# Patient Record
Sex: FEMALE | Race: BLACK OR AFRICAN AMERICAN | Employment: FULL TIME | URBAN - METROPOLITAN AREA
[De-identification: names, ages, dates, MRNs, and addresses within clinical notes are randomized per-mention and may not be internally consistent; named-entity substitution may affect disease eponyms.]

---

## 2017-01-06 ENCOUNTER — OFFICE VISIT (OUTPATIENT)
Dept: INTERNAL MEDICINE CLINIC | Age: 57
End: 2017-01-06

## 2017-01-06 ENCOUNTER — TELEPHONE (OUTPATIENT)
Dept: INTERNAL MEDICINE CLINIC | Age: 57
End: 2017-01-06

## 2017-01-06 VITALS
DIASTOLIC BLOOD PRESSURE: 78 MMHG | BODY MASS INDEX: 43.05 KG/M2 | HEART RATE: 91 BPM | WEIGHT: 243 LBS | HEIGHT: 63 IN | OXYGEN SATURATION: 95 % | SYSTOLIC BLOOD PRESSURE: 140 MMHG

## 2017-01-06 DIAGNOSIS — R07.1 CHEST PAIN ON BREATHING: Primary | ICD-10-CM

## 2017-01-06 DIAGNOSIS — R07.89 OTHER CHEST PAIN: Primary | ICD-10-CM

## 2017-01-06 PROCEDURE — 99213 OFFICE O/P EST LOW 20 MIN: CPT | Performed by: FAMILY MEDICINE

## 2017-01-06 PROCEDURE — 93000 ELECTROCARDIOGRAM COMPLETE: CPT | Performed by: FAMILY MEDICINE

## 2017-01-06 ASSESSMENT — ENCOUNTER SYMPTOMS: COUGH: 1

## 2017-01-12 ENCOUNTER — HOSPITAL ENCOUNTER (OUTPATIENT)
Dept: NON INVASIVE DIAGNOSTICS | Age: 57
Discharge: HOME | End: 2017-01-12
Attending: FAMILY MEDICINE | Admitting: FAMILY MEDICINE

## 2017-01-12 DIAGNOSIS — R07.89 OTHER CHEST PAIN: ICD-10-CM

## 2017-01-26 ENCOUNTER — TELEPHONE (OUTPATIENT)
Dept: INTERNAL MEDICINE CLINIC | Age: 57
End: 2017-01-26

## 2017-02-28 ENCOUNTER — TELEPHONE (OUTPATIENT)
Dept: INTERNAL MEDICINE CLINIC | Age: 57
End: 2017-02-28

## 2017-02-28 DIAGNOSIS — M54.50 CHRONIC LOW BACK PAIN WITHOUT SCIATICA, UNSPECIFIED BACK PAIN LATERALITY: Primary | ICD-10-CM

## 2017-02-28 DIAGNOSIS — G89.29 CHRONIC LOW BACK PAIN WITHOUT SCIATICA, UNSPECIFIED BACK PAIN LATERALITY: Primary | ICD-10-CM

## 2017-04-24 ENCOUNTER — TELEPHONE (OUTPATIENT)
Dept: INTERNAL MEDICINE CLINIC | Age: 57
End: 2017-04-24

## 2017-04-27 ENCOUNTER — OFFICE VISIT (OUTPATIENT)
Dept: ENDOCRINOLOGY | Age: 57
End: 2017-04-27

## 2017-04-27 VITALS
DIASTOLIC BLOOD PRESSURE: 78 MMHG | HEIGHT: 63 IN | WEIGHT: 244 LBS | HEART RATE: 72 BPM | OXYGEN SATURATION: 97 % | BODY MASS INDEX: 43.23 KG/M2 | RESPIRATION RATE: 14 BRPM | SYSTOLIC BLOOD PRESSURE: 135 MMHG

## 2017-04-27 DIAGNOSIS — E05.90 HYPERTHYROIDISM: ICD-10-CM

## 2017-04-27 DIAGNOSIS — E05.00 GRAVES' DISEASE: Primary | ICD-10-CM

## 2017-04-27 PROCEDURE — 99213 OFFICE O/P EST LOW 20 MIN: CPT | Performed by: INTERNAL MEDICINE

## 2017-04-28 DIAGNOSIS — E05.90 HYPERTHYROIDISM: ICD-10-CM

## 2017-04-28 DIAGNOSIS — E05.00 GRAVES' DISEASE: ICD-10-CM

## 2017-04-28 LAB
T4 FREE: 1.4 NG/DL (ref 0.9–1.8)
TSH SERPL DL<=0.05 MIU/L-ACNC: 1.81 UIU/ML (ref 0.27–4.2)

## 2017-04-30 LAB — TSH RECEPTOR AB: 3.03 IU/L

## 2017-05-08 ENCOUNTER — TELEPHONE (OUTPATIENT)
Dept: INTERNAL MEDICINE CLINIC | Age: 57
End: 2017-05-08

## 2017-05-08 DIAGNOSIS — E66.01 MORBID OBESITY DUE TO EXCESS CALORIES (HCC): ICD-10-CM

## 2017-05-10 ENCOUNTER — TELEPHONE (OUTPATIENT)
Dept: INTERNAL MEDICINE CLINIC | Age: 57
End: 2017-05-10

## 2017-05-12 LAB
CHOLESTEROL, TOTAL: 201 MG/DL (ref 0–199)
GLUCOSE BLD-MCNC: 87 MG/DL (ref 70–99)
HDLC SERPL-MCNC: 80 MG/DL (ref 40–60)
LDL CHOLESTEROL CALCULATED: 107 MG/DL
TRIGL SERPL-MCNC: 69 MG/DL (ref 0–150)

## 2017-05-16 ENCOUNTER — TELEPHONE (OUTPATIENT)
Dept: INTERNAL MEDICINE CLINIC | Age: 57
End: 2017-05-16

## 2017-05-18 ENCOUNTER — TELEPHONE (OUTPATIENT)
Dept: INTERNAL MEDICINE CLINIC | Age: 57
End: 2017-05-18

## 2017-05-23 ENCOUNTER — TELEPHONE (OUTPATIENT)
Dept: INTERNAL MEDICINE CLINIC | Age: 57
End: 2017-05-23

## 2017-05-23 DIAGNOSIS — E66.01 MORBID OBESITY DUE TO EXCESS CALORIES (HCC): ICD-10-CM

## 2017-06-12 ENCOUNTER — TELEPHONE (OUTPATIENT)
Dept: INTERNAL MEDICINE CLINIC | Age: 57
End: 2017-06-12

## 2017-06-13 ENCOUNTER — TELEPHONE (OUTPATIENT)
Dept: INTERNAL MEDICINE CLINIC | Age: 57
End: 2017-06-13

## 2017-06-20 ENCOUNTER — OFFICE VISIT (OUTPATIENT)
Dept: INTERNAL MEDICINE CLINIC | Age: 57
End: 2017-06-20

## 2017-06-20 VITALS
DIASTOLIC BLOOD PRESSURE: 70 MMHG | WEIGHT: 240 LBS | OXYGEN SATURATION: 96 % | TEMPERATURE: 97.8 F | HEART RATE: 72 BPM | SYSTOLIC BLOOD PRESSURE: 140 MMHG | RESPIRATION RATE: 16 BRPM | HEIGHT: 63 IN | BODY MASS INDEX: 42.52 KG/M2

## 2017-06-20 DIAGNOSIS — M51.36 DDD (DEGENERATIVE DISC DISEASE), LUMBAR: Primary | ICD-10-CM

## 2017-06-20 DIAGNOSIS — M54.5 ACUTE LOW BACK PAIN, UNSPECIFIED BACK PAIN LATERALITY, WITH SCIATICA PRESENCE UNSPECIFIED: ICD-10-CM

## 2017-06-20 DIAGNOSIS — J41.0 SIMPLE CHRONIC BRONCHITIS (HCC): ICD-10-CM

## 2017-06-20 DIAGNOSIS — I10 ESSENTIAL HYPERTENSION: ICD-10-CM

## 2017-06-20 LAB
CREATININE URINE POCT: ABNORMAL
MICROALBUMIN/CREAT 24H UR: ABNORMAL MG/G{CREAT}
MICROALBUMIN/CREAT UR-RTO: ABNORMAL

## 2017-06-20 PROCEDURE — 99214 OFFICE O/P EST MOD 30 MIN: CPT | Performed by: FAMILY MEDICINE

## 2017-06-20 PROCEDURE — 82044 UR ALBUMIN SEMIQUANTITATIVE: CPT | Performed by: FAMILY MEDICINE

## 2017-06-20 RX ORDER — TRAMADOL HYDROCHLORIDE 50 MG/1
50 TABLET ORAL EVERY 6 HOURS PRN
Qty: 120 TABLET | Refills: 2 | Status: SHIPPED | OUTPATIENT
Start: 2017-06-20 | End: 2017-07-20

## 2017-06-20 RX ORDER — LISINOPRIL AND HYDROCHLOROTHIAZIDE 12.5; 1 MG/1; MG/1
1 TABLET ORAL DAILY
Qty: 30 TABLET | Refills: 2 | Status: SHIPPED | OUTPATIENT
Start: 2017-06-20 | End: 2018-06-07

## 2017-06-20 RX ORDER — IBUPROFEN 800 MG/1
800 TABLET ORAL EVERY 8 HOURS PRN
Qty: 90 TABLET | Refills: 5 | Status: SHIPPED | OUTPATIENT
Start: 2017-06-20 | End: 2018-05-29 | Stop reason: SDUPTHER

## 2017-06-20 RX ORDER — IBUPROFEN 800 MG/1
800 TABLET ORAL EVERY 8 HOURS PRN
Qty: 90 TABLET | Refills: 5 | OUTPATIENT
Start: 2017-06-20

## 2017-06-20 RX ORDER — FLUTICASONE FUROATE AND VILANTEROL 100; 25 UG/1; UG/1
1 POWDER RESPIRATORY (INHALATION) DAILY
Qty: 1 EACH | Refills: 5 | Status: SHIPPED | OUTPATIENT
Start: 2017-06-20 | End: 2018-01-02 | Stop reason: SDUPTHER

## 2017-07-06 RX ORDER — MONTELUKAST SODIUM 10 MG/1
TABLET ORAL
Qty: 90 TABLET | Refills: 1 | Status: SHIPPED | OUTPATIENT
Start: 2017-07-06 | End: 2018-01-02 | Stop reason: SDUPTHER

## 2017-07-10 ENCOUNTER — TELEPHONE (OUTPATIENT)
Dept: INTERNAL MEDICINE CLINIC | Age: 57
End: 2017-07-10

## 2017-07-25 ENCOUNTER — TELEPHONE (OUTPATIENT)
Dept: INTERNAL MEDICINE CLINIC | Age: 57
End: 2017-07-25

## 2017-08-08 ENCOUNTER — TELEPHONE (OUTPATIENT)
Dept: INTERNAL MEDICINE CLINIC | Age: 57
End: 2017-08-08

## 2017-08-08 DIAGNOSIS — J41.0 SIMPLE CHRONIC BRONCHITIS (HCC): Primary | ICD-10-CM

## 2017-08-09 ENCOUNTER — TELEPHONE (OUTPATIENT)
Dept: PHARMACY | Facility: CLINIC | Age: 57
End: 2017-08-09

## 2017-08-14 DIAGNOSIS — E05.90 HYPERTHYROIDISM: Primary | ICD-10-CM

## 2017-08-22 DIAGNOSIS — E66.01 MORBID OBESITY, UNSPECIFIED OBESITY TYPE (HCC): ICD-10-CM

## 2017-08-31 ENCOUNTER — HOSPITAL ENCOUNTER (OUTPATIENT)
Dept: MAMMOGRAPHY | Age: 57
Discharge: HOME | End: 2017-08-31
Attending: FAMILY MEDICINE | Admitting: FAMILY MEDICINE

## 2017-08-31 DIAGNOSIS — Z12.31 VISIT FOR SCREENING MAMMOGRAM: ICD-10-CM

## 2017-09-07 ENCOUNTER — TELEPHONE (OUTPATIENT)
Dept: INTERNAL MEDICINE CLINIC | Age: 57
End: 2017-09-07

## 2017-09-15 ENCOUNTER — TELEPHONE (OUTPATIENT)
Dept: INTERNAL MEDICINE CLINIC | Age: 57
End: 2017-09-15

## 2017-09-18 DIAGNOSIS — E66.01 MORBID OBESITY, UNSPECIFIED OBESITY TYPE (HCC): ICD-10-CM

## 2017-10-03 ENCOUNTER — TELEPHONE (OUTPATIENT)
Dept: INTERNAL MEDICINE CLINIC | Age: 57
End: 2017-10-03

## 2017-10-13 ENCOUNTER — TELEPHONE (OUTPATIENT)
Dept: INTERNAL MEDICINE CLINIC | Age: 57
End: 2017-10-13

## 2017-10-31 ENCOUNTER — TELEPHONE (OUTPATIENT)
Dept: INTERNAL MEDICINE CLINIC | Age: 57
End: 2017-10-31

## 2017-11-09 ENCOUNTER — TELEPHONE (OUTPATIENT)
Dept: ENDOCRINOLOGY | Age: 57
End: 2017-11-09

## 2017-11-09 ENCOUNTER — TELEPHONE (OUTPATIENT)
Dept: INTERNAL MEDICINE CLINIC | Age: 57
End: 2017-11-09

## 2017-11-15 DIAGNOSIS — E05.90 HYPERTHYROIDISM: ICD-10-CM

## 2017-11-15 LAB
T4 FREE: 1.4 NG/DL (ref 0.9–1.8)
TSH SERPL DL<=0.05 MIU/L-ACNC: 0.13 UIU/ML (ref 0.27–4.2)

## 2017-12-05 ENCOUNTER — TELEPHONE (OUTPATIENT)
Dept: INTERNAL MEDICINE CLINIC | Age: 57
End: 2017-12-05

## 2017-12-06 ENCOUNTER — TELEPHONE (OUTPATIENT)
Dept: INTERNAL MEDICINE CLINIC | Age: 57
End: 2017-12-06

## 2017-12-18 ENCOUNTER — TELEPHONE (OUTPATIENT)
Dept: INTERNAL MEDICINE CLINIC | Age: 57
End: 2017-12-18

## 2017-12-28 ENCOUNTER — OFFICE VISIT (OUTPATIENT)
Dept: ENDOCRINOLOGY | Age: 57
End: 2017-12-28

## 2017-12-28 VITALS
BODY MASS INDEX: 41.57 KG/M2 | HEART RATE: 79 BPM | OXYGEN SATURATION: 98 % | RESPIRATION RATE: 16 BRPM | HEIGHT: 63 IN | DIASTOLIC BLOOD PRESSURE: 94 MMHG | SYSTOLIC BLOOD PRESSURE: 143 MMHG | WEIGHT: 234.6 LBS

## 2017-12-28 DIAGNOSIS — E05.90 HYPERTHYROIDISM: Primary | ICD-10-CM

## 2017-12-28 DIAGNOSIS — E05.00 GRAVES' DISEASE: ICD-10-CM

## 2017-12-28 PROCEDURE — 99213 OFFICE O/P EST LOW 20 MIN: CPT | Performed by: INTERNAL MEDICINE

## 2017-12-28 NOTE — PROGRESS NOTES
ophthalmology. Has mild exophthalmos. Cleared for MCKENZIE as per ophthalmology. Labs in 11/17 showed TSH 0.13 and Free T4 of 1.4    No treatment is recommended at this point given mild hyperthyroidism and no symptoms. Monitor labs every 6 months. 2. Obesity. Tried contrave. On saxenda as per PCP. 3. High B.P . Did not stat taking lisinopril given by PCP. Works at [employer]. Advised to have it checked and discuss with PCP if it stays high.

## 2018-01-02 ENCOUNTER — TELEPHONE (OUTPATIENT)
Dept: INTERNAL MEDICINE CLINIC | Age: 58
End: 2018-01-02

## 2018-01-02 DIAGNOSIS — J41.0 SIMPLE CHRONIC BRONCHITIS (HCC): ICD-10-CM

## 2018-01-02 DIAGNOSIS — E66.01 MORBID OBESITY, UNSPECIFIED OBESITY TYPE (HCC): ICD-10-CM

## 2018-01-02 RX ORDER — MONTELUKAST SODIUM 10 MG/1
TABLET ORAL
Qty: 90 TABLET | Refills: 3 | Status: SHIPPED | OUTPATIENT
Start: 2018-01-02 | End: 2018-04-04 | Stop reason: SDUPTHER

## 2018-01-02 RX ORDER — FLUTICASONE FUROATE AND VILANTEROL 100; 25 UG/1; UG/1
1 POWDER RESPIRATORY (INHALATION) DAILY
Qty: 1 EACH | Refills: 5 | Status: SHIPPED | OUTPATIENT
Start: 2018-01-02 | End: 2018-07-31 | Stop reason: SDUPTHER

## 2018-01-03 ENCOUNTER — TELEPHONE (OUTPATIENT)
Dept: INTERNAL MEDICINE CLINIC | Age: 58
End: 2018-01-03

## 2018-01-03 DIAGNOSIS — E05.00 GRAVES' DISEASE: ICD-10-CM

## 2018-01-03 DIAGNOSIS — H10.89 OTHER CONJUNCTIVITIS OF LEFT EYE: Primary | ICD-10-CM

## 2018-01-03 RX ORDER — SULFACETAMIDE SODIUM 100 MG/ML
2 SOLUTION/ DROPS OPHTHALMIC
Qty: 1 BOTTLE | Refills: 0 | Status: SHIPPED | OUTPATIENT
Start: 2018-01-03 | End: 2018-01-08

## 2018-01-24 ENCOUNTER — TELEPHONE (OUTPATIENT)
Dept: INTERNAL MEDICINE CLINIC | Age: 58
End: 2018-01-24

## 2018-01-24 DIAGNOSIS — E66.01 MORBID OBESITY, UNSPECIFIED OBESITY TYPE (HCC): ICD-10-CM

## 2018-02-23 DIAGNOSIS — E66.01 MORBID OBESITY, UNSPECIFIED OBESITY TYPE (HCC): ICD-10-CM

## 2018-04-04 ENCOUNTER — EMPLOYEE WELLNESS (OUTPATIENT)
Dept: OTHER | Age: 58
End: 2018-04-04

## 2018-04-04 LAB
CHOLESTEROL, TOTAL: 215 MG/DL (ref 0–199)
GLUCOSE BLD-MCNC: 79 MG/DL (ref 70–99)
HDLC SERPL-MCNC: 79 MG/DL (ref 40–60)
LDL CHOLESTEROL CALCULATED: 117 MG/DL
TRIGL SERPL-MCNC: 94 MG/DL (ref 0–150)

## 2018-04-05 RX ORDER — MONTELUKAST SODIUM 10 MG/1
TABLET ORAL
Qty: 90 TABLET | Refills: 3 | Status: SHIPPED | OUTPATIENT
Start: 2018-04-05 | End: 2018-04-09 | Stop reason: SDUPTHER

## 2018-04-09 RX ORDER — MONTELUKAST SODIUM 10 MG/1
TABLET ORAL
Qty: 90 TABLET | Refills: 3 | Status: CANCELLED | OUTPATIENT
Start: 2018-04-09

## 2018-04-10 VITALS — BODY MASS INDEX: 41.98 KG/M2 | WEIGHT: 237 LBS

## 2018-04-10 RX ORDER — MONTELUKAST SODIUM 10 MG/1
TABLET ORAL
Qty: 90 TABLET | Refills: 3 | Status: SHIPPED | OUTPATIENT
Start: 2018-04-10 | End: 2019-07-01 | Stop reason: SDUPTHER

## 2018-05-10 ENCOUNTER — TELEPHONE (OUTPATIENT)
Dept: INTERNAL MEDICINE CLINIC | Age: 58
End: 2018-05-10

## 2018-05-16 DIAGNOSIS — E66.01 MORBID OBESITY, UNSPECIFIED OBESITY TYPE (HCC): ICD-10-CM

## 2018-05-29 ENCOUNTER — OFFICE VISIT (OUTPATIENT)
Dept: INTERNAL MEDICINE CLINIC | Age: 58
End: 2018-05-29

## 2018-05-29 VITALS
BODY MASS INDEX: 41.81 KG/M2 | SYSTOLIC BLOOD PRESSURE: 126 MMHG | HEART RATE: 73 BPM | DIASTOLIC BLOOD PRESSURE: 84 MMHG | OXYGEN SATURATION: 98 % | WEIGHT: 236 LBS

## 2018-05-29 DIAGNOSIS — R79.89 LOW VITAMIN D LEVEL: ICD-10-CM

## 2018-05-29 DIAGNOSIS — Z00.00 HEALTHCARE MAINTENANCE: ICD-10-CM

## 2018-05-29 DIAGNOSIS — J45.40 MODERATE PERSISTENT ASTHMA WITHOUT COMPLICATION: Primary | ICD-10-CM

## 2018-05-29 DIAGNOSIS — M51.36 DDD (DEGENERATIVE DISC DISEASE), LUMBAR: ICD-10-CM

## 2018-05-29 DIAGNOSIS — E05.90 HYPERTHYROIDISM: ICD-10-CM

## 2018-05-29 LAB
HEPATITIS C ANTIBODY INTERPRETATION: NORMAL
T4 FREE: 1.7 NG/DL (ref 0.9–1.8)
TSH REFLEX: 0.04 UIU/ML (ref 0.27–4.2)
VITAMIN D 25-HYDROXY: 24.8 NG/ML

## 2018-05-29 PROCEDURE — 99214 OFFICE O/P EST MOD 30 MIN: CPT | Performed by: NURSE PRACTITIONER

## 2018-05-29 RX ORDER — LEVOCETIRIZINE DIHYDROCHLORIDE 5 MG/1
5 TABLET, FILM COATED ORAL NIGHTLY
Qty: 30 TABLET | Refills: 3 | Status: SHIPPED | OUTPATIENT
Start: 2018-05-29 | End: 2018-08-20

## 2018-05-29 RX ORDER — IBUPROFEN 800 MG/1
800 TABLET ORAL EVERY 8 HOURS PRN
Qty: 90 TABLET | Refills: 5 | Status: SHIPPED | OUTPATIENT
Start: 2018-05-29 | End: 2020-05-07 | Stop reason: SDUPTHER

## 2018-05-29 RX ORDER — LEVOCETIRIZINE DIHYDROCHLORIDE 5 MG/1
5 TABLET, FILM COATED ORAL NIGHTLY
Qty: 30 TABLET | Refills: 0 | Status: SHIPPED | OUTPATIENT
Start: 2018-05-29 | End: 2018-08-20

## 2018-05-29 ASSESSMENT — PATIENT HEALTH QUESTIONNAIRE - PHQ9
1. LITTLE INTEREST OR PLEASURE IN DOING THINGS: 0
SUM OF ALL RESPONSES TO PHQ QUESTIONS 1-9: 0
2. FEELING DOWN, DEPRESSED OR HOPELESS: 0
SUM OF ALL RESPONSES TO PHQ9 QUESTIONS 1 & 2: 0

## 2018-05-29 ASSESSMENT — ENCOUNTER SYMPTOMS
SHORTNESS OF BREATH: 1
COUGH: 1

## 2018-05-30 LAB — T3 TOTAL: 1.39 NG/ML (ref 0.8–2)

## 2018-06-07 ENCOUNTER — OFFICE VISIT (OUTPATIENT)
Dept: ENDOCRINOLOGY | Age: 58
End: 2018-06-07

## 2018-06-07 VITALS
BODY MASS INDEX: 42.35 KG/M2 | SYSTOLIC BLOOD PRESSURE: 157 MMHG | HEIGHT: 63 IN | WEIGHT: 239 LBS | OXYGEN SATURATION: 95 % | RESPIRATION RATE: 14 BRPM | HEART RATE: 74 BPM | DIASTOLIC BLOOD PRESSURE: 90 MMHG

## 2018-06-07 DIAGNOSIS — E05.90 HYPERTHYROIDISM: ICD-10-CM

## 2018-06-07 DIAGNOSIS — E05.00 GRAVES' DISEASE: Primary | ICD-10-CM

## 2018-06-07 PROCEDURE — 99214 OFFICE O/P EST MOD 30 MIN: CPT | Performed by: INTERNAL MEDICINE

## 2018-06-07 RX ORDER — METHIMAZOLE 5 MG/1
TABLET ORAL
Qty: 45 TABLET | Refills: 3 | Status: SHIPPED | OUTPATIENT
Start: 2018-06-07 | End: 2019-06-27 | Stop reason: SDUPTHER

## 2018-06-13 ENCOUNTER — TELEPHONE (OUTPATIENT)
Dept: INTERNAL MEDICINE CLINIC | Age: 58
End: 2018-06-13

## 2018-07-03 ENCOUNTER — TELEPHONE (OUTPATIENT)
Dept: RHEUMATOLOGY | Age: 58
End: 2018-07-03

## 2018-07-03 NOTE — TELEPHONE ENCOUNTER
PA submitted for Levocetirizine Dihydrochloride 5MG tablets on [facility]  DX: Allergic Rhinitis J30.1  (Key: [id number])       Pending Review

## 2018-07-20 ENCOUNTER — TELEPHONE (OUTPATIENT)
Dept: INTERNAL MEDICINE CLINIC | Age: 58
End: 2018-07-20

## 2018-07-31 ENCOUNTER — OFFICE VISIT (OUTPATIENT)
Dept: INTERNAL MEDICINE CLINIC | Age: 58
End: 2018-07-31

## 2018-07-31 VITALS
OXYGEN SATURATION: 98 % | SYSTOLIC BLOOD PRESSURE: 136 MMHG | WEIGHT: 235 LBS | BODY MASS INDEX: 41.63 KG/M2 | HEART RATE: 81 BPM | DIASTOLIC BLOOD PRESSURE: 70 MMHG

## 2018-07-31 DIAGNOSIS — E66.01 MORBID OBESITY, UNSPECIFIED OBESITY TYPE (HCC): ICD-10-CM

## 2018-07-31 DIAGNOSIS — J45.40 MODERATE PERSISTENT ASTHMA WITHOUT COMPLICATION: Primary | ICD-10-CM

## 2018-07-31 DIAGNOSIS — J30.89 ENVIRONMENTAL AND SEASONAL ALLERGIES: ICD-10-CM

## 2018-07-31 DIAGNOSIS — J41.0 SIMPLE CHRONIC BRONCHITIS (HCC): ICD-10-CM

## 2018-07-31 PROCEDURE — 99213 OFFICE O/P EST LOW 20 MIN: CPT | Performed by: NURSE PRACTITIONER

## 2018-07-31 RX ORDER — FLUTICASONE FUROATE AND VILANTEROL 100; 25 UG/1; UG/1
1 POWDER RESPIRATORY (INHALATION) DAILY
Qty: 1 EACH | Refills: 5 | Status: SHIPPED | OUTPATIENT
Start: 2018-07-31 | End: 2019-04-22 | Stop reason: SDUPTHER

## 2018-07-31 RX ORDER — ALBUTEROL SULFATE 90 UG/1
2 AEROSOL, METERED RESPIRATORY (INHALATION) EVERY 6 HOURS PRN
Qty: 1 INHALER | Refills: 3 | Status: SHIPPED | OUTPATIENT
Start: 2018-07-31 | End: 2018-07-31 | Stop reason: SDUPTHER

## 2018-07-31 RX ORDER — ALBUTEROL SULFATE 90 UG/1
2 AEROSOL, METERED RESPIRATORY (INHALATION) EVERY 6 HOURS PRN
Qty: 1 INHALER | Refills: 0 | Status: SHIPPED | OUTPATIENT
Start: 2018-07-31 | End: 2019-07-09 | Stop reason: SDUPTHER

## 2018-07-31 ASSESSMENT — ENCOUNTER SYMPTOMS
SHORTNESS OF BREATH: 1
COUGH: 1
CHEST TIGHTNESS: 1

## 2018-07-31 NOTE — PROGRESS NOTES
Subjective:      Patient ID: Oskar Shah is a 56 y.o. female. Presents today with chest tightness, SOB and congestion for one week. States she has not been taking her BREO daily and has not been using her rescue inhaler. She felt the rescue inhaler she was also needed when it with intense need        Review of Systems   HENT: Positive for postnasal drip. Respiratory: Positive for cough, chest tightness and shortness of breath. All other systems reviewed and are negative. Wt Readings from Last 3 Encounters:   07/31/18 235 lb (106.6 kg)   06/07/18 239 lb (108.4 kg)   05/29/18 236 lb (107 kg)     BP Readings from Last 3 Encounters:   07/31/18 (!) 140/80   06/07/18 (!) 157/90   05/29/18 126/84     Objective:   Physical Exam   Constitutional: She is oriented to person, place, and time. She appears well-developed and well-nourished. Neck: Normal range of motion and full passive range of motion without pain. Neck supple. Cardiovascular: Normal rate, regular rhythm and normal heart sounds. Pulmonary/Chest: Effort normal. She has decreased breath sounds in the right lower field and the left lower field. Peak flow 400-425 cc's   Neurological: She is alert and oriented to person, place, and time. Skin: Skin is warm and dry. Psychiatric: Her mood appears anxious.    Under emotional stress due to father had a recent stroke and she has been traveling back and forth to check on him in the hospital       Assessment:      Asthma: Peak flow between 275 and 400      Plan:      Use Albuterol inhaler with SOB or coughing   Take Breo every day, place by toothbrush at night    Allergies    Gargle with warm salt and water each night  Increase water intake to 6-8 glasses a day

## 2018-08-20 ENCOUNTER — OFFICE VISIT (OUTPATIENT)
Dept: INTERNAL MEDICINE CLINIC | Age: 58
End: 2018-08-20

## 2018-08-20 VITALS
SYSTOLIC BLOOD PRESSURE: 124 MMHG | DIASTOLIC BLOOD PRESSURE: 70 MMHG | BODY MASS INDEX: 39.95 KG/M2 | HEIGHT: 64 IN | WEIGHT: 234 LBS

## 2018-08-20 DIAGNOSIS — E66.01 MORBID OBESITY, UNSPECIFIED OBESITY TYPE (HCC): ICD-10-CM

## 2018-08-20 DIAGNOSIS — Z00.00 WELL ADULT EXAM: Primary | ICD-10-CM

## 2018-08-20 DIAGNOSIS — J45.20 MILD INTERMITTENT EXTRINSIC ASTHMA WITHOUT COMPLICATION: ICD-10-CM

## 2018-08-20 PROCEDURE — 99396 PREV VISIT EST AGE 40-64: CPT | Performed by: NURSE PRACTITIONER

## 2018-08-20 RX ORDER — FEXOFENADINE HCL 180 MG/1
180 TABLET, FILM COATED ORAL DAILY
Qty: 30 TABLET | Refills: 1 | Status: SHIPPED | OUTPATIENT
Start: 2018-08-20 | End: 2019-07-01 | Stop reason: SDUPTHER

## 2018-08-20 ASSESSMENT — ENCOUNTER SYMPTOMS
SHORTNESS OF BREATH: 1
COUGH: 1

## 2018-09-27 ENCOUNTER — TELEPHONE (OUTPATIENT)
Dept: INTERNAL MEDICINE CLINIC | Age: 58
End: 2018-09-27

## 2018-09-27 DIAGNOSIS — E66.01 MORBID OBESITY, UNSPECIFIED OBESITY TYPE (HCC): ICD-10-CM

## 2018-10-12 ENCOUNTER — TELEPHONE (OUTPATIENT)
Dept: INTERNAL MEDICINE CLINIC | Age: 58
End: 2018-10-12

## 2018-10-23 DIAGNOSIS — E05.00 GRAVES' DISEASE: ICD-10-CM

## 2018-10-23 DIAGNOSIS — E05.90 HYPERTHYROIDISM: ICD-10-CM

## 2018-10-23 LAB
T4 FREE: 1.7 NG/DL (ref 0.9–1.8)
TSH SERPL DL<=0.05 MIU/L-ACNC: <0.01 UIU/ML (ref 0.27–4.2)

## 2018-10-25 LAB — TSH RECEPTOR AB: 2.36 IU/L

## 2018-11-12 ENCOUNTER — OFFICE VISIT (OUTPATIENT)
Dept: INTERNAL MEDICINE CLINIC | Age: 58
End: 2018-11-12
Payer: COMMERCIAL

## 2018-11-12 VITALS — BODY MASS INDEX: 38.58 KG/M2 | WEIGHT: 223 LBS | DIASTOLIC BLOOD PRESSURE: 60 MMHG | SYSTOLIC BLOOD PRESSURE: 130 MMHG

## 2018-11-12 DIAGNOSIS — R19.7 DIARRHEA, UNSPECIFIED TYPE: Primary | ICD-10-CM

## 2018-11-12 DIAGNOSIS — E66.01 MORBID OBESITY, UNSPECIFIED OBESITY TYPE (HCC): ICD-10-CM

## 2018-11-12 PROCEDURE — 99213 OFFICE O/P EST LOW 20 MIN: CPT | Performed by: NURSE PRACTITIONER

## 2018-11-12 RX ORDER — CIPROFLOXACIN 250 MG/1
250 TABLET, FILM COATED ORAL DAILY
Qty: 3 TABLET | Refills: 0 | Status: SHIPPED | OUTPATIENT
Start: 2018-11-12 | End: 2018-11-15

## 2018-11-12 RX ORDER — SULFAMETHOXAZOLE AND TRIMETHOPRIM 800; 160 MG/1; MG/1
1 TABLET ORAL 2 TIMES DAILY
Qty: 10 TABLET | Refills: 0 | Status: SHIPPED | OUTPATIENT
Start: 2018-11-12 | End: 2018-11-17

## 2018-11-12 RX ORDER — CIPROFLOXACIN 500 MG/1
500 TABLET, FILM COATED ORAL 2 TIMES DAILY
Qty: 3 TABLET | Refills: 0 | Status: SHIPPED | OUTPATIENT
Start: 2018-11-12 | End: 2018-11-15

## 2018-11-12 ASSESSMENT — ENCOUNTER SYMPTOMS
DIARRHEA: 1
ABDOMINAL PAIN: 0

## 2019-02-06 RX ORDER — LEVOCETIRIZINE DIHYDROCHLORIDE 5 MG/1
5 TABLET, FILM COATED ORAL NIGHTLY
Qty: 30 TABLET | Refills: 3 | Status: SHIPPED | OUTPATIENT
Start: 2019-02-06 | End: 2019-07-02 | Stop reason: SDUPTHER

## 2019-04-10 ENCOUNTER — TELEPHONE (OUTPATIENT)
Dept: INTERNAL MEDICINE CLINIC | Age: 59
End: 2019-04-10

## 2019-04-22 ENCOUNTER — OFFICE VISIT (OUTPATIENT)
Dept: INTERNAL MEDICINE CLINIC | Age: 59
End: 2019-04-22
Payer: COMMERCIAL

## 2019-04-22 VITALS — WEIGHT: 227 LBS | DIASTOLIC BLOOD PRESSURE: 80 MMHG | BODY MASS INDEX: 39.27 KG/M2 | SYSTOLIC BLOOD PRESSURE: 148 MMHG

## 2019-04-22 DIAGNOSIS — J41.0 SIMPLE CHRONIC BRONCHITIS (HCC): ICD-10-CM

## 2019-04-22 DIAGNOSIS — J45.31 MILD PERSISTENT ASTHMA WITH ACUTE EXACERBATION: ICD-10-CM

## 2019-04-22 DIAGNOSIS — J45.40 MODERATE PERSISTENT ASTHMA WITHOUT COMPLICATION: Primary | ICD-10-CM

## 2019-04-22 PROCEDURE — 99214 OFFICE O/P EST MOD 30 MIN: CPT | Performed by: NURSE PRACTITIONER

## 2019-04-22 RX ORDER — ALBUTEROL SULFATE 2.5 MG/3ML
2.5 SOLUTION RESPIRATORY (INHALATION) EVERY 6 HOURS PRN
Qty: 120 EACH | Status: CANCELLED | OUTPATIENT
Start: 2019-04-22

## 2019-04-22 RX ORDER — FLUTICASONE FUROATE AND VILANTEROL 100; 25 UG/1; UG/1
1 POWDER RESPIRATORY (INHALATION) DAILY
Qty: 1 EACH | Refills: 5 | Status: SHIPPED | OUTPATIENT
Start: 2019-04-22 | End: 2019-09-19 | Stop reason: ALTCHOICE

## 2019-04-22 RX ORDER — ALBUTEROL SULFATE 2.5 MG/3ML
2.5 SOLUTION RESPIRATORY (INHALATION) EVERY 6 HOURS PRN
Qty: 120 EACH | Refills: 1 | Status: SHIPPED | OUTPATIENT
Start: 2019-04-22 | End: 2019-07-10

## 2019-04-22 ASSESSMENT — PATIENT HEALTH QUESTIONNAIRE - PHQ9
1. LITTLE INTEREST OR PLEASURE IN DOING THINGS: 0
SUM OF ALL RESPONSES TO PHQ9 QUESTIONS 1 & 2: 0
SUM OF ALL RESPONSES TO PHQ QUESTIONS 1-9: 0
SUM OF ALL RESPONSES TO PHQ QUESTIONS 1-9: 0
2. FEELING DOWN, DEPRESSED OR HOPELESS: 0

## 2019-04-22 ASSESSMENT — ENCOUNTER SYMPTOMS
COUGH: 1
CHEST TIGHTNESS: 1

## 2019-04-22 NOTE — PROGRESS NOTES
albuteroSubjective:      Patient ID: Terri Mcneil is a 66 y.o. female. Asthma   She complains of chest tightness and cough. This is a chronic problem. The current episode started more than 1 year ago. The problem has been waxing and waning. The cough is non-productive. Associated symptoms include chest pain and postnasal drip. Her symptoms are aggravated by nothing. Her symptoms are not alleviated by OTC cough suppressant. Her past medical history is significant for asthma. Review of Systems   Constitutional: Negative. HENT: Positive for postnasal drip. Respiratory: Positive for cough. Cardiovascular: Positive for chest pain. Endocrine: Negative. Genitourinary: Negative. Musculoskeletal: Negative. Vitals:    04/22/19 0848   BP: (!) 148/80     BP Readings from Last 3 Encounters:   04/22/19 (!) 148/80   11/12/18 130/60   08/20/18 124/70     Wt Readings from Last 3 Encounters:   04/22/19 227 lb (103 kg)   11/12/18 223 lb (101.2 kg)   08/20/18 234 lb (106.1 kg)     Past Medical History:   Diagnosis Date    Chronic back pain     COPD (chronic obstructive pulmonary disease) (HCC)     confirmed with PFT     HTN, goal below 140/90     Hyperthyroidism     Grave's dz    Red eyes     Restrictive lung disease     secondary to weight    Surgical menopause     LMP at age 39     Objective:   Physical Exam   Constitutional: She appears well-developed and well-nourished. HENT:   Head: Normocephalic. Right Ear: Hearing, tympanic membrane and external ear normal.   Left Ear: Hearing, tympanic membrane and external ear normal.   Nose: Nose normal. Right sinus exhibits no maxillary sinus tenderness and no frontal sinus tenderness. Left sinus exhibits no maxillary sinus tenderness and no frontal sinus tenderness. Mouth/Throat: Uvula is midline and mucous membranes are normal. Oropharyngeal exudate present.        Bilateral canal redness noted   Cardiovascular: Normal rate, regular rhythm and normal heart sounds. Pulmonary/Chest: Effort normal. She has decreased breath sounds in the right lower field and the left lower field. Lymphadenopathy:        Head (right side): No submental, no submandibular, no tonsillar, no preauricular, no posterior auricular and no occipital adenopathy present. Head (left side): No submental, no submandibular, no tonsillar, no preauricular, no posterior auricular and no occipital adenopathy present. She has no cervical adenopathy.        Assessment:      Asthma: 50% of visit spent on counselling      Plan:      Take fexofenadine at bedtime  Gargle with warm salt and water every night  Increase water intake  Do Breo once every day        Yanelis Clement Ip, APRN - CNP

## 2019-06-26 DIAGNOSIS — J45.40 MODERATE PERSISTENT ASTHMA WITHOUT COMPLICATION: ICD-10-CM

## 2019-06-26 DIAGNOSIS — J45.20 MILD INTERMITTENT EXTRINSIC ASTHMA WITHOUT COMPLICATION: ICD-10-CM

## 2019-06-26 RX ORDER — FEXOFENADINE HCL 180 MG/1
180 TABLET, FILM COATED ORAL DAILY
Qty: 30 TABLET | Refills: 1 | Status: CANCELLED | OUTPATIENT
Start: 2019-06-26

## 2019-06-26 RX ORDER — MONTELUKAST SODIUM 10 MG/1
TABLET ORAL
Qty: 90 TABLET | Refills: 3 | Status: CANCELLED | OUTPATIENT
Start: 2019-06-26

## 2019-06-27 DIAGNOSIS — J45.20 MILD INTERMITTENT EXTRINSIC ASTHMA WITHOUT COMPLICATION: ICD-10-CM

## 2019-06-27 DIAGNOSIS — E05.90 HYPERTHYROIDISM: Primary | ICD-10-CM

## 2019-06-27 RX ORDER — METHIMAZOLE 5 MG/1
TABLET ORAL
Qty: 45 TABLET | Refills: 3 | Status: SHIPPED | OUTPATIENT
Start: 2019-06-27 | End: 2020-03-19 | Stop reason: SDUPTHER

## 2019-07-01 RX ORDER — MONTELUKAST SODIUM 10 MG/1
TABLET ORAL
Qty: 90 TABLET | Refills: 3 | Status: SHIPPED | OUTPATIENT
Start: 2019-07-01 | End: 2019-07-09 | Stop reason: SDUPTHER

## 2019-07-01 RX ORDER — FEXOFENADINE HCL 180 MG/1
180 TABLET, FILM COATED ORAL DAILY
Qty: 30 TABLET | Refills: 1 | Status: SHIPPED | OUTPATIENT
Start: 2019-07-01 | End: 2019-08-08

## 2019-07-03 DIAGNOSIS — J45.40 MODERATE PERSISTENT ASTHMA WITHOUT COMPLICATION: ICD-10-CM

## 2019-07-09 RX ORDER — MONTELUKAST SODIUM 10 MG/1
TABLET ORAL
Qty: 90 TABLET | Refills: 3 | Status: SHIPPED | OUTPATIENT
Start: 2019-07-09 | End: 2020-12-09 | Stop reason: SDUPTHER

## 2019-07-09 RX ORDER — ALBUTEROL SULFATE 90 UG/1
2 AEROSOL, METERED RESPIRATORY (INHALATION) EVERY 6 HOURS PRN
Qty: 1 INHALER | Refills: 0 | Status: SHIPPED | OUTPATIENT
Start: 2019-07-09 | End: 2019-07-10

## 2019-07-09 RX ORDER — LEVOCETIRIZINE DIHYDROCHLORIDE 5 MG/1
5 TABLET, FILM COATED ORAL NIGHTLY
Qty: 30 TABLET | Refills: 3 | Status: SHIPPED | OUTPATIENT
Start: 2019-07-09 | End: 2019-07-10

## 2019-07-10 ENCOUNTER — OFFICE VISIT (OUTPATIENT)
Dept: ENT CLINIC | Age: 59
End: 2019-07-10
Payer: COMMERCIAL

## 2019-07-10 VITALS
HEART RATE: 77 BPM | BODY MASS INDEX: 38.98 KG/M2 | HEIGHT: 63 IN | DIASTOLIC BLOOD PRESSURE: 90 MMHG | SYSTOLIC BLOOD PRESSURE: 150 MMHG | WEIGHT: 220 LBS

## 2019-07-10 DIAGNOSIS — E05.00 GRAVES' DISEASE: ICD-10-CM

## 2019-07-10 DIAGNOSIS — G47.30 SLEEP APNEA IN ADULT: ICD-10-CM

## 2019-07-10 DIAGNOSIS — R09.82 PND (POST-NASAL DRIP): ICD-10-CM

## 2019-07-10 DIAGNOSIS — R09.89 PHLEGM IN THROAT: ICD-10-CM

## 2019-07-10 DIAGNOSIS — J30.1 SEASONAL ALLERGIC RHINITIS DUE TO POLLEN: Primary | ICD-10-CM

## 2019-07-10 PROCEDURE — 99203 OFFICE O/P NEW LOW 30 MIN: CPT | Performed by: OTOLARYNGOLOGY

## 2019-07-10 RX ORDER — FLUTICASONE PROPIONATE 50 MCG
SPRAY, SUSPENSION (ML) NASAL
Qty: 1 BOTTLE | Refills: 2 | Status: SHIPPED | OUTPATIENT
Start: 2019-07-10 | End: 2020-09-09 | Stop reason: SDUPTHER

## 2019-07-10 NOTE — PROGRESS NOTES
right arthroscopy           EXAMINATION:    VITALS SIGNS reviewed. Vitals:    07/10/19 1424   BP: (!) 150/90   Pulse: 77   Weight: 220 lb (99.8 kg)   Height: 5' 3\" (1.6 m)     GENERAL APPEARANCE: WDWN NAD, A&Ox3. ABILITY TO COMMUNICATE/QUALITY OF VOICE:  Normal, no hoarseness or hot potato quality.  (+) EYES:  Graves ophthalmopathy appearance, mild, left > right. Extraocular motion: intact, normal primary gaze alignment. SALIVARY GLANDS:  Parotid gland and submandibular gland normal bilaterally. INSPECTION, HEAD AND FACE:  Normal with no masses, lesions, tenderness, or deformities. FACIAL STRENGTH, MOTION:  Facial nerve function intact and equal bilaterally for all 5 branches. SINUSES:  Frontal sinuses and maxillary sinuses nontender, bilaterally. HEARING ASSESSMENT:  Hearing was intact to finger rub at the external meatus bilaterally. Tuning fork tests, using a 512 Hz tuning fork, showed the Hager test midline and Rinne test air conduction greater and bone conduction bilaterally. EXTERNAL EAR/NOSE:  The pinnae, mastoids, and external nose were normal bilaterally. EARS, OTOSCOPY:  The tympanic membranes and external auditory canals were normal bilaterally. NOSE:  The nasal septum was midline. The inferior turbinates were normal bilaterally. Nasal mucosa and nasal secretions were normal bilaterally. LIPS, TEETH AND GUMS:  Normal.  (+) OROPHARYNX/ORAL CAVITY:  Uvula elongated with redundant OP mucosa Tonsils normal.  Normal mucosa with no ulcerations, masses, lesions, erythema, exudate, or structural abnormalities. NECK:  Normal with no masses, tenderness, or tracheal deviation, and with normal laryngeal crepitus.  (+) THYROID:  Bilateral goiter. Normal, with no nodules, goiter, or tenderness bilaterally. LYMPH NODES, CERVICAL, FACIAL AND SUPRACLAVICULAR:  No lymphadenopathy detected.         REVIEW OF IMAGING:       Narrative   INDICATION: Hyperthyroidism.       COMPARISON: None.     FINDINGS:       Sonography of the thyroid gland was performed. The entire thyroid   gland is enlarged and heterogeneous in echo texture. The right   lobe measures 5.6 x 2.1 x 3.1 cm. The left lobe measures 5.8 x 2.2   x 2.8 cm. There is no discrete right thyroid nodules identified.       Within the left thyroid lobe, scattered cysts are noted with no   discrete nodule.           Impression   IMPRESSION:        Diffusely enlarged and heterogeneous thyroid gland. Scattered   cysts are identified with no discrete nodules.             Narrative   NUCLEAR MEDICINE, THYROID UPTAKE SCAN.    JUL 02, 2014 08:31:52   AM:       INDICATION:  \"Hyperthyroidism. \"                                               COMPARISON: Ultrasound thyroid, 6/25/2014.       FINDINGS:       24 hours following the administration of the Iodine-123 capsule,   the uptake is calculated as 69.9%.       The uptake pattern is symmetrical. The thyroid gland is globally   enlarged, consistent with the ultrasound.           Impression   IMPRESSION:       24-hour thyroid uptake of 69.9% (normal range 10 to 35%). This is   consistent with Graves disease.       Thyromegaly, also consistent with Graves disease. IMPRESSION / DIAGNOSES / ORDERS:       Stephan Balderrama was seen today for allergic rhinitis  and thyroid problem. Diagnoses and all orders for this visit:    Seasonal allergic rhinitis due to pollen  -     fluticasone (FLONASE) 50 MCG/ACT nasal spray; 2 sprays in each nostril daily. PND (post-nasal drip)  -     fluticasone (FLONASE) 50 MCG/ACT nasal spray; 2 sprays in each nostril daily. Phlegm in throat    Sleep apnea in adult  -     [facility] - Adriana Mosley MD, Sleep Medicine, [facility]' disease        RECOMMENDATIONS/PLAN:    See patient instructions on file for this visit, which were fully discussed with the patient.       Follow-up  Return if symptoms worsen or fail to improve with treatment, or, for any further Ear, Nose,

## 2019-07-10 NOTE — PATIENT INSTRUCTIONS
ulcers inside your nose;  · injury of or surgery on your nose;  · tuberculosis or any other infection or illness;  · glaucoma or cataracts;  · liver disease;  · osteoporosis; or  · herpes simplex virus of your eyes. If you use fluticasone nasal without a prescription and you have any medical conditions, ask a doctor or pharmacist if this medicine is safe for you. Also tell your doctor if you have diabetes. Steroid medicines may increase the glucose (sugar) levels in your blood or urine. You may also need to adjust the dose of your diabetes medications. It is not known whether fluticasone nasal will harm an unborn baby. Ask a doctor before using this medicine if you are pregnant. It is not known whether fluticasone nasal passes into breast milk or if it could affect a nursing baby. Ask a doctor before using this medicine if you are breast-feeding. How should I use fluticasone nasal?  Use exactly as directed on the label, or as prescribed by your doctor. Do not use in larger or smaller amounts or for longer than recommended. Do not share this medicine with another person, even if they have the same symptoms you have. The usual dose of fluticasone nasal is 1 to 2 sprays into each nostril once or twice per day. Your dose will depend on the fluticasone brand or strength you use, and your dose may change once your symptoms improve. Follow all dosing instructions very carefully. Thania Almonte is not approved for use by anyone younger than 25years old. Do not use Flonase in a child younger than 3years old. Do not use Veramyst in a child younger than 3years old. Any child using fluticasone nasal should be supervised by an adult while using the nasal spray. This medicine comes with patient instructions for safe and effective use, and directions for priming the nasal spray device. Follow these directions carefully. Ask your doctor or pharmacist if you have any questions. Shake the nasal spray just before each use.   If you switched to fluticasone from another steroid medicine, do not stop using the other steroid suddenly or you may have unpleasant withdrawal symptoms. Talk with your doctor about tapering your steroid dose before stopping completely. To be sure fluticasone nasal is not causing harmful effects on your nose or sinuses, your doctor may need to check your progress on a regular basis. It may take up to several days before your symptoms improve. Keep using the medication as directed and tell your doctor if your symptoms do not improve after a week of treatment. Store fluticasone nasal in an upright position at room temperature, away from moisture and heat. Throw the spray bottle away after you have used 120 sprays, even if there is still medicine left in the bottle. What happens if I miss a dose? Use the missed dose as soon as you remember. Skip the missed dose if it is almost time for your next scheduled dose. Do not use extra medicine to make up the missed dose. What happens if I overdose? Seek emergency medical attention or call the Poison Help line at 1-362.645.2425. An overdose of fluticasone nasal is not expected to produce life threatening symptoms. However, long term use of high steroid doses can lead to symptoms such as thinning skin, easy bruising, changes in the shape or location of body fat (especially in your face, neck, back, and waist), increased acne or facial hair, menstrual problems, impotence, or loss of interest in sex. What should I avoid while using fluticasone nasal?  Avoid getting the spray in your eyes or mouth. If this does happen, rinse with water. Avoid being near people who are sick or have infections. Call your doctor for preventive treatment if you are exposed to chickenpox or measles.  These conditions can be serious or even fatal in people who are using fluticasone nasal.  What are the possible side effects of fluticasone nasal?  Get emergency medical help if you have signs of that the information provided by Denise Dominguez Dr is accurate, up-to-date, and complete, but no guarantee is made to that effect. Drug information contained herein may be time sensitive. [facility] information has been compiled for use by healthcare practitioners and consumers in the [place] and therefore [facility] does not warrant that uses outside of the [place] are appropriate, unless specifically indicated otherwise. [facility]'s drug information does not endorse drugs, diagnose patients or recommend therapy. [facility]'s drug information is an informational resource designed to assist licensed healthcare practitioners in caring for their patients and/or to serve consumers viewing this service as a supplement to, and not a substitute for, the expertise, skill, knowledge and judgment of healthcare practitioners. The absence of a warning for a given drug or drug combination in no way should be construed to indicate that the drug or drug combination is safe, effective or appropriate for any given patient. [facility] does not assume any responsibility for any aspect of healthcare administered with the aid of information [facility] provides. The information contained herein is not intended to cover all possible uses, directions, precautions, warnings, drug interactions, allergic reactions, or adverse effects. If you have questions about the drugs you are taking, check with your doctor, nurse or pharmacist.  Copyright 3569-9590 [address]. Version: 9.01. Revision date: 11/2/2017. Care instructions adapted under license by [facility] ([facility]). If you have questions about a medical condition or this instruction, always ask your healthcare professional. [place] [address] any warranty or liability for your use of this information.

## 2019-07-11 ENCOUNTER — OFFICE VISIT (OUTPATIENT)
Dept: INTERNAL MEDICINE CLINIC | Age: 59
End: 2019-07-11
Payer: COMMERCIAL

## 2019-07-11 VITALS
HEART RATE: 74 BPM | SYSTOLIC BLOOD PRESSURE: 150 MMHG | TEMPERATURE: 98.3 F | BODY MASS INDEX: 38.76 KG/M2 | HEIGHT: 64 IN | DIASTOLIC BLOOD PRESSURE: 70 MMHG | OXYGEN SATURATION: 83 % | WEIGHT: 227 LBS

## 2019-07-11 DIAGNOSIS — I10 ESSENTIAL HYPERTENSION: ICD-10-CM

## 2019-07-11 DIAGNOSIS — Z00.00 WELL ADULT EXAM: Primary | ICD-10-CM

## 2019-07-11 DIAGNOSIS — J45.30 MILD PERSISTENT ASTHMA, UNSPECIFIED WHETHER COMPLICATED: ICD-10-CM

## 2019-07-11 PROCEDURE — 99396 PREV VISIT EST AGE 40-64: CPT | Performed by: NURSE PRACTITIONER

## 2019-07-11 ASSESSMENT — ENCOUNTER SYMPTOMS
RESPIRATORY NEGATIVE: 1
GASTROINTESTINAL NEGATIVE: 1
ALLERGIC/IMMUNOLOGIC NEGATIVE: 1
EYES NEGATIVE: 1

## 2019-07-11 NOTE — PROGRESS NOTES
Subjective:      Patient ID: Rakan Denton is a 66 y.o. female. Presents today for well exam      Review of Systems   Constitutional: Negative. HENT: Positive for congestion. Eyes: Negative. Respiratory: Negative. Cardiovascular: Negative. Gastrointestinal: Negative. Endocrine: Negative. Genitourinary: Negative. Allergic/Immunologic: Negative. Vitals:    07/11/19 1503   BP: (!) 150/70   Pulse: 74   Temp: 98.3 °F (36.8 °C)   SpO2: (!) 83%     Wt Readings from Last 3 Encounters:   07/11/19 227 lb (103 kg)   07/10/19 220 lb (99.8 kg)   04/22/19 227 lb (103 kg)     BP Readings from Last 3 Encounters:   07/11/19 (!) 150/70   07/10/19 (!) 150/90   04/22/19 (!) 148/80     Past Medical History:   Diagnosis Date    Chronic back pain     COPD (chronic obstructive pulmonary disease) ([facility] [address]     confirmed with PFT     HTN, goal below 140/90     Hyperthyroidism     Grave's dz    Red eyes     Restrictive lung disease     secondary to weight    Surgical menopause     LMP at age 39     Family History   Problem Relation Age of Onset    Cancer Mother         unknown    Liver Disease Mother     Kidney Disease Mother     Diabetes Father     Lupus Sister      Objective:   Physical Exam   Constitutional: She is oriented to person, place, and time. She appears well-developed and well-nourished. HENT:   Head: Normocephalic. Right Ear: Hearing, tympanic membrane, external ear and ear canal normal.   Left Ear: Hearing, tympanic membrane, external ear and ear canal normal.   Nose: Nose normal. Right sinus exhibits no maxillary sinus tenderness and no frontal sinus tenderness. Left sinus exhibits no maxillary sinus tenderness and no frontal sinus tenderness. Mouth/Throat: Uvula is midline and mucous membranes are normal. Oropharyngeal exudate present. Eyes: Pupils are equal, round, and reactive to light.  Conjunctivae, EOM and lids are normal. Lids are everted and swept, no foreign bodies found. Right eye exhibits no chemosis, no discharge, no exudate and no hordeolum. No foreign body present in the right eye. Left eye exhibits no chemosis, no discharge, no exudate and no hordeolum. No foreign body present in the left eye. No scleral icterus. Neck: Normal range of motion and full passive range of motion without pain. No thyroid mass present. Cardiovascular: Normal rate, regular rhythm and normal heart sounds. Pulmonary/Chest: Effort normal and breath sounds normal.   Abdominal: Soft. Normal appearance and bowel sounds are normal. There is no hepatosplenomegaly, splenomegaly or hepatomegaly. There is no tenderness. No hernia. Hernia confirmed negative in the right inguinal area and confirmed negative in the left inguinal area. Musculoskeletal: Normal range of motion. Lymphadenopathy:        Head (right side): No submental, no submandibular, no tonsillar, no preauricular, no posterior auricular and no occipital adenopathy present. Head (left side): No submental, no submandibular, no tonsillar, no preauricular, no posterior auricular and no occipital adenopathy present. She has no cervical adenopathy. Right cervical: No superficial cervical, no deep cervical and no posterior cervical adenopathy present. Left cervical: No superficial cervical, no deep cervical and no posterior cervical adenopathy present. Neurological: She is alert and oriented to person, place, and time. She has normal strength and normal reflexes. No cranial nerve deficit or sensory deficit. GCS eye subscore is 4. GCS verbal subscore is 5. GCS motor subscore is 6. Reflex Scores:       Tricep reflexes are 2+ on the right side and 2+ on the left side. Bicep reflexes are 2+ on the right side and 2+ on the left side. Brachioradialis reflexes are 2+ on the right side and 2+ on the left side. Patellar reflexes are 2+ on the right side and 2+ on the left side.        Achilles reflexes are

## 2019-07-12 DIAGNOSIS — Z00.00 WELL ADULT EXAM: ICD-10-CM

## 2019-07-12 LAB
ALBUMIN SERPL-MCNC: 4.6 G/DL (ref 3.4–5)
ANION GAP SERPL CALCULATED.3IONS-SCNC: 14 MMOL/L (ref 3–16)
BUN BLDV-MCNC: 15 MG/DL (ref 7–20)
CALCIUM SERPL-MCNC: 9.3 MG/DL (ref 8.3–10.6)
CHLORIDE BLD-SCNC: 100 MMOL/L (ref 99–110)
CHOLESTEROL, FASTING: 203 MG/DL (ref 0–199)
CO2: 26 MMOL/L (ref 21–32)
CREAT SERPL-MCNC: 1 MG/DL (ref 0.6–1.1)
GFR AFRICAN AMERICAN: >60
GFR NON-AFRICAN AMERICAN: 57
GLUCOSE BLD-MCNC: 102 MG/DL (ref 70–99)
HDLC SERPL-MCNC: 85 MG/DL (ref 40–60)
LDL CHOLESTEROL CALCULATED: 102 MG/DL
PHOSPHORUS: 3.3 MG/DL (ref 2.5–4.9)
POTASSIUM SERPL-SCNC: 4.5 MMOL/L (ref 3.5–5.1)
SODIUM BLD-SCNC: 140 MMOL/L (ref 136–145)
TRIGLYCERIDE, FASTING: 79 MG/DL (ref 0–150)
VLDLC SERPL CALC-MCNC: 16 MG/DL

## 2019-08-26 ENCOUNTER — OFFICE VISIT (OUTPATIENT)
Dept: PULMONOLOGY | Age: 59
End: 2019-08-26
Payer: COMMERCIAL

## 2019-08-26 VITALS
HEART RATE: 67 BPM | BODY MASS INDEX: 39.34 KG/M2 | HEIGHT: 63 IN | DIASTOLIC BLOOD PRESSURE: 72 MMHG | WEIGHT: 222 LBS | OXYGEN SATURATION: 99 % | SYSTOLIC BLOOD PRESSURE: 122 MMHG

## 2019-08-26 DIAGNOSIS — G47.33 OBSTRUCTIVE SLEEP APNEA SYNDROME: Primary | ICD-10-CM

## 2019-08-26 DIAGNOSIS — E05.00 GRAVES' DISEASE: Chronic | ICD-10-CM

## 2019-08-26 DIAGNOSIS — J30.1 SEASONAL ALLERGIC RHINITIS DUE TO POLLEN: Chronic | ICD-10-CM

## 2019-08-26 DIAGNOSIS — J44.9 CHRONIC OBSTRUCTIVE PULMONARY DISEASE, UNSPECIFIED COPD TYPE (HCC): ICD-10-CM

## 2019-08-26 DIAGNOSIS — E66.9 NON MORBID OBESITY, UNSPECIFIED OBESITY TYPE: Chronic | ICD-10-CM

## 2019-08-26 PROCEDURE — 99244 OFF/OP CNSLTJ NEW/EST MOD 40: CPT | Performed by: INTERNAL MEDICINE

## 2019-08-26 ASSESSMENT — SLEEP AND FATIGUE QUESTIONNAIRES
HOW LIKELY ARE YOU TO NOD OFF OR FALL ASLEEP WHILE SITTING AND TALKING TO SOMEONE: 0
HOW LIKELY ARE YOU TO NOD OFF OR FALL ASLEEP WHILE WATCHING TV: 1
HOW LIKELY ARE YOU TO NOD OFF OR FALL ASLEEP WHILE LYING DOWN TO REST IN THE AFTERNOON WHEN CIRCUMSTANCES PERMIT: 1
HOW LIKELY ARE YOU TO NOD OFF OR FALL ASLEEP WHEN YOU ARE A PASSENGER IN A CAR FOR AN HOUR WITHOUT A BREAK: 1
HOW LIKELY ARE YOU TO NOD OFF OR FALL ASLEEP WHILE SITTING QUIETLY AFTER LUNCH WITHOUT ALCOHOL: 0
ESS TOTAL SCORE: 3
NECK CIRCUMFERENCE (INCHES): 17.5
HOW LIKELY ARE YOU TO NOD OFF OR FALL ASLEEP IN A CAR, WHILE STOPPED FOR A FEW MINUTES IN TRAFFIC: 0
HOW LIKELY ARE YOU TO NOD OFF OR FALL ASLEEP WHILE SITTING AND READING: 0
HOW LIKELY ARE YOU TO NOD OFF OR FALL ASLEEP WHILE SITTING INACTIVE IN A PUBLIC PLACE: 0

## 2019-08-26 ASSESSMENT — ENCOUNTER SYMPTOMS
ABDOMINAL DISTENTION: 0
ABDOMINAL PAIN: 0
NAUSEA: 0
RHINORRHEA: 0
EYE PAIN: 0
PHOTOPHOBIA: 0
CHOKING: 0
VOMITING: 0
CHEST TIGHTNESS: 0
SHORTNESS OF BREATH: 0
ALLERGIC/IMMUNOLOGIC NEGATIVE: 1
APNEA: 1

## 2019-08-26 NOTE — LETTER
look forward to following [place] along with you.     Sincerely,      Dejah Vences MD    CC providers:  Dario Hardwick, [address] [id number]  [address] [place] [address], [phone]  [id number]  [address] [place]

## 2019-08-26 NOTE — PROGRESS NOTES
Molly Borjas MD, FAASM, [facility]  [facility] HEART AND SURGICAL [facility]  [facility]  3rd Floor,  [address] [facility], [address] [phone]   [address] [place] [address] [address] [place] [address] [address] . [address] [phone]     [address] [address] [phone]    Assessment:      Visit Diagnoses and Associated Orders     Obstructive sleep apnea syndrome   (New Problem)  -  Primary    Needs work up    Sleep Study with PAP Titration [87147 Custom]   - Future Order         Chronic obstructive pulmonary disease, unspecified COPD type ([facility] [address]   (Stable)      Sleep Study with PAP Titration [[id number] Custom]   - Future Order         Graves' disease   (Stable)           Seasonal allergic rhinitis due to pollen   (Stable)           Non morbid obesity, unspecified obesity type   (Stable)                  Plan:      Reviewed old records, pertinent data listed. Reviewed sleep study with patient as well as the pros and cons of multiple treatment options. At this time she would like to proceed with CPAP titration. Instructed her not to drive when drowsy and unless she has worn her machine at least 4 hrs the night before. Continue medications per PCP and other physicians. Will schedule a CPAP titration and send an order for DME equipment. Will follow her 8 wks after starting therapy. The chronic medical conditions listed are directly related to the primary diagnosis listed above. The management of the primary diagnosis affects the secondary diagnosis and vice versa. Continue meds for: COPD, Grave's, and allergic rhinitis. Pt would medically benefit from wt loss for JAMES (diet, exercise, surgical). Orders Placed This Encounter   Procedures    Sleep Study with PAP Titration          Subjective:     Patient ID: Donald Duverney is a 66 y.o. female.     Chief Complaint   Patient presents with    Sleep Apnea       HPI: submandibular and no tonsillar adenopathy present. Neurological: She is alert and oriented to person, place, and time. No cranial nerve deficit. Skin: Skin is warm, dry and intact. No cyanosis. Nails show no clubbing. Psychiatric: She has a normal mood and affect. Her speech is normal and behavior is normal. Judgment and thought content normal.   Nursing note and vitals reviewed.       Electronically signed by Rich Conley MD on8/26/2019 at 10:52 AM

## 2019-08-29 ENCOUNTER — OFFICE VISIT (OUTPATIENT)
Dept: INTERNAL MEDICINE CLINIC | Age: 59
End: 2019-08-29
Payer: COMMERCIAL

## 2019-08-29 ENCOUNTER — TELEPHONE (OUTPATIENT)
Dept: INTERNAL MEDICINE CLINIC | Age: 59
End: 2019-08-29

## 2019-08-29 VITALS — SYSTOLIC BLOOD PRESSURE: 130 MMHG | WEIGHT: 224 LBS | DIASTOLIC BLOOD PRESSURE: 80 MMHG | BODY MASS INDEX: 39.68 KG/M2

## 2019-08-29 DIAGNOSIS — L73.2 HIDRADENITIS SUPPURATIVA: Primary | ICD-10-CM

## 2019-08-29 DIAGNOSIS — B37.9 YEAST INFECTION: ICD-10-CM

## 2019-08-29 DIAGNOSIS — E66.9 OBESITY (BMI 30-39.9): ICD-10-CM

## 2019-08-29 PROCEDURE — 99214 OFFICE O/P EST MOD 30 MIN: CPT | Performed by: NURSE PRACTITIONER

## 2019-08-29 RX ORDER — DOXYCYCLINE HYCLATE 100 MG/1
100 CAPSULE ORAL 2 TIMES DAILY
Qty: 20 CAPSULE | Refills: 0 | Status: SHIPPED | OUTPATIENT
Start: 2019-08-29 | End: 2019-09-08

## 2019-08-29 RX ORDER — FLUCONAZOLE 150 MG/1
150 TABLET ORAL ONCE
Qty: 1 TABLET | Refills: 0 | Status: SHIPPED | OUTPATIENT
Start: 2019-08-29 | End: 2019-08-29

## 2019-08-29 ASSESSMENT — ENCOUNTER SYMPTOMS
GASTROINTESTINAL NEGATIVE: 1
EYES NEGATIVE: 1
RESPIRATORY NEGATIVE: 1

## 2019-08-31 PROBLEM — E66.9 OBESITY (BMI 30-39.9): Status: ACTIVE | Noted: 2019-08-31

## 2019-09-18 DIAGNOSIS — E05.00 GRAVES' DISEASE: ICD-10-CM

## 2019-09-18 DIAGNOSIS — E05.90 HYPERTHYROIDISM: Primary | ICD-10-CM

## 2019-09-19 ENCOUNTER — OFFICE VISIT (OUTPATIENT)
Dept: ENDOCRINOLOGY | Age: 59
End: 2019-09-19
Payer: COMMERCIAL

## 2019-09-19 VITALS
BODY MASS INDEX: 38.21 KG/M2 | DIASTOLIC BLOOD PRESSURE: 80 MMHG | HEIGHT: 64 IN | WEIGHT: 223.8 LBS | SYSTOLIC BLOOD PRESSURE: 135 MMHG | OXYGEN SATURATION: 99 % | HEART RATE: 65 BPM

## 2019-09-19 DIAGNOSIS — E05.00 GRAVES' DISEASE: ICD-10-CM

## 2019-09-19 DIAGNOSIS — E05.90 HYPERTHYROIDISM: ICD-10-CM

## 2019-09-19 LAB
ALBUMIN SERPL-MCNC: 4.2 G/DL (ref 3.4–5)
ALP BLD-CCNC: 81 U/L (ref 40–129)
ALT SERPL-CCNC: 11 U/L (ref 10–40)
AST SERPL-CCNC: 13 U/L (ref 15–37)
BILIRUB SERPL-MCNC: 0.4 MG/DL (ref 0–1)
BILIRUBIN DIRECT: <0.2 MG/DL (ref 0–0.3)
BILIRUBIN, INDIRECT: ABNORMAL MG/DL (ref 0–1)
T4 FREE: 1.4 NG/DL (ref 0.9–1.8)
TOTAL PROTEIN: 7.3 G/DL (ref 6.4–8.2)
TSH SERPL DL<=0.05 MIU/L-ACNC: 1.01 UIU/ML (ref 0.27–4.2)

## 2019-09-19 PROCEDURE — 99213 OFFICE O/P EST LOW 20 MIN: CPT | Performed by: INTERNAL MEDICINE

## 2019-09-19 RX ORDER — METHIMAZOLE 5 MG/1
TABLET ORAL
Qty: 45 TABLET | Refills: 3 | Status: CANCELLED | OUTPATIENT
Start: 2019-09-19

## 2019-09-19 RX ORDER — FLUTICASONE FUROATE AND VILANTEROL 100; 25 UG/1; UG/1
POWDER RESPIRATORY (INHALATION) DAILY
COMMUNITY
End: 2020-02-06 | Stop reason: SDUPTHER

## 2019-09-19 NOTE — PROGRESS NOTES
other day     Labs done today  Results pending    Will send message on my chart         2. Obesity. Tried contrave and sexadna.

## 2019-09-21 LAB — TSH RECEPTOR AB: 1.54 IU/L

## 2019-11-04 ENCOUNTER — HOSPITAL ENCOUNTER (EMERGENCY)
Age: 59
Discharge: HOME | End: 2019-11-04
Attending: EMERGENCY MEDICINE
Payer: COMMERCIAL

## 2019-11-04 VITALS
TEMPERATURE: 98.2 F | OXYGEN SATURATION: 97 % | HEART RATE: 83 BPM | SYSTOLIC BLOOD PRESSURE: 143 MMHG | WEIGHT: 222 LBS | RESPIRATION RATE: 20 BRPM | HEIGHT: 63 IN | BODY MASS INDEX: 39.34 KG/M2 | DIASTOLIC BLOOD PRESSURE: 80 MMHG

## 2019-11-04 DIAGNOSIS — I10 ESSENTIAL HYPERTENSION: ICD-10-CM

## 2019-11-04 DIAGNOSIS — R00.2 PALPITATIONS: Primary | ICD-10-CM

## 2019-11-04 LAB
ANION GAP SERPL CALCULATED.3IONS-SCNC: 12 MMOL/L (ref 3–16)
BASOPHILS ABSOLUTE: 0.1 K/UL (ref 0–0.2)
BASOPHILS RELATIVE PERCENT: 0.9 %
BUN BLDV-MCNC: 14 MG/DL (ref 7–20)
CALCIUM SERPL-MCNC: 9.4 MG/DL (ref 8.3–10.6)
CHLORIDE BLD-SCNC: 101 MMOL/L (ref 99–110)
CO2: 24 MMOL/L (ref 21–32)
CREAT SERPL-MCNC: 1.1 MG/DL (ref 0.6–1.1)
EKG ATRIAL RATE: 98 BPM
EKG DIAGNOSIS: NORMAL
EKG P AXIS: 55 DEGREES
EKG P-R INTERVAL: 164 MS
EKG Q-T INTERVAL: 356 MS
EKG QRS DURATION: 88 MS
EKG QTC CALCULATION (BAZETT): 454 MS
EKG R AXIS: 6 DEGREES
EKG T AXIS: 32 DEGREES
EKG VENTRICULAR RATE: 98 BPM
EOSINOPHILS ABSOLUTE: 0.1 K/UL (ref 0–0.6)
EOSINOPHILS RELATIVE PERCENT: 2.2 %
GFR AFRICAN AMERICAN: >60
GFR NON-AFRICAN AMERICAN: 51
GLUCOSE BLD-MCNC: 158 MG/DL (ref 70–99)
GLUCOSE BLD-MCNC: 174 MG/DL (ref 70–99)
HCG QUALITATIVE: NEGATIVE
HCT VFR BLD CALC: 44.9 % (ref 36–48)
HEMOGLOBIN: 15 G/DL (ref 12–16)
LYMPHOCYTES ABSOLUTE: 2.2 K/UL (ref 1–5.1)
LYMPHOCYTES RELATIVE PERCENT: 33.3 %
MCH RBC QN AUTO: 35.5 PG (ref 26–34)
MCHC RBC AUTO-ENTMCNC: 33.3 G/DL (ref 31–36)
MCV RBC AUTO: 106.8 FL (ref 80–100)
MONOCYTES ABSOLUTE: 0.5 K/UL (ref 0–1.3)
MONOCYTES RELATIVE PERCENT: 8.1 %
NEUTROPHILS ABSOLUTE: 3.7 K/UL (ref 1.7–7.7)
NEUTROPHILS RELATIVE PERCENT: 55.5 %
PDW BLD-RTO: 15.3 % (ref 12.4–15.4)
PERFORMED ON: ABNORMAL
PLATELET # BLD: 286 K/UL (ref 135–450)
PMV BLD AUTO: 8.6 FL (ref 5–10.5)
POTASSIUM SERPL-SCNC: 4 MMOL/L (ref 3.5–5.1)
PRO-BNP: 155 PG/ML (ref 0–124)
RBC # BLD: 4.21 M/UL (ref 4–5.2)
SODIUM BLD-SCNC: 137 MMOL/L (ref 136–145)
TROPONIN: <0.01 NG/ML
WBC # BLD: 6.7 K/UL (ref 4–11)

## 2019-11-04 PROCEDURE — 83880 ASSAY OF NATRIURETIC PEPTIDE: CPT

## 2019-11-04 PROCEDURE — 84484 ASSAY OF TROPONIN QUANT: CPT

## 2019-11-04 PROCEDURE — 99285 EMERGENCY DEPT VISIT HI MDM: CPT

## 2019-11-04 PROCEDURE — 80048 BASIC METABOLIC PNL TOTAL CA: CPT

## 2019-11-04 PROCEDURE — 93005 ELECTROCARDIOGRAM TRACING: CPT | Performed by: EMERGENCY MEDICINE

## 2019-11-04 PROCEDURE — 84703 CHORIONIC GONADOTROPIN ASSAY: CPT

## 2019-11-04 PROCEDURE — 93010 ELECTROCARDIOGRAM REPORT: CPT | Performed by: INTERNAL MEDICINE

## 2019-11-04 PROCEDURE — 85025 COMPLETE CBC W/AUTO DIFF WBC: CPT

## 2019-11-04 RX ORDER — HYDROCHLOROTHIAZIDE 25 MG/1
25 TABLET ORAL DAILY
Qty: 30 TABLET | Refills: 0 | Status: SHIPPED | OUTPATIENT
Start: 2019-11-04 | End: 2019-12-05 | Stop reason: SDUPTHER

## 2019-12-05 ENCOUNTER — OFFICE VISIT (OUTPATIENT)
Dept: INTERNAL MEDICINE CLINIC | Age: 59
End: 2019-12-05
Payer: COMMERCIAL

## 2019-12-05 VITALS
BODY MASS INDEX: 39.5 KG/M2 | HEART RATE: 69 BPM | TEMPERATURE: 98.2 F | DIASTOLIC BLOOD PRESSURE: 88 MMHG | SYSTOLIC BLOOD PRESSURE: 132 MMHG | WEIGHT: 223 LBS | OXYGEN SATURATION: 99 %

## 2019-12-05 DIAGNOSIS — I10 ESSENTIAL HYPERTENSION: Primary | ICD-10-CM

## 2019-12-05 PROBLEM — H15.101 EPISCLERITIS OF RIGHT EYE: Status: ACTIVE | Noted: 2019-12-05

## 2019-12-05 PROCEDURE — 99213 OFFICE O/P EST LOW 20 MIN: CPT | Performed by: INTERNAL MEDICINE

## 2019-12-05 RX ORDER — FEXOFENADINE HCL 180 MG/1
TABLET ORAL
Refills: 2 | COMMUNITY
Start: 2019-11-21 | End: 2020-01-15

## 2019-12-05 RX ORDER — HYDROCHLOROTHIAZIDE 25 MG/1
25 TABLET ORAL DAILY
Qty: 30 TABLET | Refills: 5 | Status: SHIPPED | OUTPATIENT
Start: 2019-12-05 | End: 2020-06-15

## 2019-12-07 ASSESSMENT — ENCOUNTER SYMPTOMS
COUGH: 0
EYE REDNESS: 0
CHOKING: 0

## 2019-12-17 ENCOUNTER — TELEPHONE (OUTPATIENT)
Dept: INTERNAL MEDICINE CLINIC | Age: 59
End: 2019-12-17

## 2020-01-07 ENCOUNTER — TELEPHONE (OUTPATIENT)
Dept: INTERNAL MEDICINE CLINIC | Age: 60
End: 2020-01-07

## 2020-01-15 RX ORDER — FEXOFENADINE HCL 180 MG/1
TABLET ORAL
Qty: 30 TABLET | Refills: 5 | Status: SHIPPED | OUTPATIENT
Start: 2020-01-15 | End: 2020-09-09

## 2020-02-06 ENCOUNTER — OFFICE VISIT (OUTPATIENT)
Dept: INTERNAL MEDICINE CLINIC | Age: 60
End: 2020-02-06
Payer: COMMERCIAL

## 2020-02-06 VITALS
HEART RATE: 121 BPM | DIASTOLIC BLOOD PRESSURE: 68 MMHG | BODY MASS INDEX: 38.44 KG/M2 | OXYGEN SATURATION: 97 % | WEIGHT: 217 LBS | SYSTOLIC BLOOD PRESSURE: 120 MMHG

## 2020-02-06 PROCEDURE — 99214 OFFICE O/P EST MOD 30 MIN: CPT | Performed by: INTERNAL MEDICINE

## 2020-02-06 RX ORDER — ALBUTEROL SULFATE 90 UG/1
2 AEROSOL, METERED RESPIRATORY (INHALATION) EVERY 6 HOURS PRN
Qty: 1 INHALER | Refills: 6 | Status: SHIPPED | OUTPATIENT
Start: 2020-02-06 | End: 2021-09-10 | Stop reason: SDUPTHER

## 2020-02-06 RX ORDER — FLUTICASONE FUROATE AND VILANTEROL 100; 25 UG/1; UG/1
1 POWDER RESPIRATORY (INHALATION) DAILY
Qty: 2 EACH | Refills: 0 | Status: SHIPPED | OUTPATIENT
Start: 2020-02-06 | End: 2020-05-07 | Stop reason: SDUPTHER

## 2020-02-06 ASSESSMENT — PATIENT HEALTH QUESTIONNAIRE - PHQ9
1. LITTLE INTEREST OR PLEASURE IN DOING THINGS: 0
SUM OF ALL RESPONSES TO PHQ QUESTIONS 1-9: 0
2. FEELING DOWN, DEPRESSED OR HOPELESS: 0
SUM OF ALL RESPONSES TO PHQ QUESTIONS 1-9: 0
SUM OF ALL RESPONSES TO PHQ9 QUESTIONS 1 & 2: 0

## 2020-02-06 ASSESSMENT — ENCOUNTER SYMPTOMS
FACIAL SWELLING: 0
COUGH: 0
EYE PAIN: 0
CHOKING: 0
EYE REDNESS: 0

## 2020-02-14 ENCOUNTER — TELEPHONE (OUTPATIENT)
Dept: INTERNAL MEDICINE CLINIC | Age: 60
End: 2020-02-14

## 2020-03-12 ENCOUNTER — TELEPHONE (OUTPATIENT)
Dept: INTERNAL MEDICINE CLINIC | Age: 60
End: 2020-03-12

## 2020-03-12 RX ORDER — PROMETHAZINE HYDROCHLORIDE 6.25 MG/5ML
6.25 SYRUP ORAL 4 TIMES DAILY PRN
Qty: 118 ML | Refills: 1 | Status: SHIPPED | OUTPATIENT
Start: 2020-03-12 | End: 2020-03-19 | Stop reason: ALTCHOICE

## 2020-03-12 RX ORDER — CEFDINIR 300 MG/1
300 CAPSULE ORAL 2 TIMES DAILY
Qty: 14 CAPSULE | Refills: 0 | Status: SHIPPED | OUTPATIENT
Start: 2020-03-12 | End: 2020-03-19 | Stop reason: CLARIF

## 2020-03-18 ENCOUNTER — TELEPHONE (OUTPATIENT)
Dept: ENDOCRINOLOGY | Age: 60
End: 2020-03-18

## 2020-03-18 NOTE — TELEPHONE ENCOUNTER
Patient is calling to see if she needs bloodwork prior to her appointment.     If needed please put order in epic

## 2020-03-19 ENCOUNTER — OFFICE VISIT (OUTPATIENT)
Dept: ENDOCRINOLOGY | Age: 60
End: 2020-03-19
Payer: COMMERCIAL

## 2020-03-19 VITALS
BODY MASS INDEX: 39.34 KG/M2 | HEART RATE: 70 BPM | RESPIRATION RATE: 14 BRPM | SYSTOLIC BLOOD PRESSURE: 138 MMHG | WEIGHT: 222 LBS | DIASTOLIC BLOOD PRESSURE: 80 MMHG | HEIGHT: 63 IN

## 2020-03-19 DIAGNOSIS — E05.00 GRAVES' DISEASE: ICD-10-CM

## 2020-03-19 DIAGNOSIS — E05.90 HYPERTHYROIDISM: ICD-10-CM

## 2020-03-19 LAB
A/G RATIO: 1.1 (ref 1.1–2.2)
ALBUMIN SERPL-MCNC: 4.2 G/DL (ref 3.4–5)
ALP BLD-CCNC: 81 U/L (ref 40–129)
ALT SERPL-CCNC: 13 U/L (ref 10–40)
ANION GAP SERPL CALCULATED.3IONS-SCNC: 15 MMOL/L (ref 3–16)
AST SERPL-CCNC: 16 U/L (ref 15–37)
BILIRUB SERPL-MCNC: 0.4 MG/DL (ref 0–1)
BUN BLDV-MCNC: 13 MG/DL (ref 7–20)
CALCIUM SERPL-MCNC: 9.4 MG/DL (ref 8.3–10.6)
CHLORIDE BLD-SCNC: 97 MMOL/L (ref 99–110)
CO2: 29 MMOL/L (ref 21–32)
CREAT SERPL-MCNC: 1 MG/DL (ref 0.6–1.1)
GFR AFRICAN AMERICAN: >60
GFR NON-AFRICAN AMERICAN: 57
GLOBULIN: 3.7 G/DL
GLUCOSE BLD-MCNC: 106 MG/DL (ref 70–99)
POTASSIUM SERPL-SCNC: 4.3 MMOL/L (ref 3.5–5.1)
SODIUM BLD-SCNC: 141 MMOL/L (ref 136–145)
T4 FREE: 1.3 NG/DL (ref 0.9–1.8)
TOTAL PROTEIN: 7.9 G/DL (ref 6.4–8.2)
TSH SERPL DL<=0.05 MIU/L-ACNC: 0.56 UIU/ML (ref 0.27–4.2)

## 2020-03-19 PROCEDURE — 99213 OFFICE O/P EST LOW 20 MIN: CPT | Performed by: INTERNAL MEDICINE

## 2020-03-19 RX ORDER — METHIMAZOLE 5 MG/1
TABLET ORAL
Qty: 45 TABLET | Refills: 3 | Status: SHIPPED | OUTPATIENT
Start: 2020-03-19 | End: 2020-09-21 | Stop reason: SDUPTHER

## 2020-03-19 NOTE — PROGRESS NOTES
Endocrinology  Kierra Dozier M.D. Phone: [phone]   FAX: [phone]       [address]   YOB: 1960    Date of Visit:  3/19/2020    Allergies   Allergen Reactions    Azithromycin Hives     Outpatient Medications Marked as Taking for the 3/19/20 encounter (Office Visit) with Lakisha Love MD   Medication Sig Dispense Refill    albuterol sulfate  (90 Base) MCG/ACT inhaler Inhale 2 puffs into the lungs every 6 hours as needed for Wheezing or Shortness of Breath 1 Inhaler 6    Semaglutide (RYBELSUS) 7 MG TABS Take 7 mg by mouth daily Patient to bring voucher 30 tablet 11    fluticasone-vilanterol (BREO ELLIPTA) 100-25 MCG/INH AEPB inhaler Inhale 1 puff into the lungs daily 2 each 0    fexofenadine (ALLEGRA) 180 MG tablet TAKE 1 TABLET BY MOUTH DAILY 30 tablet 5    hydrochlorothiazide (HYDRODIURIL) 25 MG tablet Take 1 tablet by mouth daily 30 tablet 5    Handicap Placard MISC One year 1 each 0    fluticasone (FLONASE) 50 MCG/ACT nasal spray 2 sprays in each nostril daily. 1 Bottle 2    montelukast (SINGULAIR) 10 MG tablet TAKE 1 TABLET BY MOUTH NIGHTLY 90 tablet 3    methimazole (TAPAZOLE) 5 MG tablet TAKE ONE TABLET BY MOUTH EVERY OTHER DAY 45 tablet 3    ibuprofen (ADVIL;MOTRIN) 800 MG tablet Take 1 tablet by mouth every 8 hours as needed for Pain 90 tablet 5         Vitals:    03/19/20 1522   BP: (!) 148/80   Pulse: 70   Resp: 14   Weight: 222 lb (100.7 kg)   Height: 5' 3\" (1.6 m)     Body mass index is 39.33 kg/m².      Wt Readings from Last 3 Encounters:   03/19/20 222 lb (100.7 kg)   02/06/20 217 lb (98.4 kg)   12/05/19 223 lb (101.2 kg)     BP Readings from Last 3 Encounters:   03/19/20 (!) 148/80   02/06/20 120/68   12/05/19 132/88        Past Medical History:   Diagnosis Date    Chronic back pain     COPD (chronic obstructive pulmonary disease) ([facility] [address]     confirmed with PFT     HTN, goal below 140/90     Hyperthyroidism     Grave's dz    Obesity (BMI 30-39. 9) 8/31/2019    Red eyes     Restrictive lung disease     secondary to weight    Surgical menopause     LMP at age 39     Past Surgical History:   Procedure Laterality Date    HERNIA REPAIR      20 years ago     HYSTERECTOMY      10 years ago; fibroid    SHOULDER SURGERY      right arthroscopy     Family History   Problem Relation Age of Onset   [place] Cancer Mother         unknown    Liver Disease Mother     Kidney Disease Mother     Diabetes Father     Lupus Sister      Social History     Tobacco Use   Smoking Status Never Smoker   Smokeless Tobacco Never Used      Social History     Substance and Sexual Activity   Alcohol Use Yes    Alcohol/week: 2.0 standard drinks    Types: 2 Glasses of wine per week       KARISSA De Souza is a 64 y.o. female who is here for a follow-up for management of hyperthyroidism    PCP  Stephany Gandhi MD       She has a PMH of Asthma, hypertension and chronic back pain. Patient was found to have a low TSH ( <0.01 ) and high FT4 (3.8 ) on labs done on 06/11/14. Repeat labs confirmed hyperthyroidism. Thyroid uptake and scan was consistent with Graves disease. No FH of thyroid disease. She was started on tapazole 10 mg BID. She developed a rash and stopped it for 2-3 days. She restarted it at 10 mg daily. Has to take hydroxyzine with it to control itching. Took  tapazole  5 mg daily since 04/16---> 06/17    Resumed in 2018. Now on Methimazole 5 mg every other day. She is experiencing occasional  increased sweating    No palpitations, tremors. No diarrhea    She has lost 15 lbs since last visit in 06/18    Review of Systems    Negative for fever, chills, weight loss and diaphoresis. HENT: Negative for neck pain. Eyes: Negative for blurred vision, double vision and photophobia. Respiratory: Negative for cough and hemoptysis. Cardiovascular: Negative for chest pain, palpitations and orthopnea.    Genitourinary: Negative for dysuria, urgency, frequency, hematuria and flank pain. Musculoskeletal:  Negative for back pain, joint pain and falls. Skin: Negative for itching and rash. Neurological:Negative for dizziness, tingling, tremors, sensory change, speech change, focal weakness, seizures and loss of consciousness. Endo/Heme/Allergies: Negative for environmental allergies and polydipsia. Does not bruise/bleed easily. Negative for suicidal ideas, hallucinations, memory loss and substance abuse. The patient is not nervous/anxious and does not have insomnia. Physical Exam   Constitutional: She is oriented to person, place, and time. She appears well-developed. No distress. HENT:   Mouth/Throat: Oropharynx is clear and moist.   Eyes: EOM are normal.   Neck: Thyromegaly present. Cardiovascular: Normal rate and normal heart sounds. Pulmonary/Chest: Effort normal. No respiratory distress. She has no wheezes. Abdominal: Soft. Bowel sounds are normal. There is no abdominal tenderness. Musculoskeletal:         General: No edema. Neurological: She is alert and oriented to person, place, and time. Skin: Skin is warm and dry. She is not diaphoretic. Psychiatric: Her behavior is normal. Thought content normal.         Thyroid US 06/14 INDICATION: Hyperthyroidism. COMPARISON: None. FINDINGS: Sonography of the thyroid gland was performed. The entire thyroid gland is enlarged and heterogeneous in echo texture. The right lobe measures 5.6 x 2.1 x 3.1 cm. The left lobe measures 5.8 x 2.2 x 2.8 cm. There is no discrete right thyroid nodules identified. Within the left thyroid lobe, scattered cysts are noted with no discrete nodule. THYROID UPTAKE SCAN. JUL 02, 2014 08:31:52 AM: INDICATION: \"Hyperthyroidism. \" COMPARISON: Ultrasound thyroid, 6/25/2014. FINDINGS: 24 hours following the administration of the Iodine-123 capsule, the uptake is calculated as 69.9%.  The uptake pattern is symmetrical. The thyroid gland is globally mild exophthalmos. Cleared for MCKENZIE as per ophthalmology. TSH 0.13---> 0.04---> 0.56  Free T4 of 1.4--->  1.7---> 1.3    On methimazole 5 mg every other day       Continue same dose. 2. Obesity. Tried contrave and sexadna. Using oral Semaglutide as per PCP.      3. HTN managed by PCP

## 2020-04-20 ENCOUNTER — TELEPHONE (OUTPATIENT)
Dept: INTERNAL MEDICINE CLINIC | Age: 60
End: 2020-04-20

## 2020-04-20 RX ORDER — ORAL SEMAGLUTIDE 3 MG/1
6 TABLET ORAL DAILY
Qty: 30 TABLET | Refills: 0 | COMMUNITY
Start: 2020-04-20 | End: 2020-05-07 | Stop reason: SINTOL

## 2020-05-07 ENCOUNTER — OFFICE VISIT (OUTPATIENT)
Dept: INTERNAL MEDICINE CLINIC | Age: 60
End: 2020-05-07
Payer: COMMERCIAL

## 2020-05-07 VITALS
OXYGEN SATURATION: 97 % | SYSTOLIC BLOOD PRESSURE: 130 MMHG | WEIGHT: 221 LBS | HEART RATE: 77 BPM | DIASTOLIC BLOOD PRESSURE: 80 MMHG | TEMPERATURE: 96.7 F | BODY MASS INDEX: 39.15 KG/M2

## 2020-05-07 PROCEDURE — 99213 OFFICE O/P EST LOW 20 MIN: CPT | Performed by: INTERNAL MEDICINE

## 2020-05-07 RX ORDER — FLUTICASONE FUROATE AND VILANTEROL TRIFENATATE 100; 25 UG/1; UG/1
1 POWDER RESPIRATORY (INHALATION) DAILY
Qty: 60 EACH | Refills: 11 | Status: SHIPPED | OUTPATIENT
Start: 2020-05-07 | End: 2020-09-16 | Stop reason: SDUPTHER

## 2020-05-07 RX ORDER — ELECTROLYTES/DEXTROSE
1 SOLUTION, ORAL ORAL DAILY
Qty: 30 TABLET | Refills: 11 | Status: SHIPPED | OUTPATIENT
Start: 2020-05-07 | End: 2022-09-23 | Stop reason: ALTCHOICE

## 2020-05-07 RX ORDER — MULTIVIT-MIN/IRON/FOLIC ACID/K 18-600-40
1 CAPSULE ORAL DAILY
Qty: 30 CAPSULE | Refills: 11 | Status: SHIPPED | OUTPATIENT
Start: 2020-05-07 | End: 2021-09-10 | Stop reason: SDUPTHER

## 2020-05-07 RX ORDER — IBUPROFEN 800 MG/1
800 TABLET ORAL EVERY 8 HOURS PRN
Qty: 90 TABLET | Refills: 5 | Status: SHIPPED | OUTPATIENT
Start: 2020-05-07 | End: 2021-07-21 | Stop reason: SDUPTHER

## 2020-05-07 ASSESSMENT — ASTHMA QUESTIONNAIRES
QUESTION_3 LAST FOUR WEEKS HOW OFTEN DID YOUR ASTHMA SYMPTOMS (WHEEZING, COUGHING, SHORTNESS OF BREATH, CHEST TIGHTNESS OR PAIN) WAKE YOU UP AT NIGHT OR EARLIER THAN USUAL IN THE MORNING: 3
QUESTION_2 LAST FOUR WEEKS HOW OFTEN HAVE YOU HAD SHORTNESS OF BREATH: 4
ACT_TOTALSCORE: 19
QUESTION_1 LAST FOUR WEEKS HOW MUCH OF THE TIME DID YOUR ASTHMA KEEP YOU FROM GETTING AS MUCH DONE AT WORK, SCHOOL OR AT HOME: 4
QUESTION_4 LAST FOUR WEEKS HOW OFTEN HAVE YOU USED YOUR RESCUE INHALER OR NEBULIZER MEDICATION (SUCH AS ALBUTEROL): 4
QUESTION_5 LAST FOUR WEEKS HOW WOULD YOU RATE YOUR ASTHMA CONTROL: 4

## 2020-05-07 NOTE — PATIENT INSTRUCTIONS
without using your back muscles. As your press up, do not let your hips or pelvis come off the floor. 3. Hold for 15 to 30 seconds, then relax. 4. Repeat 2 to 4 times. Alternate arm and leg (bird dog) exercise   1. Start on the floor, on your hands and knees. 2. Tighten your belly muscles. 3. Raise one leg off the floor, and hold it straight out behind you. Be careful not to let your hip drop down, because that will twist your trunk. 4. Hold for about 6 seconds, then lower your leg and switch to the other leg. 5. Repeat 8 to 12 times on each leg. 6. Over time, work up to holding for 10 to 30 seconds each time. 7. If you feel stable and secure with your leg raised, try raising the opposite arm straight out in front of you at the same time. Knee-to-chest exercise   1. Lie on your back with your knees bent and your feet flat on the floor. 2. Bring one knee to your chest, keeping the other foot flat on the floor (or keeping the other leg straight, whichever feels better on your lower back). 3. Keep your lower back pressed to the floor. Hold for at least 15 to 30 seconds. 4. Relax, and lower the knee to the starting position. 5. Repeat with the other leg. Repeat 2 to 4 times with each leg. 6. To get more stretch, put your other leg flat on the floor while pulling your knee to your chest.    Curl-ups   1. Lie on the floor on your back with your knees bent at a 90-degree angle. Your feet should be flat on the floor, about 12 inches from your buttocks. 2. Cross your arms over your chest. If this bothers your neck, try putting your hands behind your neck (not your head), with your elbows spread apart. 3. Slowly tighten your belly muscles and raise your shoulder blades off the floor. 4. Keep your head in line with your body, and do not press your chin to your chest.  5. Hold this position for 1 or 2 seconds, then slowly lower yourself back down to the floor. 6. Repeat 8 to 12 times.     Pelvic tilt lower back into the wall. 4. Hold for about 6 seconds, then slide back up the wall. 5. Repeat 8 to 12 times. Follow-up care is a key part of your treatment and safety. Be sure to make and go to all appointments, and call your doctor if you are having problems. It's also a good idea to know your test results and keep a list of the medicines you take. Where can you learn more? Go to https://chpeanitraewyuan.[employer]idFall.com. org and sign in to your [employer] account. Enter A316 in the "[employer] box to learn more about \"Low Back Pain: Exercises. \"     If you do not have an account, please click on the \"Sign Up Now\" link. Current as of: June 26, 2019Content Version: 12.4  © 3727-0070 [employer]wise, Incorporated. Care instructions adapted under license by [facility] [place] ([facility]). If you have questions about a medical condition or this instruction, always ask your healthcare professional. [place] [address] any warranty or liability for your use of this information. Patient Education        Acute Low Back Pain: Exercises  Introduction  Here are some examples of typical rehabilitation exercises for your condition. Start each exercise slowly. Ease off the exercise if you start to have pain. Your doctor or physical therapist will tell you when you can start these exercises and which ones will work best for you. When you are not being active, find a comfortable position for rest. Some people are comfortable on the floor or a medium-firm bed with a small pillow under their head and another under their knees. Some people prefer to lie on their side with a pillow between their knees. Don't stay in one position for too long. Take short walks (10 to 20 minutes) every 2 to 3 hours. Avoid slopes, hills, and stairs until you feel better. Walk only distances you can manage without pain, especially leg pain. How to do the exercises  Back stretches   7.  Get down on your hands and knees on the

## 2020-05-07 NOTE — PROGRESS NOTES
MOUTH NIGHTLY Yes Yanelis Wattss, APRN - CNP        Social History     Tobacco Use    Smoking status: Never Smoker    Smokeless tobacco: Never Used   Substance Use Topics    Alcohol use: Yes     Alcohol/week: 2.0 standard drinks     Types: 2 Glasses of wine per week      Vitals:    05/07/20 1549   BP: 130/80   Site: Right Upper Arm   Position: Sitting   Cuff Size: Large Adult   Pulse: 77   Temp: 96.7 °F (35.9 °C)   TempSrc: Infrared   SpO2: 97%   Weight: 221 lb (100.2 kg)      Wt Readings from Last 3 Encounters:   05/07/20 221 lb (100.2 kg)   03/19/20 222 lb (100.7 kg)   02/06/20 217 lb (98.4 kg)     BP Readings from Last 3 Encounters:   05/07/20 130/80   03/19/20 138/80   02/06/20 120/68     Body mass index is 39.15 kg/m². Facility age limit for growth percentiles is 20 years. Physical Exam  Constitutional:       Appearance: Normal appearance. HENT:      Head: Normocephalic. Right Ear: Tympanic membrane, ear canal and external ear normal.      Left Ear: Tympanic membrane, ear canal and external ear normal.      Nose: Nose normal. No congestion or rhinorrhea. Mouth/Throat:      Mouth: Mucous membranes are moist.      Pharynx: No oropharyngeal exudate or posterior oropharyngeal erythema. Neck:      Musculoskeletal: Normal range of motion and neck supple. No neck rigidity or muscular tenderness. Cardiovascular:      Rate and Rhythm: Normal rate and regular rhythm. Pulses: Normal pulses. Heart sounds: No murmur. No friction rub. Pulmonary:      Effort: Pulmonary effort is normal. No respiratory distress. Breath sounds: No stridor. No wheezing. Neurological:      Mental Status: She is alert. ASTHMA CONTROL TEST 5/7/2020   In the past 4 weeks, how much of the time did your asthma keep you from getting as much done at work, school or at home? 4   During the past 4 weeks, how often have you had shortness of breath?  4   During the past 4 weeks, how often did your asthma symptoms (wheezing, coughing, shortness of breath, chest tightness or pain) wake you up at night or earlier than usual in the morning? 3   During the past 4 weeks, how often have you used your rescue inhaler or nebulizer medication (such as albuterol)? 4   How would you rate your asthma control during the past 4 weeks? 4   Asthma Control Test Total Score 19     ASSESSMENT/PLAN:  1. Moderate persistent asthma without complication  stable  - fluticasone-vilanterol (BREO ELLIPTA) 100-25 MCG/INH AEPB inhaler; Inhale 1 puff into the lungs daily  Dispense: 60 each; Refill: 11    2. DDD (degenerative disc disease), lumbar  stable  - ibuprofen (ADVIL;MOTRIN) 800 MG tablet; Take 1 tablet by mouth every 8 hours as needed for Pain  Dispense: 90 tablet; Refill: 5      Return in about 4 months (around 9/7/2020) for Annual Physical.    An electronic signature was used to authenticate this note.     --Cristian Chavarria MD on 5/8/2020 at 2:26 PM

## 2020-05-08 ASSESSMENT — ENCOUNTER SYMPTOMS
SHORTNESS OF BREATH: 0
COUGH: 0

## 2020-06-15 RX ORDER — HYDROCHLOROTHIAZIDE 25 MG/1
TABLET ORAL
Qty: 30 TABLET | Refills: 5 | Status: SHIPPED | OUTPATIENT
Start: 2020-06-15 | End: 2021-01-08 | Stop reason: SDUPTHER

## 2020-07-07 ENCOUNTER — TELEPHONE (OUTPATIENT)
Dept: INTERNAL MEDICINE CLINIC | Age: 60
End: 2020-07-07

## 2020-07-07 NOTE — LETTER
[address] [facility]  [address] [facility] [address] [id number]  Phone: [phone]  Fax: [phone]    Inna Stacy MD        July 7, 2020     Patient: Júnior Sagastume   YOB: 1960   Date of Visit: 7/7/2020       To Whom It May Concern: It is my medical opinion that Goyo Martinez requires a disability parking placard for the following reasons:  She is restricted by Chronic Obstructive Lung Disease  Duration of need: 3 years    If you have any questions or concerns, please don't hesitate to call.     Sincerely,        Corey Hand MD, MSc, [place], [address] [facility] Internal Medicine-Pediatrics  [address] [place] [address].

## 2020-08-24 ENCOUNTER — TELEPHONE (OUTPATIENT)
Dept: INTERNAL MEDICINE CLINIC | Age: 60
End: 2020-08-24

## 2020-08-24 RX ORDER — ORAL SEMAGLUTIDE 3 MG/1
1 TABLET ORAL DAILY
Qty: 30 TABLET | Refills: 0 | COMMUNITY
Start: 2020-08-24 | End: 2020-09-09 | Stop reason: SDUPTHER

## 2020-08-24 NOTE — TELEPHONE ENCOUNTER
Documented in the chart. I attempted to complete the patient's Admission Navigator with the patient, but because the patient is a poor historian, I am unsure of the accuracy of the answers given  I informed the dayshift nurse, who will go over the questions with the patient's family if they come to visit today

## 2020-09-09 ENCOUNTER — OFFICE VISIT (OUTPATIENT)
Dept: INTERNAL MEDICINE CLINIC | Age: 60
End: 2020-09-09
Payer: COMMERCIAL

## 2020-09-09 VITALS
WEIGHT: 227 LBS | OXYGEN SATURATION: 98 % | DIASTOLIC BLOOD PRESSURE: 66 MMHG | BODY MASS INDEX: 40.22 KG/M2 | SYSTOLIC BLOOD PRESSURE: 110 MMHG | TEMPERATURE: 97.3 F | HEART RATE: 71 BPM | HEIGHT: 63 IN

## 2020-09-09 PROCEDURE — 99396 PREV VISIT EST AGE 40-64: CPT | Performed by: INTERNAL MEDICINE

## 2020-09-09 RX ORDER — FLUTICASONE PROPIONATE 50 MCG
SPRAY, SUSPENSION (ML) NASAL
Qty: 1 BOTTLE | Refills: 11 | Status: SHIPPED | OUTPATIENT
Start: 2020-09-09

## 2020-09-09 RX ORDER — HYDROXYZINE HYDROCHLORIDE 25 MG/1
TABLET, FILM COATED ORAL
Qty: 120 TABLET | Refills: 0 | Status: SHIPPED | OUTPATIENT
Start: 2020-09-09 | End: 2021-03-26 | Stop reason: SINTOL

## 2020-09-09 RX ORDER — ORAL SEMAGLUTIDE 3 MG/1
1 TABLET ORAL DAILY
Qty: 30 TABLET | Refills: 0 | COMMUNITY
Start: 2020-09-09 | End: 2020-09-21 | Stop reason: DRUGHIGH

## 2020-09-09 RX ORDER — FEXOFENADINE HCL 180 MG/1
180 TABLET ORAL DAILY
Qty: 30 TABLET | Refills: 5 | Status: SHIPPED | OUTPATIENT
Start: 2020-09-09 | End: 2021-01-08

## 2020-09-09 ASSESSMENT — ENCOUNTER SYMPTOMS
EYE REDNESS: 0
SHORTNESS OF BREATH: 0
EYE PAIN: 0
COUGH: 0
FACIAL SWELLING: 0

## 2020-09-09 NOTE — PROGRESS NOTES
2020    Candice Narayan (:  1960) is a 64 y.o. female, here for a preventive medicine evaluation. Patient Active Problem List   Diagnosis    Hyperthyroidism    Graves' disease    Asthma    Restrictive lung disease    COPD [facility]chronic obstructive pulmonary disease) ([facility] [address]    Seasonal allergic rhinitis due to pollen    PND (post-nasal drip)    Phlegm in throat    Sleep apnea in adult    Non morbid obesity, unspecified obesity type    Obesity (BMI 30-39. 9)    Episcleritis of right eye       Review of Systems   Constitutional: Negative for diaphoresis and fatigue. HENT: Negative for facial swelling and hearing loss. Eyes: Negative for pain and redness. Respiratory: Negative for cough and shortness of breath. Prior to Visit Medications    Medication Sig Taking? Authorizing Provider   hydrOXYzine (ATARAX) 25 MG tablet TAKE 1 TABLET BY MOUTH EVERY SIX HOURS AS NEEDED FOR ITCHING Yes Jacques Pierre MD   fexofenadine (ALLEGRA) 180 MG tablet Take 1 tablet by mouth daily Yes Jacques Pierre MD   Semaglutide (RYBELSUS) 3 MG TABS Take 1 tablet by mouth daily Yes Jacques Pierre MD   fluticasone [facility]) 50 MCG/ACT nasal spray 2 sprays in each nostril daily.  Yes Jacques Pierre MD   hydroCHLOROthiazide (HYDRODIURIL) 25 MG tablet TAKE 1 TABLET BY MOUTH ONE TIME A DAY Yes Jacques Pierre MD   fluticasone-vilanterol (BREO ELLIPTA) 100-25 MCG/INH AEPB inhaler Inhale 1 puff into the lungs daily Yes Jacques Pierre MD   ibuprofen (ADVIL;MOTRIN) 800 MG tablet Take 1 tablet by mouth every 8 hours as needed for Pain Yes Jacques Pierre MD   Cholecalciferol (VITAMIN D) 50 MCG ( UT) CAPS capsule Take 1 capsule by mouth daily Yes Jacques Pierre MD   Multiple Vitamins-Minerals (MULTIVITAMIN ADULT) TABS Take 1 tablet by mouth daily Yes Jacques Pierre MD   methIMAzole (TAPAZOLE) 5 MG tablet TAKE ONE TABLET BY MOUTH EVERY OTHER DAY Yes Tim Ray MD   albuterol sulfate  (90 Base) MCG/ACT inhaler Inhale 2 puffs into the lungs every 6 hours as needed for Wheezing or Shortness of Breath Yes Surya Mosley MD   Semaglutide (RYBELSUS) 7 MG TABS Take 7 mg by mouth daily Patient to bring voucher Yes Surya Mosley MD   Handicap Placard MISC One year Yes SHANIQUA Salomon - CNP   montelukast (SINGULAIR) 10 MG tablet TAKE 1 TABLET BY MOUTH NIGHTLY Yes SHANIQUA Rodgers - MONICA        Allergies   Allergen Reactions    Azithromycin Hives       Past Medical History:   Diagnosis Date    Chronic back pain     COPD (chronic obstructive pulmonary disease) ([facility] [address]     confirmed with PFT     HTN, goal below 140/90     Hyperthyroidism     Grave's dz    Obesity (BMI 30-39. 9) 8/31/2019    Red eyes     Restrictive lung disease     secondary to weight    Surgical menopause     LMP at age 39       Past Surgical History:   Procedure Laterality Date    HERNIA REPAIR      20 years ago     HYSTERECTOMY      10 years ago; fibroid    SHOULDER SURGERY      right arthroscopy       Social History     Socioeconomic History    Marital status: Single     Spouse name: Not on file    Number of children: Not on file    Years of education: Not on file    Highest education level: Not on file   Occupational History    Occupation: inventory       Comment: [employer]    Social Needs    Financial resource strain: Not on file    Food insecurity     Worry: Not on file     Inability: Not on file    Transportation needs     Medical: Not on file     Non-medical: Not on file   Tobacco Use    Smoking status: Never Smoker    Smokeless tobacco: Never Used   Substance and Sexual Activity    Alcohol use:  Yes     Alcohol/week: 2.0 standard drinks     Types: 2 Glasses of wine per week    Drug use: No    Sexual activity: Yes     Partners: Male     Birth control/protection: Surgical   Lifestyle    Physical activity     Days per week: Not on file     Minutes per session: Not on file    Stress: Not on file   Relationships    Social connections     Talks on phone: Not on file     Gets together: Not on file     Attends [employer] service: Not on file     Active member of club or organization: Not on file     Attends meetings of clubs or organizations: Not on file     Relationship status: Not on file    Intimate partner violence     Fear of current or ex partner: Not on file     Emotionally abused: Not on file     Physically abused: Not on file     Forced sexual activity: Not on file   Other Topics Concern    Not on file   Social History Narrative    Not on file        Family History   Problem Relation Age of Onset    Cancer Mother         unknown    Liver Disease Mother     Kidney Disease Mother     Diabetes Father     Lupus Sister        ADVANCE DIRECTIVE: N, <no information>  Vitals:    09/09/20 1436   BP: 110/66   Site: Left Upper Arm   Position: Sitting   Cuff Size: Large Adult   Pulse: 71   Temp: 97.3 °F (36.3 °C)   TempSrc: Infrared   SpO2: 98%   Weight: 227 lb (103 kg)   Height: 5' 3\" (1.6 m)      Wt Readings from Last 3 Encounters:   09/09/20 227 lb (103 kg)   05/07/20 221 lb (100.2 kg)   03/19/20 222 lb (100.7 kg)     BP Readings from Last 3 Encounters:   09/09/20 110/66   05/07/20 130/80   03/19/20 138/80     Body mass index is 40.21 kg/m². Facility age limit for growth percentiles is 20 years. Physical Exam  Constitutional:       General: She is not in acute distress. Appearance: Normal appearance. She is not ill-appearing. HENT:      Head: Normocephalic and atraumatic. Right Ear: Tympanic membrane and ear canal normal.      Left Ear: Tympanic membrane and ear canal normal.      Nose: Nose normal. No congestion or rhinorrhea. Mouth/Throat:      Mouth: Mucous membranes are moist.      Pharynx: No oropharyngeal exudate or posterior oropharyngeal erythema.    Eyes:      General:         Right eye: No 07/12/2024    Colon cancer screen colonoscopy  07/30/2024    DTaP/Tdap/Td vaccine (3 - Td) 03/25/2026    Pneumococcal 0-64 years Vaccine  Completed    Hepatitis C screen  Completed    HIV screen  Completed    Hepatitis A vaccine  Aged Out    Hepatitis B vaccine  Aged Out    Hib vaccine  Aged Out    Meningococcal (ACWY) vaccine  Aged Out       ASSESSMENT/PLAN:  1. Well adult exam  stable  - Comprehensive Metabolic Panel; Future  - Lipid Panel; Future  - Hemoglobin A1C; Future  -  Increase exercise    2. Essential hypertension  Stable  -  Continue blood pressure medications    3. Hyperthyroidism  stable    4. Non morbid obesity, unspecified obesity type  stable  - Semaglutide (RYBELSUS) 3 MG TABS; Take 1 tablet by mouth daily  Dispense: 30 tablet; Refill: 0    5. Seasonal allergic rhinitis due to pollen  stable  - fluticasone (FLONASE) 50 MCG/ACT nasal spray; 2 sprays in each nostril daily. Dispense: 1 Bottle; Refill: 11    6. PND (post-nasal drip)  stable  - fluticasone (FLONASE) 50 MCG/ACT nasal spray; 2 sprays in each nostril daily. Dispense: 1 Bottle; Refill: 11      Return in about 4 months (around 1/9/2021). An electronic signature was used to authenticate this note.     --Jonathon Watson MD on 9/11/2020 at 7:36 AM

## 2020-09-10 ENCOUNTER — HOSPITAL ENCOUNTER (OUTPATIENT)
Dept: MAMMOGRAPHY | Age: 60
Discharge: HOME | End: 2020-09-14
Payer: COMMERCIAL

## 2020-09-10 DIAGNOSIS — Z00.00 WELL ADULT EXAM: ICD-10-CM

## 2020-09-10 LAB
A/G RATIO: 1.3 (ref 1.1–2.2)
ALBUMIN SERPL-MCNC: 4.3 G/DL (ref 3.4–5)
ALP BLD-CCNC: 78 U/L (ref 40–129)
ALT SERPL-CCNC: 14 U/L (ref 10–40)
ANION GAP SERPL CALCULATED.3IONS-SCNC: 11 MMOL/L (ref 3–16)
AST SERPL-CCNC: 16 U/L (ref 15–37)
BILIRUB SERPL-MCNC: 0.4 MG/DL (ref 0–1)
BUN BLDV-MCNC: 16 MG/DL (ref 7–20)
CALCIUM SERPL-MCNC: 9.6 MG/DL (ref 8.3–10.6)
CHLORIDE BLD-SCNC: 101 MMOL/L (ref 99–110)
CHOLESTEROL, TOTAL: 215 MG/DL (ref 0–199)
CO2: 28 MMOL/L (ref 21–32)
CREAT SERPL-MCNC: 0.9 MG/DL (ref 0.6–1.1)
ESTIMATED AVERAGE GLUCOSE: 116.9 MG/DL
GFR AFRICAN AMERICAN: >60
GFR NON-AFRICAN AMERICAN: >60
GLOBULIN: 3.3 G/DL
GLUCOSE BLD-MCNC: 100 MG/DL (ref 70–99)
HBA1C MFR BLD: 5.7 %
HDLC SERPL-MCNC: 71 MG/DL (ref 40–60)
LDL CHOLESTEROL CALCULATED: 124 MG/DL
POTASSIUM SERPL-SCNC: 4 MMOL/L (ref 3.5–5.1)
SODIUM BLD-SCNC: 140 MMOL/L (ref 136–145)
TOTAL PROTEIN: 7.6 G/DL (ref 6.4–8.2)
TRIGL SERPL-MCNC: 98 MG/DL (ref 0–150)
VLDLC SERPL CALC-MCNC: 20 MG/DL

## 2020-09-10 PROCEDURE — 77063 BREAST TOMOSYNTHESIS BI: CPT

## 2020-09-15 ENCOUNTER — TELEPHONE (OUTPATIENT)
Dept: INTERNAL MEDICINE CLINIC | Age: 60
End: 2020-09-15

## 2020-09-16 RX ORDER — FLUTICASONE FUROATE AND VILANTEROL TRIFENATATE 100; 25 UG/1; UG/1
1 POWDER RESPIRATORY (INHALATION) DAILY
Qty: 2 EACH | Refills: 0 | COMMUNITY
Start: 2020-09-16 | End: 2021-01-14 | Stop reason: ALTCHOICE

## 2020-09-18 DIAGNOSIS — E05.90 HYPERTHYROIDISM: ICD-10-CM

## 2020-09-18 LAB
T4 FREE: 1.6 NG/DL (ref 0.9–1.8)
TSH SERPL DL<=0.05 MIU/L-ACNC: 0.08 UIU/ML (ref 0.27–4.2)

## 2020-09-21 ENCOUNTER — OFFICE VISIT (OUTPATIENT)
Dept: ENDOCRINOLOGY | Age: 60
End: 2020-09-21
Payer: COMMERCIAL

## 2020-09-21 VITALS
WEIGHT: 225.2 LBS | HEIGHT: 63 IN | BODY MASS INDEX: 39.9 KG/M2 | DIASTOLIC BLOOD PRESSURE: 80 MMHG | SYSTOLIC BLOOD PRESSURE: 130 MMHG

## 2020-09-21 PROCEDURE — 99213 OFFICE O/P EST LOW 20 MIN: CPT | Performed by: INTERNAL MEDICINE

## 2020-09-21 RX ORDER — METHIMAZOLE 5 MG/1
TABLET ORAL
Qty: 45 TABLET | Refills: 3 | Status: SHIPPED | OUTPATIENT
Start: 2020-09-21 | End: 2021-03-31 | Stop reason: SDUPTHER

## 2020-09-21 NOTE — PROGRESS NOTES
Endocrinology  Calvin Taylor M.D. Phone: [phone]   FAX: [phone]       Cathi Bush [address] [place] [address] Place   YOB: 1960    Date of Visit:  9/21/2020    Allergies   Allergen Reactions    Azithromycin Hives     Outpatient Medications Marked as Taking for the 9/21/20 encounter (Office Visit) with Lennox Rubio MD   Medication Sig Dispense Refill    fluticasone-vilanterol (BREO ELLIPTA) 100-25 MCG/INH AEPB inhaler Inhale 1 puff into the lungs daily 2 each 0    hydrOXYzine (ATARAX) 25 MG tablet TAKE 1 TABLET BY MOUTH EVERY SIX HOURS AS NEEDED FOR ITCHING 120 tablet 0    fexofenadine (ALLEGRA) 180 MG tablet Take 1 tablet by mouth daily 30 tablet 5    fluticasone (FLONASE) 50 MCG/ACT nasal spray 2 sprays in each nostril daily. 1 Bottle 11    hydroCHLOROthiazide (HYDRODIURIL) 25 MG tablet TAKE 1 TABLET BY MOUTH ONE TIME A DAY 30 tablet 5    ibuprofen (ADVIL;MOTRIN) 800 MG tablet Take 1 tablet by mouth every 8 hours as needed for Pain 90 tablet 5    Cholecalciferol (VITAMIN D) 50 MCG (2000 UT) CAPS capsule Take 1 capsule by mouth daily 30 capsule 11    Multiple Vitamins-Minerals (MULTIVITAMIN ADULT) TABS Take 1 tablet by mouth daily 30 tablet 11    methIMAzole (TAPAZOLE) 5 MG tablet TAKE ONE TABLET BY MOUTH EVERY OTHER DAY 45 tablet 3    albuterol sulfate  (90 Base) MCG/ACT inhaler Inhale 2 puffs into the lungs every 6 hours as needed for Wheezing or Shortness of Breath 1 Inhaler 6    Semaglutide (RYBELSUS) 7 MG TABS Take 7 mg by mouth daily Patient to bring voucher 30 tablet 11    [facility] 1 each 0    montelukast (SINGULAIR) 10 MG tablet TAKE 1 TABLET BY MOUTH NIGHTLY 90 tablet 3         Vitals:    09/21/20 0940   BP: 130/80   Site: Left Upper Arm   Position: Sitting   Cuff Size: Medium Adult   Weight: 225 lb 3.2 oz (102.2 kg)   Height: 5' 3\" (1.6 m)     Body mass index is 39.89 kg/m².      Wt Readings from Last 3 Encounters:   09/21/20 225 lb 3.2 oz (102.2 kg) 09/09/20 227 lb (103 kg)   05/07/20 221 lb (100.2 kg)     BP Readings from Last 3 Encounters:   09/21/20 130/80   09/09/20 110/66   05/07/20 130/80        Past Medical History:   Diagnosis Date    Chronic back pain     COPD (chronic obstructive pulmonary disease) ([facility] [address]     confirmed with PFT     HTN, goal below 140/90     Hyperthyroidism     Grave's dz    Obesity (BMI 30-39. 9) 8/31/2019    Red eyes     Restrictive lung disease     secondary to weight    Surgical menopause     LMP at age 39     Past Surgical History:   Procedure Laterality Date    HERNIA REPAIR      20 years ago     HYSTERECTOMY      10 years ago; fibroid    SHOULDER SURGERY      right arthroscopy     Family History   Problem Relation Age of Onset   [place] Cancer Mother         unknown    Liver Disease Mother     Kidney Disease Mother     Diabetes Father     Lupus Sister      Social History     Tobacco Use   Smoking Status Never Smoker   Smokeless Tobacco Never Used      Social History     Substance and Sexual Activity   Alcohol Use Yes    Alcohol/week: 2.0 standard drinks    Types: 2 Glasses of wine per week       HPI    Sara Persaud is a 64 y.o. female who is here for a follow-up for management of hyperthyroidism. PCP  Oliva Martell MD       She has a PMH of Asthma, hypertension and chronic back pain. Patient was found to have a low TSH ( <0.01 ) and high FT4 (3.8 ) on labs done on 06/11/14. Repeat labs confirmed hyperthyroidism. Thyroid uptake and scan was consistent with Graves disease. No FH of thyroid disease. She was started on tapazole 10 mg BID. She developed a rash and stopped it for 2-3 days. She restarted it at 10 mg daily. Has to take hydroxyzine with it to control itching. Took  tapazole  5 mg daily since 04/16---> 06/17    Resumed in 2018. Now on Methimazole 5 mg every other day. She is experiencing occasional  increased sweating    No palpitations, tremors.    No diarrhea      Weight has been stable. Review of Systems    Negative for fever, chills, weight loss and diaphoresis. HENT: Negative for neck pain. Eyes: Negative for blurred vision, double vision and photophobia. Respiratory: Negative for cough and hemoptysis. Cardiovascular: Negative for chest pain, palpitations and orthopnea. Genitourinary: Negative for dysuria, urgency, frequency, hematuria and flank pain. Musculoskeletal:  Negative for back pain, joint pain and falls. Skin: Negative for itching and rash. Neurological:Negative for dizziness, tingling, tremors, sensory change, speech change, focal weakness, seizures and loss of consciousness. Endo/Heme/Allergies: Negative for environmental allergies and polydipsia. Does not bruise/bleed easily. Negative for suicidal ideas, hallucinations, memory loss and substance abuse. The patient is not nervous/anxious and does not have insomnia. Physical Exam   Constitutional: She is oriented to person, place, and time. She appears well-developed. No distress. HENT:   Mouth/Throat: Oropharynx is clear and moist.   Eyes: EOM are normal.   Neck: Thyromegaly present. Cardiovascular: Normal rate and normal heart sounds. Pulmonary/Chest: Effort normal. No respiratory distress. She has no wheezes. Abdominal: Soft. Bowel sounds are normal. There is no abdominal tenderness. Musculoskeletal:         General: No edema. Neurological: She is alert and oriented to person, place, and time. Skin: Skin is warm and dry. She is not diaphoretic. Psychiatric: Her behavior is normal. Thought content normal.         Thyroid US 06/14 INDICATION: Hyperthyroidism. COMPARISON: None. FINDINGS: Sonography of the thyroid gland was performed. The entire thyroid gland is enlarged and heterogeneous in echo texture. The right lobe measures 5.6 x 2.1 x 3.1 cm. The left lobe measures 5.8 x 2.2 x 2.8 cm. There is no discrete right thyroid nodules identified.  Within the left thyroid lobe, scattered cysts are noted with no discrete nodule. THYROID UPTAKE SCAN. JUL 02, 2014 08:31:52 AM: INDICATION: \"Hyperthyroidism. \" COMPARISON: Ultrasound thyroid, 6/25/2014. FINDINGS: 24 hours following the administration of the Iodine-123 capsule, the uptake is calculated as 69.9%. The uptake pattern is symmetrical. The thyroid gland is globally enlarged, consistent with the ultrasound. Orders Only on 09/18/2020   Component Date Value Ref Range Status    T4 Free 09/18/2020 1.6  0.9 - 1.8 ng/dL Final    TSH 09/18/2020 0.08* 0.27 - 4.20 uIU/mL Final   Orders Only on 09/10/2020   Component Date Value Ref Range Status    Hemoglobin A1C 09/10/2020 5.7  See comment % Final    Comment: Comment:  Diagnosis of Diabetes: > or = 6.5%  Increased risk of diabetes (Prediabetes): 5.7-6.4%  Glycemic Control: Nonpregnant Adults: <7.0%                    Pregnant: <6.0%        eAG 09/10/2020 116.9  mg/dL Final    Cholesterol, Total 09/10/2020 215* 0 - 199 mg/dL Final    Triglycerides 09/10/2020 98  0 - 150 mg/dL Final    HDL 09/10/2020 71* 40 - 60 mg/dL Final    LDL Calculated 09/10/2020 124* <100 mg/dL Final    VLDL Cholesterol Calculated 09/10/2020 20  Not Established mg/dL Final    Sodium 09/10/2020 140  136 - 145 mmol/L Final    Potassium 09/10/2020 4.0  3.5 - 5.1 mmol/L Final    Chloride 09/10/2020 101  99 - 110 mmol/L Final    CO2 09/10/2020 28  21 - 32 mmol/L Final    Anion Gap 09/10/2020 11  3 - 16 Final    Glucose 09/10/2020 100* 70 - 99 mg/dL Final    BUN 09/10/2020 16  7 - 20 mg/dL Final    CREATININE 09/10/2020 0.9  0.6 - 1.1 mg/dL Final    GFR Non- 09/10/2020 >60  >60 Final    Comment: >60 mL/min/1.73m2 EGFR, calc. for ages 25 and older using the  MDRD formula (not corrected for weight), is valid for stable  renal function.  GFR  09/10/2020 >60  >60 Final    Comment: Chronic Kidney Disease: less than 60 ml/min/1.73 sq.m.           Kidney Failure: less than 15 ml/min/1.73 sq.m. Results valid for patients 18 years and older.  Calcium 09/10/2020 9.6  8.3 - 10.6 mg/dL Final    Total Protein 09/10/2020 7.6  6.4 - 8.2 g/dL Final    Alb 09/10/2020 4.3  3.4 - 5.0 g/dL Final    Albumin/Globulin Ratio 09/10/2020 1.3  1.1 - 2.2 Final    Total Bilirubin 09/10/2020 0.4  0.0 - 1.0 mg/dL Final    Alkaline Phosphatase 09/10/2020 78  40 - 129 U/L Final    ALT 09/10/2020 14  10 - 40 U/L Final    AST 09/10/2020 16  15 - 37 U/L Final    Globulin 09/10/2020 3.3  g/dL Final           Assessment/Plan    1. Hyperthyroidism    This 64 yrs old female was found to have a low TSH and high Free T4. Repeat labs confirmed hyperthyroidism with positive Thyrotropin receptor antibodies. Thyroid uptake scan also showed diffuse uptake of 69.9 % consistent with Graves disease. Was on tapazole since 06/14--2017    Resumed in 2018. On methimazole 5 mg every other day     Discussed option of continuing anti-thyroid medication, MCKENZIE ablation and surgery. Saw ophthalmology. Has mild exophthalmos. Cleared for MCKENZIE as per ophthalmology. TSH 0.13---> 0.04---> 0.56---> 0.08  Free T4 of 1.4--->  1.7---> 1.3---> 1.6    She stopped her medication 1 week before labs. Continue methimazole 5 mg every other day ( Monday -Wednesday-Friday)  Repeat labs and follow-up in 6 months. 2. Obesity. Tried contrave and sexadna. Using oral Semaglutide as per PCP.      3. HTN managed by PCP

## 2020-10-05 ENCOUNTER — TELEPHONE (OUTPATIENT)
Dept: INTERNAL MEDICINE CLINIC | Age: 60
End: 2020-10-05

## 2020-10-07 RX ORDER — ORAL SEMAGLUTIDE 3 MG/1
1 TABLET ORAL DAILY
Qty: 30 TABLET | Refills: 0 | COMMUNITY
Start: 2020-10-07 | End: 2020-11-10 | Stop reason: SDUPTHER

## 2020-10-07 RX ORDER — FLUTICASONE FUROATE AND VILANTEROL TRIFENATATE 100; 25 UG/1; UG/1
1 POWDER RESPIRATORY (INHALATION) DAILY
Qty: 1 EACH | Refills: 0 | COMMUNITY
Start: 2020-10-07 | End: 2020-10-27 | Stop reason: SDUPTHER

## 2020-10-09 DIAGNOSIS — Z12.11 COLON CANCER SCREENING: Primary | ICD-10-CM

## 2020-10-26 ENCOUNTER — TELEPHONE (OUTPATIENT)
Dept: INTERNAL MEDICINE CLINIC | Age: 60
End: 2020-10-26

## 2020-10-27 RX ORDER — FLUTICASONE FUROATE AND VILANTEROL TRIFENATATE 100; 25 UG/1; UG/1
1 POWDER RESPIRATORY (INHALATION) DAILY
Qty: 2 EACH | Refills: 0 | COMMUNITY
Start: 2020-10-27 | End: 2020-12-10 | Stop reason: SDUPTHER

## 2020-11-10 RX ORDER — ORAL SEMAGLUTIDE 3 MG/1
1 TABLET ORAL DAILY
Qty: 30 TABLET | Refills: 0 | COMMUNITY
Start: 2020-11-10 | End: 2021-01-08

## 2020-12-09 ENCOUNTER — TELEPHONE (OUTPATIENT)
Dept: INTERNAL MEDICINE CLINIC | Age: 60
End: 2020-12-09

## 2020-12-09 RX ORDER — MONTELUKAST SODIUM 10 MG/1
TABLET ORAL
Qty: 90 TABLET | Refills: 3 | Status: SHIPPED | OUTPATIENT
Start: 2020-12-09 | End: 2022-05-18

## 2020-12-10 RX ORDER — FLUTICASONE FUROATE AND VILANTEROL TRIFENATATE 100; 25 UG/1; UG/1
1 POWDER RESPIRATORY (INHALATION) DAILY
Qty: 2 EACH | Refills: 0 | COMMUNITY
Start: 2020-12-10 | End: 2021-01-14 | Stop reason: ALTCHOICE

## 2021-01-08 ENCOUNTER — OFFICE VISIT (OUTPATIENT)
Dept: INTERNAL MEDICINE CLINIC | Age: 61
End: 2021-01-08
Payer: COMMERCIAL

## 2021-01-08 VITALS
TEMPERATURE: 97.4 F | HEART RATE: 66 BPM | WEIGHT: 223 LBS | OXYGEN SATURATION: 98 % | SYSTOLIC BLOOD PRESSURE: 120 MMHG | DIASTOLIC BLOOD PRESSURE: 74 MMHG | BODY MASS INDEX: 39.5 KG/M2

## 2021-01-08 DIAGNOSIS — M51.36 DDD (DEGENERATIVE DISC DISEASE), LUMBAR: ICD-10-CM

## 2021-01-08 DIAGNOSIS — I10 ESSENTIAL HYPERTENSION: ICD-10-CM

## 2021-01-08 DIAGNOSIS — J45.40 MODERATE PERSISTENT ASTHMA WITHOUT COMPLICATION: Primary | ICD-10-CM

## 2021-01-08 PROCEDURE — 99214 OFFICE O/P EST MOD 30 MIN: CPT | Performed by: INTERNAL MEDICINE

## 2021-01-08 RX ORDER — ORAL SEMAGLUTIDE 14 MG/1
1 TABLET ORAL DAILY
Qty: 30 TABLET | Refills: 11 | Status: SHIPPED | OUTPATIENT
Start: 2021-01-08 | End: 2021-09-10 | Stop reason: ALTCHOICE

## 2021-01-08 RX ORDER — HYDROCHLOROTHIAZIDE 25 MG/1
TABLET ORAL
Qty: 90 TABLET | Refills: 3 | Status: SHIPPED | OUTPATIENT
Start: 2021-01-08 | End: 2021-05-14

## 2021-01-08 ASSESSMENT — PATIENT HEALTH QUESTIONNAIRE - PHQ9
1. LITTLE INTEREST OR PLEASURE IN DOING THINGS: 0
SUM OF ALL RESPONSES TO PHQ9 QUESTIONS 1 & 2: 0
SUM OF ALL RESPONSES TO PHQ QUESTIONS 1-9: 0
SUM OF ALL RESPONSES TO PHQ QUESTIONS 1-9: 0

## 2021-01-08 ASSESSMENT — ASTHMA QUESTIONNAIRES
QUESTION_5 LAST FOUR WEEKS HOW WOULD YOU RATE YOUR ASTHMA CONTROL: 3
QUESTION_2 LAST FOUR WEEKS HOW OFTEN HAVE YOU HAD SHORTNESS OF BREATH: 3

## 2021-01-08 ASSESSMENT — ENCOUNTER SYMPTOMS
EYE PAIN: 0
CHOKING: 0
COUGH: 0
EYE REDNESS: 0

## 2021-01-08 NOTE — PROGRESS NOTES
Melody Winter (:  1960) is a 64 y.o. female,Established patient, here for evaluation of the following chief complaint(s):  Asthma      ASSESSMENT/PLAN:  1. Moderate persistent asthma without complication  -  Not at goal based on the ACT  -  Instruction on use of inhaler    2. Essential hypertension  -  stable  -     hydroCHLOROthiazide (HYDRODIURIL) 25 MG tablet; TAKE 1 TABLET BY MOUTH ONE TIME A DAY, Disp-90 tablet, R-3Normal    3. DDD (degenerative disc disease), lumbar  improved    Return in about 5 months (around 2021) for Annual Physical.    SUBJECTIVE/OBJECTIVE:  HPI   Asthma:  Current treatment includes combination beta agonists/steroid inhalers. Using preventive medication(s) consistently: yes. Residual symptoms: none. Patient denies any other symptoms. She requires her rescue inhaler 1 time(s) per month. Hypertension:  Home blood pressure monitoring: No.  She is adherent to a low sodium diet. Patient denies chest pain, shortness of breath, headache and lightheadedness. Antihypertensive medication side effects: no medication side effects noted. Use of agents associated with hypertension: none. Sodium (mmol/L)   Date Value   09/10/2020 140    BUN (mg/dL)   Date Value   09/10/2020 16    Glucose (mg/dL)   Date Value   09/10/2020 100 (H)      Potassium (mmol/L)   Date Value   09/10/2020 4.0    CREATININE (mg/dL)   Date Value   09/10/2020 0.9           Review of Systems   Constitutional: Negative for diaphoresis and fatigue. HENT: Negative for drooling and ear discharge. Eyes: Negative for pain and redness. Respiratory: Negative for cough and choking. Cardiovascular: Negative for chest pain and leg swelling.        Vitals:    21 1440   BP: 120/74   Site: Left Upper Arm   Position: Sitting   Cuff Size: Large Adult   Pulse: 66   Temp: 97.4 °F (36.3 °C)   TempSrc: Infrared   SpO2: 98%   Weight: 223 lb (101.2 kg)      Wt Readings from Last 3 Encounters:   01/08/21 223 lb (101.2 kg)   09/21/20 225 lb 3.2 oz (102.2 kg)   09/09/20 227 lb (103 kg)     BP Readings from Last 3 Encounters:   01/08/21 120/74   09/21/20 130/80   09/09/20 110/66     Body mass index is 39.5 kg/m². Facility age limit for growth percentiles is 20 years. Physical Exam  Constitutional:       General: She is not in acute distress. Appearance: Normal appearance. She is not ill-appearing. HENT:      Head: Normocephalic and atraumatic. Right Ear: Tympanic membrane and ear canal normal.      Left Ear: Tympanic membrane and ear canal normal.      Nose: Nose normal. No congestion or rhinorrhea. Mouth/Throat:      Mouth: Mucous membranes are moist.      Pharynx: No oropharyngeal exudate or posterior oropharyngeal erythema. Eyes:      General:         Right eye: No discharge. Left eye: No discharge. Pupils: Pupils are equal, round, and reactive to light. Neck:      Musculoskeletal: Normal range of motion. No neck rigidity or muscular tenderness. Cardiovascular:      Rate and Rhythm: Normal rate and regular rhythm. Pulmonary:      Effort: Pulmonary effort is normal. No respiratory distress. Breath sounds: No stridor. No wheezing. Neurological:      Mental Status: She is alert. ASTHMA CONTROL TEST 1/8/2021 5/7/2020   In the past 4 weeks, how much of the time did your asthma keep you from getting as much done at work, school or at home? 3 4   During the past 4 weeks, how often have you had shortness of breath? 3 4   During the past 4 weeks, how often did your asthma symptoms (wheezing, coughing, shortness of breath, chest tightness or pain) wake you up at night or earlier than usual in the morning? 4 3   During the past 4 weeks, how often have you used your rescue inhaler or nebulizer medication (such as albuterol)? 4 4   How would you rate your asthma control during the past 4 weeks?  3 4   Asthma Control Test Total Score 17 19 An electronic signature was used to authenticate this note.     --Estevan Hayes MD

## 2021-01-11 RX ORDER — MONTELUKAST SODIUM 10 MG/1
TABLET ORAL
Qty: 90 TABLET | Refills: 3 | Status: SHIPPED | OUTPATIENT
Start: 2021-01-11 | End: 2022-05-10 | Stop reason: SDUPTHER

## 2021-01-13 ENCOUNTER — TELEPHONE (OUTPATIENT)
Dept: INTERNAL MEDICINE CLINIC | Age: 61
End: 2021-01-13

## 2021-02-08 ENCOUNTER — TELEPHONE (OUTPATIENT)
Dept: INTERNAL MEDICINE CLINIC | Age: 61
End: 2021-02-08

## 2021-02-08 NOTE — TELEPHONE ENCOUNTER
Pt states that the Breo 200 mcg doesn't make her feel right. Pt would like to go back on the 100 mcg. Please advise.  Pt would also like a sample of the Breo 100 mcg

## 2021-02-09 RX ORDER — FLUTICASONE FUROATE AND VILANTEROL TRIFENATATE 100; 25 UG/1; UG/1
1 POWDER RESPIRATORY (INHALATION) DAILY
Qty: 60 EACH | Refills: 5 | Status: SHIPPED | OUTPATIENT
Start: 2021-02-09 | End: 2021-09-10 | Stop reason: SINTOL

## 2021-02-09 RX ORDER — FLUTICASONE FUROATE AND VILANTEROL TRIFENATATE 100; 25 UG/1; UG/1
1 POWDER RESPIRATORY (INHALATION) DAILY
Qty: 2 EACH | Refills: 0 | COMMUNITY
Start: 2021-02-09 | End: 2021-03-11 | Stop reason: SDUPTHER

## 2021-03-11 ENCOUNTER — TELEPHONE (OUTPATIENT)
Dept: INTERNAL MEDICINE CLINIC | Age: 61
End: 2021-03-11

## 2021-03-11 RX ORDER — FLUTICASONE FUROATE AND VILANTEROL TRIFENATATE 100; 25 UG/1; UG/1
1 POWDER RESPIRATORY (INHALATION) DAILY
Qty: 1 EACH | Refills: 0 | COMMUNITY
Start: 2021-03-11 | End: 2021-09-10 | Stop reason: ALTCHOICE

## 2021-03-25 ENCOUNTER — TELEPHONE (OUTPATIENT)
Dept: INTERNAL MEDICINE CLINIC | Age: 61
End: 2021-03-25

## 2021-03-25 NOTE — TELEPHONE ENCOUNTER
Please Advise    Pt complaints of still having a lot of drainage   Pt would like to try something different then momtelukast

## 2021-03-26 RX ORDER — CETIRIZINE HYDROCHLORIDE 10 MG/1
10 TABLET ORAL DAILY
Qty: 30 TABLET | Refills: 3 | Status: SHIPPED | OUTPATIENT
Start: 2021-03-26 | End: 2021-08-30

## 2021-03-31 DIAGNOSIS — E05.90 HYPERTHYROIDISM: ICD-10-CM

## 2021-03-31 RX ORDER — METHIMAZOLE 5 MG/1
TABLET ORAL
Qty: 45 TABLET | Refills: 3 | Status: SHIPPED | OUTPATIENT
Start: 2021-03-31 | End: 2021-07-06 | Stop reason: SDUPTHER

## 2021-05-11 ENCOUNTER — TELEPHONE (OUTPATIENT)
Dept: INTERNAL MEDICINE CLINIC | Age: 61
End: 2021-05-11

## 2021-05-11 DIAGNOSIS — J44.9 CHRONIC OBSTRUCTIVE PULMONARY DISEASE, UNSPECIFIED COPD TYPE (HCC): Primary | Chronic | ICD-10-CM

## 2021-05-11 RX ORDER — FLUTICASONE FUROATE, UMECLIDINIUM BROMIDE AND VILANTEROL TRIFENATATE 100; 62.5; 25 UG/1; UG/1; UG/1
1 POWDER RESPIRATORY (INHALATION) DAILY
Qty: 2 EACH | Refills: 0 | COMMUNITY
Start: 2021-05-11 | End: 2021-09-10 | Stop reason: ALTCHOICE

## 2021-05-12 RX ORDER — FLUTICASONE FUROATE, UMECLIDINIUM BROMIDE AND VILANTEROL TRIFENATATE 100; 62.5; 25 UG/1; UG/1; UG/1
1 POWDER RESPIRATORY (INHALATION) DAILY
COMMUNITY
End: 2021-09-10 | Stop reason: SINTOL

## 2021-07-06 DIAGNOSIS — E05.90 HYPERTHYROIDISM: ICD-10-CM

## 2021-07-06 RX ORDER — METHIMAZOLE 5 MG/1
TABLET ORAL
Qty: 45 TABLET | Refills: 3 | Status: SHIPPED | OUTPATIENT
Start: 2021-07-06 | End: 2021-08-16 | Stop reason: SDUPTHER

## 2021-07-06 NOTE — TELEPHONE ENCOUNTER
Medication:   Requested Prescriptions     Pending Prescriptions Disp Refills    methIMAzole (TAPAZOLE) 5 MG tablet 45 tablet 3     Sig: TAKE ONE TABLET BY MOUTH EVERY OTHER DAY         Last appt: 9/21/2020   Next appt: 8/16/2021    Last Thyroid:   Lab Results   Component Value Date    TSH 0.08 09/18/2020    FT3 2.4 01/13/2015    F2BLESF 1.39 05/29/2018    T4FREE 1.6 09/18/2020

## 2021-07-21 DIAGNOSIS — M51.36 DDD (DEGENERATIVE DISC DISEASE), LUMBAR: ICD-10-CM

## 2021-07-25 RX ORDER — IBUPROFEN 800 MG/1
800 TABLET ORAL EVERY 8 HOURS PRN
Qty: 90 TABLET | Refills: 5 | Status: SHIPPED | OUTPATIENT
Start: 2021-07-25

## 2021-08-16 ENCOUNTER — OFFICE VISIT (OUTPATIENT)
Dept: ENDOCRINOLOGY | Age: 61
End: 2021-08-16
Payer: COMMERCIAL

## 2021-08-16 VITALS
BODY MASS INDEX: 41.21 KG/M2 | WEIGHT: 232.6 LBS | HEART RATE: 76 BPM | HEIGHT: 63 IN | OXYGEN SATURATION: 93 % | DIASTOLIC BLOOD PRESSURE: 86 MMHG | SYSTOLIC BLOOD PRESSURE: 150 MMHG

## 2021-08-16 DIAGNOSIS — E05.90 HYPERTHYROIDISM: ICD-10-CM

## 2021-08-16 PROCEDURE — 99214 OFFICE O/P EST MOD 30 MIN: CPT | Performed by: INTERNAL MEDICINE

## 2021-08-16 RX ORDER — METHIMAZOLE 5 MG/1
TABLET ORAL
Qty: 45 TABLET | Refills: 3 | Status: SHIPPED | OUTPATIENT
Start: 2021-08-16 | End: 2022-01-21 | Stop reason: SDUPTHER

## 2021-08-16 NOTE — PROGRESS NOTES
HPI    Cindy Hanson is a 64 y.o. female who is here for a follow-up for management of hyperthyroidism. PCP  Beni Devi MD     Has seen Dr. Belia Root    She has a PMH of Asthma, hypertension and chronic back pain. Patient was found to have a low TSH ( <0.01 ) and high FT4 (3.8 ) on labs done on 06/11/14. Repeat labs confirmed hyperthyroidism. Thyroid uptake and scan was consistent with Graves disease. No FH of thyroid disease. She was started on tapazole 10 mg BID. She developed a rash and stopped it for 2-3 days. She restarted it at 10 mg daily. Has to take hydroxyzine with it to control itching. Took  tapazole  5 mg daily since 04/16---> 06/17    Resumed in 2018. Now on Methimazole 5 mg every other day. She is experiencing occasional  increased sweating    No palpitations, tremors. No diarrhea      Weight has been stable. Thyroid US 06/14 INDICATION: Hyperthyroidism. COMPARISON: None. FINDINGS: Sonography of the thyroid gland was performed. The entire thyroid gland is enlarged and heterogeneous in echo texture. The right lobe measures 5.6 x 2.1 x 3.1 cm. The left lobe measures 5.8 x 2.2 x 2.8 cm. There is no discrete right thyroid nodules identified. Within the left thyroid lobe, scattered cysts are noted with no discrete nodule. THYROID UPTAKE SCAN. JUL 02, 2014 08:31:52 AM: INDICATION: \"Hyperthyroidism. \" COMPARISON: Ultrasound thyroid, 6/25/2014. FINDINGS: 24 hours following the administration of the Iodine-123 capsule, the uptake is calculated as 69.9%. The uptake pattern is symmetrical. The thyroid gland is globally enlarged, consistent with the ultrasound. Worked  at our office, currently working at home    No visits with results within 2 Month(s) from this visit.    Latest known visit with results is:   Orders Only on 09/18/2020   Component Date Value Ref Range Status    T4 Free 09/18/2020 1.6  0.9 - 1.8 ng/dL Final    TSH 09/18/2020 0.08* 0.27 - 4.20 uIU/mL Final      ROS: Scanned, reviewed    Past Medical History:   Diagnosis Date    Chronic back pain     COPD (chronic obstructive pulmonary disease) ([address]     confirmed with PFT     HTN, goal below 140/90     Hyperthyroidism     Grave's dz    Obesity (BMI 30-39. 9) 8/31/2019    Red eyes     Restrictive lung disease     secondary to weight    Surgical menopause     LMP at age 39     Past Surgical History:   Procedure Laterality Date    HERNIA REPAIR      20 years ago     HYSTERECTOMY      10 years ago; fibroid    SHOULDER SURGERY      right arthroscopy     Current Outpatient Medications   Medication Sig Dispense Refill    amoxicillin-clavulanate (AUGMENTIN) 875-125 MG per tablet Take 1 tablet by mouth 2 times daily for 10 days 20 tablet 0    ibuprofen (ADVIL;MOTRIN) 800 MG tablet Take 1 tablet by mouth every 8 hours as needed for Pain 90 tablet 5    methIMAzole (TAPAZOLE) 5 MG tablet TAKE ONE TABLET BY MOUTH EVERY OTHER DAY 45 tablet 3    hydroCHLOROthiazide (HYDRODIURIL) 25 MG tablet TAKE 1 TABLET BY MOUTH ONE TIME A DAY 30 tablet 5    fluticasone-umeclidin-vilant (TRELEGY ELLIPTA) 100-62.5-25 MCG/INH AEPB Inhale 1 puff into the lungs daily Indications: samples 2 box's 1 in each box      fluticasone-umeclidin-vilant (TRELEGY ELLIPTA) 100-62.5-25 MCG/INH AEPB Inhale 1 puff into the lungs daily 2 each 0    fluticasone-vilanterol (BREO ELLIPTA) 100-25 MCG/INH AEPB inhaler Inhale 1 puff into the lungs daily 1 each 0    fluticasone-vilanterol (BREO ELLIPTA) 100-25 MCG/INH AEPB inhaler Inhale 1 puff into the lungs daily 60 each 5    montelukast (SINGULAIR) 10 MG tablet TAKE 1 TABLET BY MOUTH NIGHTLY 90 tablet 3    Semaglutide (RYBELSUS) 14 MG TABS Take 1 tablet by mouth daily 30 tablet 11    montelukast (SINGULAIR) 10 MG tablet TAKE 1 TABLET BY MOUTH NIGHTLY 90 tablet 3    fluticasone (FLONASE) 50 MCG/ACT nasal spray 2 sprays in each nostril daily.  1 Bottle 11    Cholecalciferol follow-up in 6 months. 2. Obesity. Tried contrave and sexadna. Using oral Semaglutide as per PCP.      3. HTN managed by PCP

## 2021-08-27 NOTE — TELEPHONE ENCOUNTER
Patient is requesting a refill of their prescription.     Requested Prescriptions     Pending Prescriptions Disp Refills    cetirizine (ZYRTEC) 10 MG tablet [Pharmacy Med Name: CETIRIZINE 10MG TABLETS] 30 tablet 3     Sig: TAKE 1 TABLET BY MOUTH DAILY        Recent Visits  Date Type Provider Dept   01/08/21 Office Visit Jigna Garcia MD [facility]   09/09/20 Office Visit Jigna Garcia MD [facility]   05/07/20 Office Visit Jigna Garcia MD [facility]   Showing recent visits within past 540 days with a meds authorizing provider and meeting all other requirements  Future Appointments  Date Type Provider Dept   09/10/21 Appointment Jigna Garcia MD [facility]   Showing future appointments within next 150 days with a meds authorizing provider and meeting all other requirements     1/8/2021

## 2021-08-30 RX ORDER — CETIRIZINE HYDROCHLORIDE 10 MG/1
10 TABLET ORAL DAILY
Qty: 30 TABLET | Refills: 3 | Status: SHIPPED | OUTPATIENT
Start: 2021-08-30 | End: 2021-09-10 | Stop reason: SDUPTHER

## 2021-09-10 ENCOUNTER — OFFICE VISIT (OUTPATIENT)
Dept: INTERNAL MEDICINE CLINIC | Age: 61
End: 2021-09-10
Payer: COMMERCIAL

## 2021-09-10 VITALS
WEIGHT: 229.8 LBS | HEART RATE: 71 BPM | OXYGEN SATURATION: 98 % | SYSTOLIC BLOOD PRESSURE: 126 MMHG | BODY MASS INDEX: 40.72 KG/M2 | DIASTOLIC BLOOD PRESSURE: 76 MMHG | TEMPERATURE: 97.4 F | HEIGHT: 63 IN

## 2021-09-10 DIAGNOSIS — Z00.00 WELL ADULT EXAM: Primary | ICD-10-CM

## 2021-09-10 DIAGNOSIS — Z12.11 COLON CANCER SCREENING: ICD-10-CM

## 2021-09-10 DIAGNOSIS — Z12.31 ENCOUNTER FOR SCREENING MAMMOGRAM FOR MALIGNANT NEOPLASM OF BREAST: ICD-10-CM

## 2021-09-10 DIAGNOSIS — I10 ESSENTIAL HYPERTENSION: ICD-10-CM

## 2021-09-10 DIAGNOSIS — R73.03 PREDIABETES: ICD-10-CM

## 2021-09-10 DIAGNOSIS — J44.9 CHRONIC OBSTRUCTIVE PULMONARY DISEASE, UNSPECIFIED COPD TYPE (HCC): ICD-10-CM

## 2021-09-10 PROCEDURE — 99396 PREV VISIT EST AGE 40-64: CPT | Performed by: INTERNAL MEDICINE

## 2021-09-10 RX ORDER — BUDESONIDE, GLYCOPYRROLATE, AND FORMOTEROL FUMARATE 160; 9; 4.8 UG/1; UG/1; UG/1
2 AEROSOL, METERED RESPIRATORY (INHALATION) 2 TIMES DAILY
Qty: 10.7 G | Refills: 11 | Status: SHIPPED | OUTPATIENT
Start: 2021-09-10 | End: 2021-12-15 | Stop reason: SINTOL

## 2021-09-10 RX ORDER — ORAL SEMAGLUTIDE 7 MG/1
1 TABLET ORAL DAILY
Qty: 30 TABLET | Refills: 11 | Status: SHIPPED | OUTPATIENT
Start: 2021-09-10

## 2021-09-10 RX ORDER — CETIRIZINE HYDROCHLORIDE 10 MG/1
10 TABLET ORAL DAILY
Qty: 30 TABLET | Refills: 3 | Status: SHIPPED | OUTPATIENT
Start: 2021-09-10 | End: 2021-11-11 | Stop reason: SDUPTHER

## 2021-09-10 RX ORDER — BUDESONIDE, GLYCOPYRROLATE, AND FORMOTEROL FUMARATE 160; 9; 4.8 UG/1; UG/1; UG/1
2 AEROSOL, METERED RESPIRATORY (INHALATION) 2 TIMES DAILY
Qty: 10.7 G | Refills: 11 | Status: SHIPPED | OUTPATIENT
Start: 2021-09-10 | End: 2021-09-10 | Stop reason: SDUPTHER

## 2021-09-10 RX ORDER — ALBUTEROL SULFATE 90 UG/1
2 AEROSOL, METERED RESPIRATORY (INHALATION) EVERY 6 HOURS PRN
Qty: 1 EACH | Refills: 3 | Status: SHIPPED | OUTPATIENT
Start: 2021-09-10 | End: 2021-09-10 | Stop reason: SDUPTHER

## 2021-09-10 RX ORDER — CETIRIZINE HYDROCHLORIDE 10 MG/1
10 TABLET ORAL DAILY
Qty: 30 TABLET | Refills: 3 | Status: SHIPPED | OUTPATIENT
Start: 2021-09-10 | End: 2021-09-10 | Stop reason: SDUPTHER

## 2021-09-10 RX ORDER — MULTIVIT-MIN/IRON/FOLIC ACID/K 18-600-40
1 CAPSULE ORAL DAILY
Qty: 30 CAPSULE | Refills: 11 | Status: SHIPPED | OUTPATIENT
Start: 2021-09-10 | End: 2021-09-10 | Stop reason: SDUPTHER

## 2021-09-10 RX ORDER — MULTIVIT-MIN/IRON/FOLIC ACID/K 18-600-40
1 CAPSULE ORAL DAILY
Qty: 30 CAPSULE | Refills: 11 | Status: SHIPPED | OUTPATIENT
Start: 2021-09-10

## 2021-09-10 RX ORDER — ALBUTEROL SULFATE 90 UG/1
2 AEROSOL, METERED RESPIRATORY (INHALATION) EVERY 6 HOURS PRN
Qty: 1 EACH | Refills: 3 | Status: SHIPPED | OUTPATIENT
Start: 2021-09-10

## 2021-09-10 SDOH — ECONOMIC STABILITY: FOOD INSECURITY: WITHIN THE PAST 12 MONTHS, YOU WORRIED THAT YOUR FOOD WOULD RUN OUT BEFORE YOU GOT MONEY TO BUY MORE.: NEVER TRUE

## 2021-09-10 SDOH — ECONOMIC STABILITY: FOOD INSECURITY: WITHIN THE PAST 12 MONTHS, THE FOOD YOU BOUGHT JUST DIDN'T LAST AND YOU DIDN'T HAVE MONEY TO GET MORE.: NEVER TRUE

## 2021-09-10 ASSESSMENT — PATIENT HEALTH QUESTIONNAIRE - PHQ9
1. LITTLE INTEREST OR PLEASURE IN DOING THINGS: 0
SUM OF ALL RESPONSES TO PHQ9 QUESTIONS 1 & 2: 0
SUM OF ALL RESPONSES TO PHQ QUESTIONS 1-9: 0
2. FEELING DOWN, DEPRESSED OR HOPELESS: 0
SUM OF ALL RESPONSES TO PHQ QUESTIONS 1-9: 0
SUM OF ALL RESPONSES TO PHQ QUESTIONS 1-9: 0

## 2021-09-10 ASSESSMENT — ENCOUNTER SYMPTOMS
EYE PAIN: 0
COUGH: 0
CHOKING: 0
EYE REDNESS: 0

## 2021-09-10 ASSESSMENT — SOCIAL DETERMINANTS OF HEALTH (SDOH): HOW HARD IS IT FOR YOU TO PAY FOR THE VERY BASICS LIKE FOOD, HOUSING, MEDICAL CARE, AND HEATING?: NOT HARD AT ALL

## 2021-09-10 NOTE — PROGRESS NOTES
9/10/2021    Candice Narayan (:  1960) is a 64 y.o. female, here for a preventive medicine evaluation. Patient is   Using albuterol regularly. She cannot tolerate the trelegy. She would like to switch to breztri. Patient Active Problem List   Diagnosis    Hyperthyroidism    Graves' disease    Asthma    Restrictive lung disease    COPD (chronic obstructive pulmonary disease) ([facility] [address]    Seasonal allergic rhinitis due to pollen    PND (post-nasal drip)    Phlegm in throat    Sleep apnea in adult    Non morbid obesity, unspecified obesity type    Obesity (BMI 30-39. 9)    Episcleritis of right eye       Review of Systems   Constitutional: Positive for unexpected weight change. HENT: Negative for nosebleeds and postnasal drip. Eyes: Negative for pain and redness. Respiratory: Negative for cough and choking. Cardiovascular: Negative for chest pain and leg swelling. Prior to Visit Medications    Medication Sig Taking?  Authorizing Provider   Semaglutide (RYBELSUS) 7 MG TABS Take 1 tablet by mouth daily Yes Samson Turner MD   cetirizine (ZYRTEC) 10 MG tablet Take 1 tablet by mouth daily Yes Samson Turner MD   Cholecalciferol (VITAMIN D) 50 MCG (2000 UT) CAPS capsule Take 1 capsule by mouth daily Yes Samson Turner MD   albuterol sulfate  (90 Base) MCG/ACT inhaler Inhale 2 puffs into the lungs every 6 hours as needed for Wheezing or Shortness of Breath Yes Samson Turner MD   Budeson-Glycopyrrol-Formoterol (BREZTRI AEROSPHERE) 160-9-4.8 MCG/ACT AERO Inhale 2 puffs into the lungs 2 times daily Yes Samson Turner MD   methIMAzole (TAPAZOLE) 5 MG tablet TAKE ONE TABLET BY MOUTH EVERY OTHER DAY Yes Paco Aguilar MD   ibuprofen (ADVIL;MOTRIN) 800 MG tablet Take 1 tablet by mouth every 8 hours as needed for Pain Yes Samson Turner MD   hydroCHLOROthiazide (HYDRODIURIL) 25 MG tablet TAKE 1 TABLET BY MOUTH ONE TIME A DAY Yes Violetta Londono Elisabeth Gan MD   fluticasone [facility]) 50 MCG/ACT nasal spray 2 sprays in each nostril daily. Yes Trav Simental MD   Multiple Vitamins-Minerals (MULTIVITAMIN ADULT) TABS Take 1 tablet by mouth daily Yes Trav Simental MD   Handicap Placard MISC One year Yes Erika Side, APRN - CNP   montelukast (SINGULAIR) 10 MG tablet TAKE 1 TABLET BY MOUTH NIGHTLY  Patient not taking: Reported on 9/10/2021  Trav Simental MD   montelukast (SINGULAIR) 10 MG tablet TAKE 1 TABLET BY MOUTH NIGHTLY  Patient not taking: Reported on 9/10/2021  Trav Simental MD        Allergies   Allergen Reactions    Azithromycin Hives       Past Medical History:   Diagnosis Date    Chronic back pain     COPD (chronic obstructive pulmonary disease) ([facility] [address]     confirmed with PFT     HTN, goal below 140/90     Hyperthyroidism     Grave's dz    Obesity (BMI 30-39. 9) 8/31/2019    Red eyes     Restrictive lung disease     secondary to weight    Surgical menopause     LMP at age 39       Past Surgical History:   Procedure Laterality Date    HERNIA REPAIR      20 years ago     HYSTERECTOMY      10 years ago; fibroid    SHOULDER SURGERY      right arthroscopy       Social History     Socioeconomic History    Marital status: Single     Spouse name: Not on file    Number of children: Not on file    Years of education: Not on file    Highest education level: Not on file   Occupational History    Occupation: inventory       Comment: [employer]    Tobacco Use    Smoking status: Never Smoker    Smokeless tobacco: Never Used   Vaping Use    Vaping Use: Never used   Substance and Sexual Activity    Alcohol use:  Yes     Alcohol/week: 2.0 standard drinks     Types: 2 Glasses of wine per week    Drug use: No    Sexual activity: Yes     Partners: Male     Birth control/protection: Surgical   Other Topics Concern    Not on file   Social History Narrative    Not on file     Social Determinants of Health     Financial Resource Strain: Low Risk     Difficulty of Paying Living Expenses: Not hard at all   Food Insecurity: No Food Insecurity    Worried About Running Out of Food in the Last Year: Never true    Cuca of Food in the Last Year: Never true   Transportation Needs:     Lack of Transportation (Medical):  Lack of Transportation (Non-Medical):    Physical Activity:     Days of Exercise per Week:     Minutes of Exercise per Session:    Stress:     Feeling of Stress :    Social Connections:     Frequency of Communication with Friends and Family:     Frequency of Social Gatherings with Friends and Family:     Attends Worship Services:     Active Member of Clubs or Organizations:     Attends Club or Organization Meetings:     Marital Status:    Intimate Partner Violence:     Fear of Current or Ex-Partner:     Emotionally Abused:     Physically Abused:     Sexually Abused:         Family History   Problem Relation Age of Onset    Cancer Mother         unknown    Liver Disease Mother     Kidney Disease Mother     Diabetes Father     Lupus Sister        ADVANCE DIRECTIVE: N, <no information>  Vitals:    09/10/21 0830   BP: 126/76   Site: Right Upper Arm   Position: Sitting   Cuff Size: Large Adult   Pulse: 71   Temp: 97.4 °F (36.3 °C)   TempSrc: Infrared   SpO2: 98%   Weight: 229 lb 12.8 oz (104.2 kg)   Height: 5' 3\" (1.6 m)      Wt Readings from Last 3 Encounters:   09/10/21 229 lb 12.8 oz (104.2 kg)   08/16/21 232 lb 9.6 oz (105.5 kg)   01/08/21 223 lb (101.2 kg)     BP Readings from Last 3 Encounters:   09/10/21 126/76   08/16/21 (!) 150/86   01/08/21 120/74     Body mass index is 40.71 kg/m². Facility age limit for growth percentiles is 20 years. Physical Exam  Constitutional:       General: She is not in acute distress. Appearance: Normal appearance. She is not ill-appearing. HENT:      Head: Normocephalic and atraumatic.       Right Ear: Tympanic membrane normal. There is no impacted cerumen. Left Ear: Tympanic membrane normal.      Nose: No congestion or rhinorrhea. Mouth/Throat:      Mouth: Mucous membranes are moist.      Pharynx: No oropharyngeal exudate or posterior oropharyngeal erythema. Eyes:      General:         Right eye: No discharge. Pupils: Pupils are equal, round, and reactive to light. Cardiovascular:      Rate and Rhythm: Normal rate and regular rhythm. Heart sounds: No murmur heard. No friction rub. Pulmonary:      Effort: Pulmonary effort is normal. No respiratory distress. Breath sounds: No stridor. No wheezing or rhonchi. Abdominal:      General: Abdomen is flat. There is no distension. Palpations: There is no mass. Tenderness: There is no abdominal tenderness. There is no guarding or rebound. Hernia: No hernia is present. Musculoskeletal:         General: No swelling, tenderness, deformity or signs of injury. Normal range of motion. Cervical back: Normal range of motion and neck supple. No rigidity. Neurological:      Mental Status: She is alert. No flowsheet data found.     Lab Results   Component Value Date    CHOL 215 09/10/2020    CHOL 215 04/04/2018    CHOL 201 05/12/2017    CHOLFAST 203 07/12/2019    TRIG 98 09/10/2020    TRIG 94 04/04/2018    TRIG 69 05/12/2017    TRIGLYCFAST 79 07/12/2019    HDL 71 09/10/2020    HDL 85 07/12/2019    HDL 79 04/04/2018    LDLCALC 124 09/10/2020    LDLCALC 102 07/12/2019    LDLCALC 117 04/04/2018    GLUCOSE 100 09/10/2020    LABA1C 5.7 09/10/2020       The 10-year ASCVD risk score (Amanda Biswas, et al., 2013) is: 5.1%    Values used to calculate the score:      Age: 64 years      Sex: Female      Is Non- : Yes      Diabetic: No      Tobacco smoker: No      Systolic Blood Pressure: 659 mmHg      Is BP treated: Yes      HDL Cholesterol: 71 mg/dL      Total Cholesterol: 215 mg/dL    Immunization History   Administered Date(s) Administered    Hepatitis B 02/07/2014    Influenza Virus Vaccine 10/02/2017, 10/01/2018, 10/17/2019, 10/14/2020    PPD Test 01/25/2016    Pneumococcal Polysaccharide ([id number]) 03/25/2016    Tdap (Boostrix, Adacel) 09/30/2013, 03/25/2016       Health Maintenance   Topic Date Due    COVID-19 Vaccine (1) Never done    Shingles Vaccine (1 of 2) Never done    Flu vaccine (1) 09/01/2021    A1C test (Diabetic or Prediabetic)  09/10/2021    Potassium monitoring  09/10/2021    Creatinine monitoring  09/10/2021    TSH testing  09/18/2021    Breast cancer screen  09/10/2022    Colon cancer screen colonoscopy  07/30/2024    Lipid screen  09/10/2025    DTaP/Tdap/Td vaccine (3 - Td or Tdap) 03/25/2026    Pneumococcal 0-64 years Vaccine (2 of 2 - PPSV23) 10/21/2027    Hepatitis C screen  Completed    HIV screen  Completed    Hepatitis A vaccine  Aged Out    Hepatitis B vaccine  Aged Out    Hib vaccine  Aged Out    Meningococcal (ACWY) vaccine  Aged Out          ASSESSMENT/PLAN:  1. Well adult exam  2. Essential hypertension  -     Comprehensive Metabolic Panel; Future  -     Lipid Panel; Future  3. Chronic obstructive pulmonary disease, unspecified COPD type (HCC)  -     albuterol sulfate  (90 Base) MCG/ACT inhaler; Inhale 2 puffs into the lungs every 6 hours as needed for Wheezing or Shortness of Breath, Disp-1 each, R-3Normal  4. Prediabetes  -     Hemoglobin A1C; Future  -     Semaglutide (RYBELSUS) 7 MG TABS; Take 1 tablet by mouth daily, Disp-30 tablet, R-11Normal  5. Encounter for screening mammogram for malignant neoplasm of breast  -     RADHA DIGITAL SCREEN W OR WO CAD BILATERAL; Future  6. Colon cancer screening  -     Emma Johns MD, Gastroenterology, [facility]      Return in about 3 months (around 12/10/2021) for asthma 30 min. An electronic signature was used to authenticate this note.     --Gomez Connor MD on 9/10/2021 at 9:00 AM

## 2021-09-14 ENCOUNTER — PATIENT MESSAGE (OUTPATIENT)
Dept: INTERNAL MEDICINE CLINIC | Age: 61
End: 2021-09-14

## 2021-09-14 ENCOUNTER — TELEPHONE (OUTPATIENT)
Dept: ADMINISTRATIVE | Age: 61
End: 2021-09-14

## 2021-09-15 RX ORDER — BUDESONIDE, GLYCOPYRROLATE, AND FORMOTEROL FUMARATE 160; 9; 4.8 UG/1; UG/1; UG/1
2 AEROSOL, METERED RESPIRATORY (INHALATION) 2 TIMES DAILY
Qty: 1 EACH | Refills: 0 | COMMUNITY
Start: 2021-09-15 | End: 2021-12-01 | Stop reason: ALTCHOICE

## 2021-09-15 NOTE — TELEPHONE ENCOUNTER
From: Mayra Prather  To: Renee Ferrell MD  Sent: 9/14/2021 5:07 PM EDT  Subject: Prescription Question    That other inhaler that you sent to the pharmacy is not covered buy insurance so I don't know what I'm going to get now.     Thanks

## 2021-09-15 NOTE — TELEPHONE ENCOUNTER
Received APPROVAL for First Meta 160-9-4. 8MCG/ACT aerosol. See approval letter attached. Please notify patient. Thank you.

## 2021-09-15 NOTE — TELEPHONE ENCOUNTER
Please provide patient with 2 samples of breztri. She will contact her insurance to see which inhalers they cover.

## 2021-09-23 NOTE — TELEPHONE ENCOUNTER
Patient is requesting a refill of their prescription.     Requested Prescriptions     Pending Prescriptions Disp Refills    Fluticasone furoate-vilanterol (BREO ELLIPTA) 200-25 MCG/INH AEPB inhaler 2 each 0     Sig: Inhale 1 puff into the lungs daily     Signed Prescriptions Disp Refills    Budeson-Glycopyrrol-Formoterol (BREZTRI AEROSPHERE) 160-9-4.8 MCG/ACT AERO 1 each 0     Sig: Inhale 2 puffs into the lungs 2 times daily     Authorizing Provider: Malik TRAMMELL        Recent Visits  Date Type Provider Dept   09/10/21 Office Visit Sharmila Ag MD [facility]   01/08/21 Office Visit Sharmila Ag MD [facility]   09/09/20 Office Visit Sharmila Ag MD [facility]   05/07/20 Office Visit Sharmila Ag MD [facility]   Showing recent visits within past 540 days with a meds authorizing provider and meeting all other requirements  Future Appointments  Date Type Provider Dept   12/15/21 Appointment Sharmila Ag MD [facility]   Showing future appointments within next 150 days with a meds authorizing provider and meeting all other requirements     9/10/2021

## 2021-10-08 ENCOUNTER — TELEPHONE (OUTPATIENT)
Dept: INTERNAL MEDICINE CLINIC | Age: 61
End: 2021-10-08

## 2021-10-08 DIAGNOSIS — J44.9 CHRONIC OBSTRUCTIVE PULMONARY DISEASE, UNSPECIFIED COPD TYPE (HCC): Primary | ICD-10-CM

## 2021-10-11 RX ORDER — FLUTICASONE FUROATE, UMECLIDINIUM BROMIDE AND VILANTEROL TRIFENATATE 100; 62.5; 25 UG/1; UG/1; UG/1
1 POWDER RESPIRATORY (INHALATION) DAILY
Qty: 2 EACH | Refills: 0 | COMMUNITY
Start: 2021-10-11 | End: 2021-12-01 | Stop reason: SDUPTHER

## 2021-10-11 NOTE — TELEPHONE ENCOUNTER
Samples set aside. Left a message to the patient requesting them to call back if they have questions and informed her the samples were ready for .

## 2021-10-30 ENCOUNTER — NURSE ONLY (OUTPATIENT)
Dept: INTERNAL MEDICINE CLINIC | Age: 61
End: 2021-10-30
Payer: COMMERCIAL

## 2021-10-30 PROCEDURE — 90674 CCIIV4 VAC NO PRSV 0.5 ML IM: CPT | Performed by: INTERNAL MEDICINE

## 2021-10-30 PROCEDURE — 90471 IMMUNIZATION ADMIN: CPT | Performed by: INTERNAL MEDICINE

## 2021-11-11 ENCOUNTER — PATIENT MESSAGE (OUTPATIENT)
Dept: INTERNAL MEDICINE CLINIC | Age: 61
End: 2021-11-11

## 2021-11-11 RX ORDER — CETIRIZINE HYDROCHLORIDE 10 MG/1
10 TABLET ORAL DAILY
Qty: 30 TABLET | Refills: 3 | Status: SHIPPED | OUTPATIENT
Start: 2021-11-11 | End: 2022-02-18 | Stop reason: SDUPTHER

## 2021-11-11 NOTE — TELEPHONE ENCOUNTER
From: Abiel Narayan  To: Dr. Yasemin Montoya: 11/11/2021 11:47 AM EST  Subject: Prescription Question    Hello was needing a refill on the Cetirizine 10 mg and pharmacy is [facility] on [address]    Thank you

## 2021-12-15 ENCOUNTER — OFFICE VISIT (OUTPATIENT)
Dept: INTERNAL MEDICINE CLINIC | Age: 61
End: 2021-12-15
Payer: COMMERCIAL

## 2021-12-15 VITALS
WEIGHT: 232 LBS | DIASTOLIC BLOOD PRESSURE: 80 MMHG | TEMPERATURE: 97.6 F | SYSTOLIC BLOOD PRESSURE: 116 MMHG | BODY MASS INDEX: 41.11 KG/M2 | HEIGHT: 63 IN | OXYGEN SATURATION: 96 % | HEART RATE: 89 BPM

## 2021-12-15 DIAGNOSIS — J44.9 CHRONIC OBSTRUCTIVE PULMONARY DISEASE, UNSPECIFIED COPD TYPE (HCC): ICD-10-CM

## 2021-12-15 PROCEDURE — 99213 OFFICE O/P EST LOW 20 MIN: CPT | Performed by: INTERNAL MEDICINE

## 2021-12-15 RX ORDER — FLUTICASONE FUROATE, UMECLIDINIUM BROMIDE AND VILANTEROL TRIFENATATE 100; 62.5; 25 UG/1; UG/1; UG/1
1 POWDER RESPIRATORY (INHALATION) DAILY
Qty: 1 EACH | Refills: 0 | COMMUNITY
Start: 2021-12-15 | End: 2022-03-16 | Stop reason: SDUPTHER

## 2021-12-15 RX ORDER — FLUTICASONE FUROATE, UMECLIDINIUM BROMIDE AND VILANTEROL TRIFENATATE 100; 62.5; 25 UG/1; UG/1; UG/1
1 POWDER RESPIRATORY (INHALATION) DAILY
Qty: 1 EACH | Refills: 0 | Status: SHIPPED | OUTPATIENT
Start: 2021-12-15 | End: 2021-12-15 | Stop reason: CLARIF

## 2021-12-15 NOTE — PROGRESS NOTES
Armin Denny (:  1960) is a 63 y.o. female,Established patient, here for evaluation of the following chief complaint(s):  Asthma         ASSESSMENT/PLAN:  1. Chronic obstructive pulmonary disease, unspecified COPD type ([facility] [address]      No follow-ups on file. Subjective   SUBJECTIVE/OBJECTIVE:  HPI COPD:  Current treatment includes inhaled steroid, combined beta agonist/steroid inhaler. Residual symptoms: non-productive cough and wheezing. She denies any other symptoms. She requires her rescue inhaler 2 time(s) per week. Review of Systems       Objective    Vitals:    12/15/21 1655   BP: 116/80   Site: Right Upper Arm   Position: Sitting   Cuff Size: Large Adult   Pulse: 89   Temp: 97.6 °F (36.4 °C)   TempSrc: Infrared   SpO2: 96%   Weight: 232 lb (105.2 kg)   Height: 5' 3\" (1.6 m)      Wt Readings from Last 3 Encounters:   12/15/21 232 lb (105.2 kg)   09/10/21 229 lb 12.8 oz (104.2 kg)   21 232 lb 9.6 oz (105.5 kg)     BP Readings from Last 3 Encounters:   12/15/21 116/80   09/10/21 126/76   21 (!) 150/86     Body mass index is 41.1 kg/m². Facility age limit for growth percentiles is 20 years. Physical Exam       {Time Documentation Optional:[id number]}      An electronic signature was used to authenticate this note.     --Samuel Feliz MD

## 2021-12-16 NOTE — PROGRESS NOTES
Jolene Harrison (:  1960) is a 63 y.o. female,Established patient, here for evaluation of the following chief complaint(s):  Asthma         ASSESSMENT/PLAN:  1. Chronic obstructive pulmonary disease, unspecified COPD type ([facility]  -     fluticasone-umeclidin-vilant (Amie Melgar) 100-62.5-25 MCG/INH AEPB; Inhale 1 puff into the lungs daily, Disp-1 each, R-0Sample      Return in about 5 months (around 5/15/2022) for hypertension / asthma 30 min. Subjective   SUBJECTIVE/OBJECTIVE:  HPI COPD:  Current treatment includes combined beta agonist/steroid inhaler, anticholinergic inhaler. Residual symptoms: wheezing, nasal congestion and sneezing. She denies increased dyspnea, purulent nasal discharge, sinus pressure, sore throat. She requires her rescue inhaler 1 time(s) per week. Review of Systems       Objective    Vitals:    12/15/21 1655   BP: 116/80   Site: Right Upper Arm   Position: Sitting   Cuff Size: Large Adult   Pulse: 89   Temp: 97.6 °F (36.4 °C)   TempSrc: Infrared   SpO2: 96%   Weight: 232 lb (105.2 kg)   Height: 5' 3\" (1.6 m)      Wt Readings from Last 3 Encounters:   12/15/21 232 lb (105.2 kg)   09/10/21 229 lb 12.8 oz (104.2 kg)   21 232 lb 9.6 oz (105.5 kg)     BP Readings from Last 3 Encounters:   12/15/21 116/80   09/10/21 126/76   21 (!) 150/86     Body mass index is 41.1 kg/m². Facility age limit for growth percentiles is 20 years. Physical Exam  Constitutional:       Appearance: Normal appearance. HENT:      Head: Normocephalic and atraumatic. Right Ear: Tympanic membrane and ear canal normal.      Left Ear: Tympanic membrane and ear canal normal.      Nose: Nose normal. No congestion or rhinorrhea. Mouth/Throat:      Mouth: Mucous membranes are moist.      Pharynx: No oropharyngeal exudate or posterior oropharyngeal erythema. Eyes:      General:         Right eye: No discharge. Left eye: No discharge.       Pupils: Pupils are equal, round, and reactive to light. Cardiovascular:      Rate and Rhythm: Normal rate and regular rhythm. Pulses: Normal pulses. Heart sounds: No murmur heard. No friction rub. Pulmonary:      Effort: Pulmonary effort is normal. No respiratory distress. Breath sounds: No stridor. No wheezing or rhonchi. Musculoskeletal:      Cervical back: Normal range of motion. No rigidity or tenderness. Neurological:      Mental Status: She is alert. An electronic signature was used to authenticate this note.     --Thien Woodward MD

## 2022-01-29 DIAGNOSIS — E05.90 HYPERTHYROIDISM: ICD-10-CM

## 2022-01-31 RX ORDER — METHIMAZOLE 5 MG/1
TABLET ORAL
Qty: 45 TABLET | Refills: 0 | Status: SHIPPED | OUTPATIENT
Start: 2022-01-31 | End: 2022-08-24

## 2022-01-31 NOTE — TELEPHONE ENCOUNTER
Medication:   Requested Prescriptions     Pending Prescriptions Disp Refills    methIMAzole (TAPAZOLE) 5 MG tablet 45 tablet 0     Sig: TAKE ONE TABLET BY MOUTH EVERY OTHER DAY       Last Filled:   01/24/2022    Patient Phone Number: [phone] (home)     Last appt: 8/16/2021   Next appt: 02/21/2022    Last Labs DM:   Lab Results   Component Value Date    LABA1C 5.4 09/10/2021

## 2022-02-07 DIAGNOSIS — I10 ESSENTIAL HYPERTENSION: ICD-10-CM

## 2022-02-07 RX ORDER — HYDROCHLOROTHIAZIDE 25 MG/1
TABLET ORAL
Qty: 90 TABLET | Refills: 3 | Status: SHIPPED | OUTPATIENT
Start: 2022-02-07

## 2022-02-07 NOTE — TELEPHONE ENCOUNTER
Patient is requesting a refill of their prescription.     Requested Prescriptions     Pending Prescriptions Disp Refills    hydroCHLOROthiazide (HYDRODIURIL) 25 MG tablet [Pharmacy Med Name: HYDROCHLOROTHIAZIDE 25MG TABS] 90 tablet 3     Sig: TAKE 1 TABLET BY MOUTH ONE TIME A DAY        Recent Visits  Date Type Provider Dept   12/15/21 Office Visit Bradley Snyder MD [facility]   09/10/21 Office Visit Bradley Snyder MD [facility]   01/08/21 Office Visit Bradley Snyder MD [facility]   09/09/20 Office Visit Bradley Snyder MD [facility]   Showing recent visits within past 540 days with a meds authorizing provider and meeting all other requirements  Future Appointments  Date Type Provider Dept   05/18/22 Appointment Bradley Snyder MD [facility]   Showing future appointments within next 150 days with a meds authorizing provider and meeting all other requirements     12/15/2021

## 2022-02-21 ENCOUNTER — OFFICE VISIT (OUTPATIENT)
Dept: ENDOCRINOLOGY | Age: 62
End: 2022-02-21
Payer: COMMERCIAL

## 2022-02-21 VITALS
OXYGEN SATURATION: 96 % | BODY MASS INDEX: 41.64 KG/M2 | HEART RATE: 61 BPM | WEIGHT: 235 LBS | SYSTOLIC BLOOD PRESSURE: 141 MMHG | DIASTOLIC BLOOD PRESSURE: 81 MMHG | HEIGHT: 63 IN

## 2022-02-21 DIAGNOSIS — E05.90 HYPERTHYROIDISM: Primary | ICD-10-CM

## 2022-02-21 PROCEDURE — 99213 OFFICE O/P EST LOW 20 MIN: CPT | Performed by: INTERNAL MEDICINE

## 2022-02-21 NOTE — PROGRESS NOTES
KARISSA Rosario is a 63 y.o. female who is here for a follow-up for management of hyperthyroidism. PCP  Dileep Bunch MD     Has seen Dr. John Bee    She has a PMH of Asthma, hypertension and chronic back pain. Patient was found to have a low TSH ( <0.01 ) and high FT4 (3.8 ) on labs done on 06/11/14. Repeat labs confirmed hyperthyroidism. Thyroid uptake and scan was consistent with Graves disease. No FH of thyroid disease. She was started on tapazole 10 mg BID. She developed a rash and stopped it for 2-3 days. She restarted it at 10 mg daily. Has to take hydroxyzine with it to control itching. Took  tapazole  5 mg daily since 04/16---> 06/17    Resumed in 2018. Now on Methimazole 5 mg every other day. She is experiencing occasional  increased sweating    No palpitations, tremors. No diarrhea      Weight has been stable. Thyroid US 06/14 INDICATION: Hyperthyroidism. COMPARISON: None. FINDINGS: Sonography of the thyroid gland was performed. The entire thyroid gland is enlarged and heterogeneous in echo texture. The right lobe measures 5.6 x 2.1 x 3.1 cm. The left lobe measures 5.8 x 2.2 x 2.8 cm. There is no discrete right thyroid nodules identified. Within the left thyroid lobe, scattered cysts are noted with no discrete nodule. THYROID UPTAKE SCAN. JUL 02, 2014 08:31:52 AM: INDICATION: \"Hyperthyroidism. \" COMPARISON: Ultrasound thyroid, 6/25/2014. FINDINGS: 24 hours following the administration of the Iodine-123 capsule, the uptake is calculated as 69.9%. The uptake pattern is symmetrical. The thyroid gland is globally enlarged, consistent with the ultrasound. Worked  at our office, currently working at home    No visits with results within 2 Month(s) from this visit.    Latest known visit with results is:   Orders Only on 09/10/2021   Component Date Value Ref Range Status    Hemoglobin A1C 09/10/2021 5.4  See comment % Final    Comment: Comment:  Diagnosis of Diabetes: > or = 6.5%  Increased risk of diabetes (Prediabetes): 5.7-6.4%  Glycemic Control: Nonpregnant Adults: <7.0%                    Pregnant: <6.0%        eAG 09/10/2021 108.3  mg/dL Final    Cholesterol, Total 09/10/2021 209* 0 - 199 mg/dL Final    Triglycerides 09/10/2021 69  0 - 150 mg/dL Final    HDL 09/10/2021 65* 40 - 60 mg/dL Final    LDL Calculated 09/10/2021 130* <100 mg/dL Final    VLDL Cholesterol Calculated 09/10/2021 14  Not Established mg/dL Final    Sodium 09/10/2021 140  136 - 145 mmol/L Final    Potassium 09/10/2021 4.6  3.5 - 5.1 mmol/L Final    Chloride 09/10/2021 98* 99 - 110 mmol/L Final    CO2 09/10/2021 29  21 - 32 mmol/L Final    Anion Gap 09/10/2021 13  3 - 16 Final    Glucose 09/10/2021 112* 70 - 99 mg/dL Final    BUN 09/10/2021 16  7 - 20 mg/dL Final    CREATININE 09/10/2021 0.9  0.6 - 1.1 mg/dL Final    GFR Non- 09/10/2021 >60  >60 Final    Comment: >60 mL/min/1.73m2 EGFR, calc. for ages 25 and older using the  MDRD formula (not corrected for weight), is valid for stable  renal function.  GFR  09/10/2021 >60  >60 Final    Comment: Chronic Kidney Disease: less than 60 ml/min/1.73 sq.m. Kidney Failure: less than 15 ml/min/1.73 sq.m. Results valid for patients 18 years and older.       Calcium 09/10/2021 9.4  8.3 - 10.6 mg/dL Final    Total Protein 09/10/2021 7.9  6.4 - 8.2 g/dL Final    Albumin 09/10/2021 4.4  3.4 - 5.0 g/dL Final    Albumin/Globulin Ratio 09/10/2021 1.3  1.1 - 2.2 Final    Total Bilirubin 09/10/2021 0.7  0.0 - 1.0 mg/dL Final    Alkaline Phosphatase 09/10/2021 80  40 - 129 U/L Final    ALT 09/10/2021 12  10 - 40 U/L Final    AST 09/10/2021 15  15 - 37 U/L Final    Globulin 09/10/2021 3.5  g/dL Final    T3, Free 09/10/2021 3.4  2.3 - 4.2 pg/mL Final    T4 Free 09/10/2021 1.6  0.9 - 1.8 ng/dL Final    TSH 09/10/2021 0.12* 0.27 - 4.20 uIU/mL Final    T3, Total 09/10/2021 1. 18  0.80 - 2.00 ng/mL Final      ROS: Scanned, reviewed    Past Medical History:   Diagnosis Date    Chronic back pain     COPD (chronic obstructive pulmonary disease) ([facility] [address]     confirmed with PFT     HTN, goal below 140/90     Hyperthyroidism     Grave's dz    Obesity (BMI 30-39. 9) 8/31/2019    Red eyes     Restrictive lung disease     secondary to weight    Surgical menopause     LMP at age 39     Past Surgical History:   Procedure Laterality Date    HERNIA REPAIR      20 years ago     HYSTERECTOMY      10 years ago; fibroid    SHOULDER SURGERY      right arthroscopy     Current Outpatient Medications   Medication Sig Dispense Refill    cetirizine (ZYRTEC) 10 MG tablet Take 1 tablet by mouth daily 30 tablet 3    amoxicillin (AMOXIL) 875 MG tablet Take 1 tablet by mouth 2 times daily for 10 days 20 tablet 0    hydroCHLOROthiazide (HYDRODIURIL) 25 MG tablet TAKE 1 TABLET BY MOUTH ONE TIME A DAY 90 tablet 3    methIMAzole (TAPAZOLE) 5 MG tablet TAKE ONE TABLET BY MOUTH EVERY OTHER DAY 45 tablet 0    fluticasone (FLONASE) 50 MCG/ACT nasal spray 2 sprays by Each Nostril route daily 16 g 11    fluticasone-umeclidin-vilant (TRELEGY ELLIPTA) 100-62.5-25 MCG/INH AEPB Inhale 1 puff into the lungs daily 1 each 0    fluticasone-umeclidin-vilant (TRELEGY ELLIPTA) 100-62.5-25 MCG/INH AEPB Inhale 1 puff into the lungs daily 60 each 11    Semaglutide (RYBELSUS) 7 MG TABS Take 1 tablet by mouth daily 30 tablet 11    Cholecalciferol (VITAMIN D) 50 MCG (2000 UT) CAPS capsule Take 1 capsule by mouth daily 30 capsule 11    albuterol sulfate  (90 Base) MCG/ACT inhaler Inhale 2 puffs into the lungs every 6 hours as needed for Wheezing or Shortness of Breath 1 each 3    ibuprofen (ADVIL;MOTRIN) 800 MG tablet Take 1 tablet by mouth every 8 hours as needed for Pain 90 tablet 5    montelukast (SINGULAIR) 10 MG tablet TAKE 1 TABLET BY MOUTH NIGHTLY 90 tablet 3    montelukast (SINGULAIR) 10 MG tablet TAKE 1 TABLET BY MOUTH NIGHTLY 90 tablet 3    fluticasone (FLONASE) 50 MCG/ACT nasal spray 2 sprays in each nostril daily. 1 Bottle 11    Multiple Vitamins-Minerals (MULTIVITAMIN ADULT) TABS Take 1 tablet by mouth daily 30 tablet 11    Handicap Placard MISC One year 1 each 0     No current facility-administered medications for this visit. Vitals:    02/21/22 1654   BP: (!) 141/81   Pulse: 61   SpO2: 96%     Constitutional: Well-developed, appears stated age, cooperative, in no acute distress  H/E/N/M/T:atraumatic, normocephalic, external ears, nose, lips normal without lesions  Eyes: Lids, lashes, conjunctivae and sclerae normal, No proptosis, no redness  Neck: supple, symmetrical, no swelling  Skin: No obvious rashes or lesions present. Skin and hair texture normal  Psychiatric: Judgement and Insight:  judgement and insight appear normal  Neuro: Normal without focal findings, speech is normal normal, speech is spontaneous  Chest: No labored breathing, no chest deformity, no stridor  Musculoskeletal: No joint deformity, swelling    Assessment/Plan    1. Hyperthyroidism    This 64 yrs old female was found to have a low TSH and high Free T4. Repeat labs confirmed hyperthyroidism with positive Thyrotropin receptor antibodies. Thyroid uptake scan also showed diffuse uptake of 69.9 % consistent with Graves disease. Was on tapazole since 06/14--2017    Resumed in 2018. On methimazole 5 mg every other day     Discussed option of continuing anti-thyroid medication, MCKENZIE ablation and surgery. Saw ophthalmology. Has mild exophthalmos. Cleared for MCKENZIE as per ophthalmology. TSH 0.13---> 0.04---> 0.56---> 0.08---> 0.12  Free T4 of 1.4--->  1.7---> 1.3---> 1.6    Continue methimazole 5 mg every other day ( Monday -Wednesday-Friday)  Repeat labs and follow-up in 6 months. 2. Obesity. Tried contrave and sexadna. Using oral Semaglutide as per PCP.      3. HTN managed by PCP

## 2022-03-02 DIAGNOSIS — E05.90 HYPERTHYROIDISM: ICD-10-CM

## 2022-03-02 LAB
T3 FREE: 3.1 PG/ML (ref 2.3–4.2)
T4 FREE: 1.4 NG/DL (ref 0.9–1.8)
TSH REFLEX: 0.61 UIU/ML (ref 0.27–4.2)

## 2022-03-09 ENCOUNTER — PATIENT MESSAGE (OUTPATIENT)
Dept: INTERNAL MEDICINE CLINIC | Age: 62
End: 2022-03-09

## 2022-03-09 DIAGNOSIS — J44.9 CHRONIC OBSTRUCTIVE PULMONARY DISEASE, UNSPECIFIED COPD TYPE (HCC): ICD-10-CM

## 2022-03-10 NOTE — TELEPHONE ENCOUNTER
From: José Narayan  To: Dr. Fernandez Pals: 3/9/2022 9:41 PM EST  Subject: Arpriya Mon  Was wanting to see if I can get some samples of Trelegy.   Thank you

## 2022-03-16 RX ORDER — FLUTICASONE FUROATE, UMECLIDINIUM BROMIDE AND VILANTEROL TRIFENATATE 100; 62.5; 25 UG/1; UG/1; UG/1
1 POWDER RESPIRATORY (INHALATION) DAILY
Qty: 2 EACH | Refills: 0 | COMMUNITY
Start: 2022-03-16 | End: 2022-04-15 | Stop reason: SDUPTHER

## 2022-05-11 RX ORDER — MONTELUKAST SODIUM 10 MG/1
10 TABLET ORAL NIGHTLY
Qty: 90 TABLET | Refills: 0 | Status: SHIPPED | OUTPATIENT
Start: 2022-05-11

## 2022-05-18 ENCOUNTER — OFFICE VISIT (OUTPATIENT)
Dept: INTERNAL MEDICINE CLINIC | Age: 62
End: 2022-05-18
Payer: COMMERCIAL

## 2022-05-18 VITALS
DIASTOLIC BLOOD PRESSURE: 78 MMHG | WEIGHT: 226 LBS | TEMPERATURE: 98.2 F | SYSTOLIC BLOOD PRESSURE: 126 MMHG | HEIGHT: 63 IN | BODY MASS INDEX: 40.04 KG/M2

## 2022-05-18 DIAGNOSIS — J44.9 CHRONIC OBSTRUCTIVE PULMONARY DISEASE, UNSPECIFIED COPD TYPE (HCC): ICD-10-CM

## 2022-05-18 DIAGNOSIS — R73.9 HYPERGLYCEMIA: ICD-10-CM

## 2022-05-18 DIAGNOSIS — I10 ESSENTIAL HYPERTENSION: Primary | ICD-10-CM

## 2022-05-18 PROCEDURE — 99214 OFFICE O/P EST MOD 30 MIN: CPT | Performed by: INTERNAL MEDICINE

## 2022-05-18 RX ORDER — FLUTICASONE FUROATE, UMECLIDINIUM BROMIDE AND VILANTEROL TRIFENATATE 100; 62.5; 25 UG/1; UG/1; UG/1
1 POWDER RESPIRATORY (INHALATION) DAILY
Qty: 3 EACH | Refills: 0 | COMMUNITY
Start: 2022-05-18 | End: 2022-09-21 | Stop reason: SDUPTHER

## 2022-05-18 ASSESSMENT — PATIENT HEALTH QUESTIONNAIRE - PHQ9
SUM OF ALL RESPONSES TO PHQ QUESTIONS 1-9: 1
SUM OF ALL RESPONSES TO PHQ QUESTIONS 1-9: 1
2. FEELING DOWN, DEPRESSED OR HOPELESS: 0
SUM OF ALL RESPONSES TO PHQ QUESTIONS 1-9: 1
1. LITTLE INTEREST OR PLEASURE IN DOING THINGS: 1
SUM OF ALL RESPONSES TO PHQ QUESTIONS 1-9: 1
SUM OF ALL RESPONSES TO PHQ9 QUESTIONS 1 & 2: 1

## 2022-05-18 NOTE — PROGRESS NOTES
Phyllis Mac (:  1960) is a 63 y.o. female,Established patient, here for evaluation of the following chief complaint(s):  Asthma and Hypertension         ASSESSMENT/PLAN:  1. Essential hypertension  -     Comprehensive Metabolic Panel; Future  -     Lipid Panel; Future  2. Chronic obstructive pulmonary disease, unspecified COPD type ([facility]  -     fluticasone-umeclidin-vilant (Kimi Alex) 100-62.5-25 MCG/INH AEPB; Inhale 1 puff into the lungs daily, Disp-3 each, R-0Sample  3. Hyperglycemia  -     Hemoglobin A1C; Future      Return in about 4 months (around 2022) for annual physical.         Subjective   SUBJECTIVE/OBJECTIVE:  HPI   COPD:  Current treatment includes combined beta agonist/steroid inhaler, anticholinergic inhaler. Residual symptoms: none. She denies any other symptoms. She requires her rescue inhaler 1 time(s) per month. Treatment Adherence:   Medication compliance:  compliant most of the time  Diet compliance:  compliant most of the time  Weight trend: stable  Current exercise: no regular exercise  Barriers: none    Hypertension:  Home blood pressure monitoring: No. Patient denies chest pain, shortness of breath and headache. Antihypertensive medication side effects: no medication side effects noted. Use of agents associated with hypertension: none. Sodium (mmol/L)   Date Value   09/10/2021 140    BUN (mg/dL)   Date Value   09/10/2021 16    Glucose (mg/dL)   Date Value   09/10/2021 112 (H)      Potassium (mmol/L)   Date Value   09/10/2021 4.6    CREATININE (mg/dL)   Date Value   09/10/2021 0.9         Hyperlipidemia:  No new myalgias or GI upset on no medications.      Lab Results   Component Value Date    CHOL 209 (H) 09/10/2021    TRIG 69 09/10/2021    HDL 65 (H) 09/10/2021    LDLCALC 130 (H) 09/10/2021     Lab Results   Component Value Date    ALT 12 09/10/2021    AST 15 09/10/2021          Review of Systems       Objective Vitals:    05/18/22 1646   BP: 126/78   Site: Right Upper Arm   Position: Sitting   Cuff Size: Large Adult   Temp: 98.2 °F (36.8 °C)   TempSrc: Infrared   Weight: 226 lb (102.5 kg)   Height: 5' 3\" (1.6 m)      Wt Readings from Last 3 Encounters:   05/18/22 226 lb (102.5 kg)   02/21/22 235 lb (106.6 kg)   12/15/21 232 lb (105.2 kg)     BP Readings from Last 3 Encounters:   05/18/22 126/78   02/21/22 (!) 141/81   12/15/21 116/80     Body mass index is 40.03 kg/m². Facility age limit for growth percentiles is 20 years. Physical Exam  Constitutional:       General: She is not in acute distress. Appearance: Normal appearance. She is not ill-appearing. HENT:      Head: Normocephalic. Right Ear: Tympanic membrane normal.      Left Ear: Tympanic membrane normal.      Mouth/Throat:      Mouth: Mucous membranes are moist.      Pharynx: No oropharyngeal exudate. Eyes:      General:         Right eye: No discharge. Left eye: No discharge. Pupils: Pupils are equal, round, and reactive to light. Cardiovascular:      Rate and Rhythm: Normal rate and regular rhythm. Pulmonary:      Effort: Pulmonary effort is normal. No respiratory distress. Breath sounds: No stridor. Musculoskeletal:      Cervical back: Normal range of motion and neck supple. Neurological:      Mental Status: She is alert. An electronic signature was used to authenticate this note.     --Brad Gutierres MD

## 2022-07-12 RX ORDER — DOXYCYCLINE HYCLATE 100 MG
100 TABLET ORAL 2 TIMES DAILY
Qty: 14 TABLET | Refills: 0 | Status: SHIPPED | OUTPATIENT
Start: 2022-07-12 | End: 2022-07-19

## 2022-08-23 DIAGNOSIS — E05.90 HYPERTHYROIDISM: ICD-10-CM

## 2022-08-24 RX ORDER — METHIMAZOLE 5 MG/1
TABLET ORAL
Qty: 15 TABLET | Refills: 0 | Status: SHIPPED | OUTPATIENT
Start: 2022-08-24 | End: 2022-09-23 | Stop reason: SDUPTHER

## 2022-08-24 RX ORDER — METHIMAZOLE 5 MG/1
TABLET ORAL
Qty: 45 TABLET | Refills: 0 | OUTPATIENT
Start: 2022-08-24

## 2022-08-24 NOTE — TELEPHONE ENCOUNTER
Medication:   Requested Prescriptions     Pending Prescriptions Disp Refills    methIMAzole (TAPAZOLE) 5 MG tablet [Pharmacy Med Name: METHIMAZOLE 5MG TABS] 45 tablet 3     Sig: TAKE ONE TABLET BY MOUTH EVERY OTHER DAY       Last Filled:      Patient Phone Number: [phone] (home)     Last appt: 8/16/2021   Next appt: 8/23/2022    Last Thyroid:   Lab Results   Component Value Date/Time    TSH 0.08 09/18/2020 10:59 AM    FT3 3.1 03/02/2022 12:06 PM    Q0JJONM 1.18 09/10/2021 09:09 AM    T4FREE 1.4 03/02/2022 12:06 PM

## 2022-09-21 DIAGNOSIS — E05.90 HYPERTHYROIDISM: ICD-10-CM

## 2022-09-22 DIAGNOSIS — E05.90 HYPERTHYROIDISM: ICD-10-CM

## 2022-09-22 LAB
T3 FREE: 3 PG/ML (ref 2.3–4.2)
T3 TOTAL: 1.18 NG/ML (ref 0.8–2)
T4 FREE: 1.4 NG/DL (ref 0.9–1.8)
TSH REFLEX: 0.33 UIU/ML (ref 0.27–4.2)

## 2022-09-22 RX ORDER — METHIMAZOLE 5 MG/1
TABLET ORAL
Qty: 15 TABLET | Refills: 0 | OUTPATIENT
Start: 2022-09-22

## 2022-09-22 NOTE — TELEPHONE ENCOUNTER
Medication:   Requested Prescriptions     Pending Prescriptions Disp Refills    methIMAzole (TAPAZOLE) 5 MG tablet [Pharmacy Med Name: METHIMAZOLE 5MG TABS] 15 tablet 0     Sig: TAKE ONE TABLET BY MOUTH EVERY OTHER DAY       Last Filled:      Patient Phone Number: [phone] (home)     Last appt: 8/16/2021   Next appt: Visit date not found    Last Thyroid:   Lab Results   Component Value Date/Time    TSH 0.08 09/18/2020 10:59 AM    FT3 3.0 09/21/2022 02:48 PM    I4NSOHB 1.18 09/21/2022 02:48 PM    T4FREE 1.4 09/21/2022 02:48 PM

## 2022-09-23 ENCOUNTER — TELEMEDICINE (OUTPATIENT)
Dept: ENDOCRINOLOGY | Age: 62
End: 2022-09-23
Payer: COMMERCIAL

## 2022-09-23 DIAGNOSIS — E05.90 HYPERTHYROIDISM: ICD-10-CM

## 2022-09-23 PROCEDURE — 99214 OFFICE O/P EST MOD 30 MIN: CPT | Performed by: INTERNAL MEDICINE

## 2022-09-23 RX ORDER — METHIMAZOLE 5 MG/1
TABLET ORAL
Qty: 15 TABLET | Refills: 5 | Status: SHIPPED | OUTPATIENT
Start: 2022-09-23

## 2022-09-23 RX ORDER — METHIMAZOLE 5 MG/1
TABLET ORAL
Qty: 15 TABLET | Refills: 5 | Status: SHIPPED | OUTPATIENT
Start: 2022-09-23 | End: 2022-09-23 | Stop reason: SDUPTHER

## 2022-09-23 NOTE — PROGRESS NOTES
Patient was evaluated through a synchronous (real-time) audio-video  encounter. The patient (or guardian if applicable) is aware that this is a billable service, which includes applicable co-pays. This Virtual Visit was  conducted with patient's (and/or legal guardian's) consent. The visit was conducted pursuant to the emergency declaration under the [address], [facility] authority and the [employer] Act. Patient identification was verified,  and a caregiver was present when appropriate. The patient was located in a state where the provider was licensed to provide care. Patient at home location  Provider at home location during call    KARISSA Valle is a 63 y.o. female who is here for a follow-up for management of hyperthyroidism. Interim:    Intentional weight loss    PCP  Thomas Fine MD     Has seen Dr. Rodriguez Wilmar    She has a PMH of Asthma, hypertension and chronic back pain. Patient was found to have a low TSH ( <0.01 ) and high FT4 (3.8 ) on labs done on 06/11/14. Repeat labs confirmed hyperthyroidism. Thyroid uptake and scan was consistent with Graves disease. No FH of thyroid disease. She was started on tapazole 10 mg BID. She developed a rash and stopped it for 2-3 days. She restarted it at 10 mg daily. Has to take hydroxyzine with it to control itching. Took  tapazole  5 mg daily since 04/16---> 06/17    Resumed in 2018. Now on Methimazole 5 mg every other day. She is experiencing occasional  increased sweating    No palpitations, tremors. No diarrhea      Weight has been stable. Reports left eye side issue, seen opthamology  Was not told due to Graves' disease  Has used steroid drops    Thyroid US 06/14 INDICATION: Hyperthyroidism. COMPARISON: None. FINDINGS: Sonography of the thyroid gland was performed.  The entire thyroid gland is enlarged and heterogeneous in echo texture. The right lobe measures 5.6 x 2.1 x 3.1 cm. The left lobe measures 5.8 x 2.2 x 2.8 cm. There is no discrete right thyroid nodules identified. Within the left thyroid lobe, scattered cysts are noted with no discrete nodule. THYROID UPTAKE SCAN. JUL 02, 2014 08:31:52 AM: INDICATION: \"Hyperthyroidism. \" COMPARISON: Ultrasound thyroid, 6/25/2014. FINDINGS: 24 hours following the administration of the Iodine-123 capsule, the uptake is calculated as 69.9%. The uptake pattern is symmetrical. The thyroid gland is globally enlarged, consistent with the ultrasound. Worked  at our office, currently working at home    Orders Only on 09/21/2022   Component Date Value Ref Range Status    T3, Free 09/21/2022 3.0  2.3 - 4.2 pg/mL Final    T4 Free 09/21/2022 1.4  0.9 - 1.8 ng/dL Final    TSH 09/21/2022 0.33  0.27 - 4.20 uIU/mL Final    T3, Total 09/21/2022 1.18  0.80 - 2.00 ng/mL Final      ROS:   Constitutional: Negative for weight loss and malaise/fatigue. Negative for fever and chills. HENT: Negative for hearing loss, ear pain, nosebleeds, neck pain and tinnitus. Eyes: Negative for blurred vision. Negative for double vision, photophobia and pain. Respiratory: Negative for cough and sputum production. Cardiovascular: Negative for chest pain, palpitations and leg swelling. Gastrointestinal: Negative for nausea, vomiting and abdominal pain. Genitourinary: Negative for dysuria, urgency and frequency. Musculoskeletal: Negative for back pain. No joint pain  Skin: Negative for itching and rash. Neurological: Negative for dizziness. Negative for tingling, tremors, focal weakness and headaches. Endo/Heme/Allergies: see HPI  Psychiatric/Behavioral: Negative for depression and substance abuse.      Past Medical History:   Diagnosis Date    Chronic back pain     COPD (chronic obstructive pulmonary disease) ([facility] [address]     confirmed with PFT     HTN, goal below 140/90     Hyperthyroidism Shahrzad's dz    Obesity (BMI 30-39. 9) 8/31/2019    Red eyes     Restrictive lung disease     secondary to weight    Surgical menopause     LMP at age 39     Past Surgical History:   Procedure Laterality Date    HERNIA REPAIR      20 years ago     HYSTERECTOMY      10 years ago; fibroid    SHOULDER SURGERY      right arthroscopy     Current Outpatient Medications   Medication Sig Dispense Refill    fluticasone-umeclidin-vilant (TRELEGY ELLIPTA) 100-62.5-25 MCG/INH AEPB Inhale 1 puff into the lungs daily 2 each 0    methIMAzole (TAPAZOLE) 5 MG tablet TAKE ONE TABLET BY MOUTH EVERY OTHER DAY 15 tablet 0    montelukast (SINGULAIR) 10 MG tablet Take 1 tablet by mouth nightly 90 tablet 0    hydroCHLOROthiazide (HYDRODIURIL) 25 MG tablet TAKE 1 TABLET BY MOUTH ONE TIME A DAY 90 tablet 3    Semaglutide (RYBELSUS) 7 MG TABS Take 1 tablet by mouth daily 30 tablet 11    Cholecalciferol (VITAMIN D) 50 MCG (2000 UT) CAPS capsule Take 1 capsule by mouth daily 30 capsule 11    albuterol sulfate  (90 Base) MCG/ACT inhaler Inhale 2 puffs into the lungs every 6 hours as needed for Wheezing or Shortness of Breath 1 each 3    ibuprofen (ADVIL;MOTRIN) 800 MG tablet Take 1 tablet by mouth every 8 hours as needed for Pain 90 tablet 5    fluticasone (FLONASE) 50 MCG/ACT nasal spray 2 sprays in each nostril daily. 1 Bottle 11    Handicap Placard MISC One year 1 each 0     No current facility-administered medications for this visit.             PHYSICAL EXAMINATION:  [ INSTRUCTIONS:  \"[x]\" Indicates a positive item  \"[]\" Indicates a negative item  -- DELETE ALL ITEMS NOT EXAMINED]  Vital Signs: (As obtained by patient/caregiver or practitioner observation)    Blood pressure-  Heart rate-    Respiratory rate-    Temperature-  Pulse oximetry-     Constitutional Appears well-developed and well-nourished No apparent distress        Mental status  Alert and awake  Oriented to person/place/time  Able to follow commands      Eyes:  EOM    [x] Normal    Sclera  [x]  Normal           Discharge [x]  None visible      HENT:   [x] Normocephalic, atraumatic. [x] Mouth/Throat: Mucous membranes are moist.     External Ears [x] Normal  no discharge    Neck: [x] No visualized mass  no swelling    Pulmonary/Chest: [x] Respiratory effort normal.  [x] No visualized signs of difficulty breathing or respiratory distress             Musculoskeletal:   [x] Normal gait with no signs of ataxia         [x] Normal range of motion of neck          Head and neck stable, appears normal ROM, strength good    Neurological:        [x] No Facial Asymmetry (Cranial nerve 7 motor function) (limited exam to video visit)          [x] No gaze palsy                Skin:        [x] No significant exanthematous lesions or discoloration noted on facial skin                 Psychiatric:       [x] Normal Affect [x] No Hallucinations            Other pertinent observable physical exam findings-     Due to this being a TeleHealth encounter, evaluation of the following organ systems is limited: Vitals/Constitutional/EENT/Resp/CV/GI//MS/Neuro/Skin/Heme-Lymph-Imm. Services were provided through a video synchronous discussion virtually to substitute for in-person clinic visit. Assessment/Plan    1. Hyperthyroidism    This 64 yrs old female was found to have a low TSH and high Free T4. Repeat labs confirmed hyperthyroidism with positive Thyrotropin receptor antibodies. Thyroid uptake scan also showed diffuse uptake of 69.9 % consistent with Graves disease. Was on tapazole since 06/14--2017    Resumed in 2018. On methimazole 5 mg every other day     Discussed option of continuing anti-thyroid medication, MCKENZIE ablation and surgery. Saw ophthalmology. Has mild exophthalmos. Cleared for MCKENZIE as per ophthalmology.       TSH 0.13---> 0.04---> 0.56---> 0.08---> 0.12---> 0.33  Free T4 of 1.4--->  1.7---> 1.3---> 1.6    Continue methimazole 5 mg every other day ( Monday -Wednesday-Friday)  Repeat labs and follow-up in 6 months. 2. Obesity. Tried contrave and sexadna. Using oral Semaglutide as per PCP.      3. HTN managed by PCP

## 2022-09-28 ENCOUNTER — OFFICE VISIT (OUTPATIENT)
Dept: INTERNAL MEDICINE CLINIC | Age: 62
End: 2022-09-28
Payer: COMMERCIAL

## 2022-09-28 VITALS
BODY MASS INDEX: 36.03 KG/M2 | DIASTOLIC BLOOD PRESSURE: 80 MMHG | WEIGHT: 203.4 LBS | OXYGEN SATURATION: 99 % | SYSTOLIC BLOOD PRESSURE: 130 MMHG | HEART RATE: 76 BPM

## 2022-09-28 DIAGNOSIS — Z00.00 WELL ADULT EXAM: Primary | ICD-10-CM

## 2022-09-28 DIAGNOSIS — I10 ESSENTIAL HYPERTENSION: ICD-10-CM

## 2022-09-28 DIAGNOSIS — Z12.31 ENCOUNTER FOR SCREENING MAMMOGRAM FOR MALIGNANT NEOPLASM OF BREAST: ICD-10-CM

## 2022-09-28 DIAGNOSIS — R73.03 PREDIABETES: ICD-10-CM

## 2022-09-28 DIAGNOSIS — R73.9 HYPERGLYCEMIA: ICD-10-CM

## 2022-09-28 LAB
A/G RATIO: 1.3 (ref 1.1–2.2)
ALBUMIN SERPL-MCNC: 4.4 G/DL (ref 3.4–5)
ALP BLD-CCNC: 87 U/L (ref 40–129)
ALT SERPL-CCNC: 11 U/L (ref 10–40)
ANION GAP SERPL CALCULATED.3IONS-SCNC: 14 MMOL/L (ref 3–16)
AST SERPL-CCNC: 15 U/L (ref 15–37)
BILIRUB SERPL-MCNC: 0.7 MG/DL (ref 0–1)
BUN BLDV-MCNC: 14 MG/DL (ref 7–20)
CALCIUM SERPL-MCNC: 9.5 MG/DL (ref 8.3–10.6)
CHLORIDE BLD-SCNC: 95 MMOL/L (ref 99–110)
CHOLESTEROL, TOTAL: 178 MG/DL (ref 0–199)
CO2: 31 MMOL/L (ref 21–32)
CREAT SERPL-MCNC: 1.1 MG/DL (ref 0.6–1.1)
GFR AFRICAN AMERICAN: >60
GFR NON-AFRICAN AMERICAN: 51
GLUCOSE BLD-MCNC: 99 MG/DL (ref 70–99)
HDLC SERPL-MCNC: 62 MG/DL (ref 40–60)
LDL CHOLESTEROL CALCULATED: 102 MG/DL
POTASSIUM SERPL-SCNC: 3.9 MMOL/L (ref 3.5–5.1)
SODIUM BLD-SCNC: 140 MMOL/L (ref 136–145)
TOTAL PROTEIN: 7.7 G/DL (ref 6.4–8.2)
TRIGL SERPL-MCNC: 68 MG/DL (ref 0–150)
VLDLC SERPL CALC-MCNC: 14 MG/DL

## 2022-09-28 PROCEDURE — 99396 PREV VISIT EST AGE 40-64: CPT | Performed by: INTERNAL MEDICINE

## 2022-09-28 RX ORDER — FLUTICASONE FUROATE, UMECLIDINIUM BROMIDE AND VILANTEROL TRIFENATATE 100; 62.5; 25 UG/1; UG/1; UG/1
1 POWDER RESPIRATORY (INHALATION) DAILY
Qty: 60 EACH | Refills: 11 | Status: SHIPPED | OUTPATIENT
Start: 2022-09-28

## 2022-09-28 SDOH — ECONOMIC STABILITY: FOOD INSECURITY: WITHIN THE PAST 12 MONTHS, THE FOOD YOU BOUGHT JUST DIDN'T LAST AND YOU DIDN'T HAVE MONEY TO GET MORE.: NEVER TRUE

## 2022-09-28 SDOH — ECONOMIC STABILITY: FOOD INSECURITY: WITHIN THE PAST 12 MONTHS, YOU WORRIED THAT YOUR FOOD WOULD RUN OUT BEFORE YOU GOT MONEY TO BUY MORE.: NEVER TRUE

## 2022-09-28 ASSESSMENT — PATIENT HEALTH QUESTIONNAIRE - PHQ9
SUM OF ALL RESPONSES TO PHQ QUESTIONS 1-9: 2
8. MOVING OR SPEAKING SO SLOWLY THAT OTHER PEOPLE COULD HAVE NOTICED. OR THE OPPOSITE, BEING SO FIGETY OR RESTLESS THAT YOU HAVE BEEN MOVING AROUND A LOT MORE THAN USUAL: 0
2. FEELING DOWN, DEPRESSED OR HOPELESS: 0
6. FEELING BAD ABOUT YOURSELF - OR THAT YOU ARE A FAILURE OR HAVE LET YOURSELF OR YOUR FAMILY DOWN: 0
SUM OF ALL RESPONSES TO PHQ QUESTIONS 1-9: 2
5. POOR APPETITE OR OVEREATING: 1
1. LITTLE INTEREST OR PLEASURE IN DOING THINGS: 0
SUM OF ALL RESPONSES TO PHQ QUESTIONS 1-9: 2
3. TROUBLE FALLING OR STAYING ASLEEP: 0
7. TROUBLE CONCENTRATING ON THINGS, SUCH AS READING THE NEWSPAPER OR WATCHING TELEVISION: 0
9. THOUGHTS THAT YOU WOULD BE BETTER OFF DEAD, OR OF HURTING YOURSELF: 0
10. IF YOU CHECKED OFF ANY PROBLEMS, HOW DIFFICULT HAVE THESE PROBLEMS MADE IT FOR YOU TO DO YOUR WORK, TAKE CARE OF THINGS AT HOME, OR GET ALONG WITH OTHER PEOPLE: 0
4. FEELING TIRED OR HAVING LITTLE ENERGY: 1
SUM OF ALL RESPONSES TO PHQ9 QUESTIONS 1 & 2: 0
SUM OF ALL RESPONSES TO PHQ QUESTIONS 1-9: 2

## 2022-09-28 ASSESSMENT — ANXIETY QUESTIONNAIRES
IF YOU CHECKED OFF ANY PROBLEMS ON THIS QUESTIONNAIRE, HOW DIFFICULT HAVE THESE PROBLEMS MADE IT FOR YOU TO DO YOUR WORK, TAKE CARE OF THINGS AT HOME, OR GET ALONG WITH OTHER PEOPLE: NOT DIFFICULT AT ALL
6. BECOMING EASILY ANNOYED OR IRRITABLE: 0
5. BEING SO RESTLESS THAT IT IS HARD TO SIT STILL: 0
7. FEELING AFRAID AS IF SOMETHING AWFUL MIGHT HAPPEN: 0
1. FEELING NERVOUS, ANXIOUS, OR ON EDGE: 0
4. TROUBLE RELAXING: 0
3. WORRYING TOO MUCH ABOUT DIFFERENT THINGS: 0
2. NOT BEING ABLE TO STOP OR CONTROL WORRYING: 0
GAD7 TOTAL SCORE: 0

## 2022-09-28 ASSESSMENT — ENCOUNTER SYMPTOMS
EYE PAIN: 0
CHEST TIGHTNESS: 0
CHOKING: 0

## 2022-09-28 ASSESSMENT — SOCIAL DETERMINANTS OF HEALTH (SDOH): HOW HARD IS IT FOR YOU TO PAY FOR THE VERY BASICS LIKE FOOD, HOUSING, MEDICAL CARE, AND HEATING?: NOT HARD AT ALL

## 2022-09-28 NOTE — PROGRESS NOTES
2022    Candice Narayan (:  1960) is a 63 y.o. female, here for a preventive medicine evaluation. Patient Active Problem List   Diagnosis    Hyperthyroidism    Graves' disease    Asthma    Restrictive lung disease    COPD (chronic obstructive pulmonary disease) ([facility] [address]    Seasonal allergic rhinitis due to pollen    PND (post-nasal drip)    Phlegm in throat    Sleep apnea in adult    Non morbid obesity, unspecified obesity type    Obesity (BMI 30-39.9)    Episcleritis of right eye     Patient has been to the dentist within the past year. Patient does not text and drive    Review of Systems   Constitutional:  Negative for diaphoresis and fatigue. HENT:  Negative for ear discharge and ear pain. Eyes:  Negative for pain. Respiratory:  Negative for choking and chest tightness. Cardiovascular:  Negative for chest pain and leg swelling. Prior to Visit Medications    Medication Sig Taking?  Authorizing Provider   fluticasone-umeclidin-vilant (TRELEGY ELLIPTA) 100-62.5-25 MCG/INH AEPB Inhale 1 puff into the lungs daily Yes Cristina Schwartz MD   methIMAzole (TAPAZOLE) 5 MG tablet One tab QOD Yes Alfredo Prado MD   fluticasone-umeclidin-vilant (TRELEGY ELLIPTA) 100-62.5-25 MCG/INH AEPB Inhale 1 puff into the lungs daily Yes Cristina Schwartz MD   montelukast (SINGULAIR) 10 MG tablet Take 1 tablet by mouth nightly Yes Cristina Schwartz MD   hydroCHLOROthiazide (HYDRODIURIL) 25 MG tablet TAKE 1 TABLET BY MOUTH ONE TIME A DAY Yes Cristina Schwartz MD   Semaglutide (RYBELSUS) 7 MG TABS Take 1 tablet by mouth daily Yes Cristina Schwartz MD   Cholecalciferol (VITAMIN D) 50 MCG (2000 UT) CAPS capsule Take 1 capsule by mouth daily Yes Cristina Schwartz MD   albuterol sulfate  (90 Base) MCG/ACT inhaler Inhale 2 puffs into the lungs every 6 hours as needed for Wheezing or Shortness of Breath Yes Cristina Schwartz MD   ibuprofen (ADVIL;MOTRIN) 800 MG tablet Take 1 tablet by mouth every 8 hours as needed for Pain Yes Bob Stanley MD   fluticasone Tacjaiden Acosta) 50 MCG/ACT nasal spray 2 sprays in each nostril daily. Yes Bob Stanley MD   Handicap Placard MISC One year Yes Naman Toscano APRN - MONICA        Allergies   Allergen Reactions    Azithromycin Hives       Past Medical History:   Diagnosis Date    Chronic back pain     COPD (chronic obstructive pulmonary disease) ([address]     confirmed with PFT     HTN, goal below 140/90     Hyperthyroidism     Grave's dz    Obesity (BMI 30-39. 9) 8/31/2019    Red eyes     Restrictive lung disease     secondary to weight    Surgical menopause     LMP at age 39       Past Surgical History:   Procedure Laterality Date    HERNIA REPAIR      20 years ago     HYSTERECTOMY (CERVIX STATUS UNKNOWN)      10 years ago; fibroid    SHOULDER SURGERY      right arthroscopy       Social History     Socioeconomic History    Marital status: Single     Spouse name: Not on file    Number of children: Not on file    Years of education: Not on file    Highest education level: Not on file   Occupational History    Occupation: inventory       Comment: [employer]    Tobacco Use    Smoking status: Never    Smokeless tobacco: Never   Vaping Use    Vaping Use: Never used   Substance and Sexual Activity    Alcohol use:  Yes     Alcohol/week: 2.0 standard drinks     Types: 2 Glasses of wine per week    Drug use: No    Sexual activity: Yes     Partners: Male     Birth control/protection: Surgical   Other Topics Concern    Not on file   Social History Narrative    Not on file     Social Determinants of Health     Financial Resource Strain: Low Risk     Difficulty of Paying Living Expenses: Not hard at all   Food Insecurity: No Food Insecurity    Worried About Running Out of Food in the Last Year: Never true    Ran Out of Food in the Last Year: Never true   Transportation Needs: Not on file   Physical Activity: Not on file   Stress: Not on file   Social Connections: Not on file   Intimate Partner Violence: Not on file   Housing Stability: Not on file        Family History   Problem Relation Age of Onset    Cancer Mother         unknown    Liver Disease Mother     Kidney Disease Mother     Diabetes Father     Lupus Sister        ADVANCE DIRECTIVE: N, <no information>  Vitals:    09/28/22 0805   BP: 130/80   Site: Right Upper Arm   Position: Sitting   Cuff Size: Medium Adult   Pulse: 76   SpO2: 99%   Weight: 203 lb 6.4 oz (92.3 kg)      Wt Readings from Last 3 Encounters:   09/28/22 203 lb 6.4 oz (92.3 kg)   05/18/22 226 lb (102.5 kg)   02/21/22 235 lb (106.6 kg)     BP Readings from Last 3 Encounters:   09/28/22 130/80   05/18/22 126/78   02/21/22 (!) 141/81     Body mass index is 36.03 kg/m². Facility age limit for growth percentiles is 20 years. Physical Exam  Constitutional:       General: She is not in acute distress. Appearance: Normal appearance. She is not ill-appearing. HENT:      Head: Normocephalic. Right Ear: Tympanic membrane and ear canal normal.      Left Ear: Tympanic membrane and ear canal normal.      Nose: Nose normal. No congestion or rhinorrhea. Mouth/Throat:      Mouth: Mucous membranes are moist.      Pharynx: No oropharyngeal exudate or posterior oropharyngeal erythema. Eyes:      General:         Right eye: No discharge. Left eye: No discharge. Pupils: Pupils are equal, round, and reactive to light. Cardiovascular:      Rate and Rhythm: Normal rate and regular rhythm. Heart sounds: No murmur heard. No friction rub. Pulmonary:      Effort: Pulmonary effort is normal. No respiratory distress. Breath sounds: No stridor. No wheezing or rhonchi. Abdominal:      General: Abdomen is flat. There is no distension. Palpations: There is no mass. Tenderness: There is no abdominal tenderness. Hernia: No hernia is present.    Musculoskeletal:      Cervical back: Normal range of motion. No rigidity or tenderness. Neurological:      Mental Status: She is alert. No flowsheet data found.     Lab Results   Component Value Date/Time    CHOL 209 09/10/2021 09:09 AM    CHOL 215 09/10/2020 08:28 AM    CHOL 215 04/04/2018 08:45 AM    CHOLFAST 203 07/12/2019 07:42 AM    TRIG 69 09/10/2021 09:09 AM    TRIG 98 09/10/2020 08:28 AM    TRIG 94 04/04/2018 08:45 AM    TRIGLYCFAST 79 07/12/2019 07:42 AM    HDL 65 09/10/2021 09:09 AM    HDL 71 09/10/2020 08:28 AM    HDL 85 07/12/2019 07:42 AM    LDLCALC 130 09/10/2021 09:09 AM    LDLCALC 124 09/10/2020 08:28 AM    LDLCALC 102 07/12/2019 07:42 AM    GLUCOSE 112 09/10/2021 09:09 AM    LABA1C 5.4 09/10/2021 09:09 AM    LABA1C 5.7 09/10/2020 08:28 AM       The 10-year ASCVD risk score (Kitty ROSADO, et al., 2019) is: 6.3%    Values used to calculate the score:      Age: 63 years      Sex: Female      Is Non- : Yes      Diabetic: No      Tobacco smoker: No      Systolic Blood Pressure: 112 mmHg      Is BP treated: Yes      HDL Cholesterol: 65 mg/dL      Total Cholesterol: 209 mg/dL    Immunization History   Administered Date(s) Administered    COVID-19, PFIZER PURPLE top, DILUTE for use, (age 15 y+), 30mcg/0.3mL 09/10/2021, 10/01/2021    Hepatitis B 02/07/2014    Influenza Virus Vaccine 10/02/2017, 10/01/2018, 10/17/2019, 10/14/2020    Influenza, FLUCELVAX, (age 10 mo+), MDCK, PF, 0.5mL 10/30/2021    PPD Test 01/25/2016    Pneumococcal Polysaccharide ([id number]) 03/25/2016    Tdap (Boostrix, Adacel) 09/30/2013, 03/25/2016       Health Maintenance   Topic Date Due    Hepatitis B vaccine (2 of 4 - 4-dose series) 03/07/2014    COVID-19 Vaccine (3 - Booster for Pfizer series) 03/01/2022    Flu vaccine (1) 08/01/2022    Breast cancer screen  09/10/2022    Shingles vaccine (1 of 2) 09/28/2023 (Originally 10/21/2012)    Depression Screen  05/18/2023    Colorectal Cancer Screen  07/30/2024    Diabetes screen  09/10/2024    DTaP/Tdap/Td vaccine (3 - Td or Tdap) 03/25/2026    Lipids  09/10/2026    Pneumococcal 0-64 years Vaccine  Completed    Hepatitis C screen  Completed    HIV screen  Completed    Hepatitis A vaccine  Aged Out    Hib vaccine  Aged Out    Meningococcal (ACWY) vaccine  Aged Out       Assessment & Plan   Well adult exam  Encounter for screening mammogram for malignant neoplasm of breast  -     RADHA DIGITAL SCREEN W OR WO CAD BILATERAL; Future  Prediabetes  Essential hypertension  Return in about 6 months (around 3/28/2023) for asthma 30 min.          --Bob Stanley MD

## 2022-09-29 LAB
ESTIMATED AVERAGE GLUCOSE: 114 MG/DL
HBA1C MFR BLD: 5.6 %

## 2022-10-18 ENCOUNTER — PATIENT MESSAGE (OUTPATIENT)
Dept: INTERNAL MEDICINE CLINIC | Age: 62
End: 2022-10-18

## 2022-10-18 NOTE — TELEPHONE ENCOUNTER
From: Tona Narayan  To: Dr. Mekhi Ortiz  Sent: 10/18/2022 9:20 AM EDT  Subject: Pinch nerve    Good Morning     I was wondering is there something I can do or take for a pinch nerve in my left shoulder I think that's what it is , it's been hurting on and off for about 2 weeks.   Thx

## 2022-11-04 RX ORDER — CETIRIZINE HYDROCHLORIDE 10 MG/1
10 TABLET ORAL DAILY
Qty: 30 TABLET | Refills: 3 | Status: SHIPPED | OUTPATIENT
Start: 2022-11-04 | End: 2022-12-04

## 2022-11-04 NOTE — TELEPHONE ENCOUNTER
Patient is requesting a refill of their prescription.     Requested Prescriptions     Pending Prescriptions Disp Refills    cetirizine (ZYRTEC) 10 MG tablet [Pharmacy Med Name: CETIRIZINE 10MG TABLETS] 30 tablet 3     Sig: TAKE 1 TABLET BY MOUTH DAILY        Recent Visits  Date Type Provider Dept   09/28/22 Office Visit Tona Martinez MD [facility]   05/18/22 Office Visit Tona Martinez MD [facility]   12/15/21 Office Visit Tona Martinez MD [facility]   09/10/21 Office Visit Tona Martinez MD [facility]   Showing recent visits within past 540 days with a meds authorizing provider and meeting all other requirements  Future Appointments  Date Type Provider Dept   11/09/22 Appointment SHANIQUA Donovan - CNP [facility]   03/29/23 Appointment Tona Martinez MD [facility]   Showing future appointments within next 150 days with a meds authorizing provider and meeting all other requirements     9/28/2022

## 2022-11-09 ENCOUNTER — OFFICE VISIT (OUTPATIENT)
Dept: INTERNAL MEDICINE CLINIC | Age: 62
End: 2022-11-09
Payer: COMMERCIAL

## 2022-11-09 VITALS
SYSTOLIC BLOOD PRESSURE: 102 MMHG | OXYGEN SATURATION: 97 % | BODY MASS INDEX: 34.72 KG/M2 | DIASTOLIC BLOOD PRESSURE: 60 MMHG | WEIGHT: 196 LBS | HEART RATE: 87 BPM

## 2022-11-09 DIAGNOSIS — S41.101A WOUND OF RIGHT UPPER EXTREMITY, INITIAL ENCOUNTER: ICD-10-CM

## 2022-11-09 DIAGNOSIS — I10 ESSENTIAL HYPERTENSION: ICD-10-CM

## 2022-11-09 DIAGNOSIS — R42 DIZZINESS: ICD-10-CM

## 2022-11-09 DIAGNOSIS — I10 PRIMARY HYPERTENSION: Primary | ICD-10-CM

## 2022-11-09 PROCEDURE — 90471 IMMUNIZATION ADMIN: CPT | Performed by: NURSE PRACTITIONER

## 2022-11-09 PROCEDURE — 3078F DIAST BP <80 MM HG: CPT | Performed by: NURSE PRACTITIONER

## 2022-11-09 PROCEDURE — 3074F SYST BP LT 130 MM HG: CPT | Performed by: NURSE PRACTITIONER

## 2022-11-09 PROCEDURE — 90674 CCIIV4 VAC NO PRSV 0.5 ML IM: CPT | Performed by: NURSE PRACTITIONER

## 2022-11-09 PROCEDURE — 99214 OFFICE O/P EST MOD 30 MIN: CPT | Performed by: NURSE PRACTITIONER

## 2022-11-09 RX ORDER — CETIRIZINE HYDROCHLORIDE 10 MG/1
10 TABLET ORAL DAILY
Qty: 30 TABLET | Refills: 3 | OUTPATIENT
Start: 2022-11-09 | End: 2022-12-09

## 2022-11-09 RX ORDER — HYDROCHLOROTHIAZIDE 12.5 MG/1
TABLET ORAL
Qty: 90 TABLET | Refills: 0 | Status: SHIPPED | OUTPATIENT
Start: 2022-11-09

## 2022-11-09 RX ORDER — MUPIROCIN CALCIUM 20 MG/G
CREAM TOPICAL
Qty: 15 G | Refills: 0 | Status: SHIPPED | OUTPATIENT
Start: 2022-11-09 | End: 2022-12-09

## 2022-11-09 ASSESSMENT — ENCOUNTER SYMPTOMS
ALLERGIC/IMMUNOLOGIC NEGATIVE: 1
EYES NEGATIVE: 1
RESPIRATORY NEGATIVE: 1
GASTROINTESTINAL NEGATIVE: 1

## 2022-11-09 NOTE — PATIENT INSTRUCTIONS
If BP is 100 than or less do not take hydrodiuril  Start exercising at least once a day  Apply antibiotic to left middle finger for the next week, keep nail clean

## 2022-11-09 NOTE — PROGRESS NOTES
Samantha Gallo (:  1960) is a 63 y.o. female,Established patient, here for evaluation of the following chief complaint(s):  Dizziness         ASSESSMENT/PLAN:  1. Primary hypertension  -     Blood Pressure Monitoring (MICROLIFE BPM1 BP MONITOR) KIT; 1 kit by Does not apply route daily, Disp-1 kit, R-1Normal  2. Wound of right upper extremity, initial encounter  -     mupirocin (BACTROBAN) 2 % cream; Apply 3 times daily. , Disp-15 g, R-0, Normal  3. Essential hypertension  -     hydroCHLOROthiazide (HYDRODIURIL) 12.5 MG tablet; One tab daily, Disp-90 tablet, R-0Normal  4. Dizziness    If BP is 100 than or less do not take hydrodiuril  Check BP daily with feet flat on floor, without talking and arm flat on table  Start exercising at least once a day  Apply antibiotic to left middle finger for the next week, keep nail clean  Return in about 6 weeks (around 2022) for hypotension. Subjective   SUBJECTIVE/OBJECTIVE:  HPIPresents with complaints of intermittent dizziness and middle right finger laceration that occurred over 24 hours ago    Review of Systems   Constitutional: Negative. HENT: Negative. Eyes: Negative. Respiratory: Negative. Cardiovascular: Negative. Gastrointestinal: Negative. Endocrine: Negative. Genitourinary: Negative. Skin:  Positive for wound. Allergic/Immunologic: Negative. Neurological:  Positive for dizziness. Hematological: Negative. Psychiatric/Behavioral: Negative. Vitals:    22 1624   BP: 102/60   Pulse: 87   SpO2: 97%      BP Readings from Last 3 Encounters:   22 102/60   22 130/80   22 126/78      Wt Readings from Last 3 Encounters:   22 196 lb (88.9 kg)   22 203 lb 6.4 oz (92.3 kg)   22 226 lb (102.5 kg)        Objective   Physical Exam  Constitutional:       Appearance: Normal appearance. She is normal weight. Cardiovascular:      Rate and Rhythm: Normal rate and regular rhythm. Heart sounds: Normal heart sounds. Comments: Weight loss causing drop in BP  Pulmonary:      Breath sounds: Normal breath sounds and air entry. Musculoskeletal:      Right hand: Laceration present. Arms:       Right lower leg: No edema. Left lower leg: No edema. Comments: Right 3rd digit with laceration at tip of finger. Bleeding noted, negative for purulent drainage, edges hypopigmented, due to hydrogen peroxide rinse. Tender to palpation   Skin:     General: Skin is warm and dry. Neurological:      Mental Status: She is alert. An electronic signature was used to authenticate this note.     --Garret Tanner, SHANQIUA - CNP

## 2022-11-11 ENCOUNTER — TELEPHONE (OUTPATIENT)
Dept: ADMINISTRATIVE | Age: 62
End: 2022-11-11

## 2022-11-16 NOTE — TELEPHONE ENCOUNTER
RECEIVED DENIAL:  LETTER ATTACHED. If this requires a response please respond to the pool. [address]). Please advise patient thank you.

## 2022-11-18 ENCOUNTER — PATIENT MESSAGE (OUTPATIENT)
Dept: INTERNAL MEDICINE CLINIC | Age: 62
End: 2022-11-18

## 2022-11-18 NOTE — TELEPHONE ENCOUNTER
From: Carri Narayan  To: Dr. Xiomara Mcguire  Sent: 11/18/2022 1:58 PM EST  Subject: Sample Trelergy     Hello I was wondering if you had any samples of the Trelergy I can  today    Thanks

## 2022-11-30 RX ORDER — FLUTICASONE FUROATE, UMECLIDINIUM BROMIDE AND VILANTEROL TRIFENATATE 100; 62.5; 25 UG/1; UG/1; UG/1
1 POWDER RESPIRATORY (INHALATION) DAILY
Qty: 2 EACH | Refills: 0 | Status: SHIPPED | OUTPATIENT
Start: 2022-11-30

## 2022-11-30 RX ORDER — FLUTICASONE FUROATE, UMECLIDINIUM BROMIDE AND VILANTEROL TRIFENATATE 100; 62.5; 25 UG/1; UG/1; UG/1
1 POWDER RESPIRATORY (INHALATION) DAILY
Qty: 3 EACH | Refills: 0 | COMMUNITY
Start: 2022-11-30 | End: 2022-11-30

## 2022-12-06 ENCOUNTER — PATIENT MESSAGE (OUTPATIENT)
Dept: INTERNAL MEDICINE CLINIC | Age: 62
End: 2022-12-06

## 2022-12-06 NOTE — TELEPHONE ENCOUNTER
From: Laura Narayan  To: Dr. Margot Tinsley: 12/6/2022 12:32 PM EST  Subject: Flu shot    Hello would I be able to get proof of my flu shot sent to my email?

## 2023-01-13 DIAGNOSIS — R73.03 PREDIABETES: ICD-10-CM

## 2023-01-13 DIAGNOSIS — E05.90 HYPERTHYROIDISM: ICD-10-CM

## 2023-01-13 RX ORDER — METHIMAZOLE 5 MG/1
TABLET ORAL
Qty: 15 TABLET | Refills: 5 | OUTPATIENT
Start: 2023-01-13

## 2023-01-13 RX ORDER — ORAL SEMAGLUTIDE 7 MG/1
1 TABLET ORAL DAILY
Qty: 30 TABLET | Refills: 11 | OUTPATIENT
Start: 2023-01-13

## 2023-01-13 RX ORDER — METHIMAZOLE 5 MG/1
TABLET ORAL
Qty: 15 TABLET | Refills: 1 | Status: SHIPPED | OUTPATIENT
Start: 2023-01-13

## 2023-01-13 NOTE — TELEPHONE ENCOUNTER
Recent Visits  Date Type Provider Dept   11/09/22 Office Visit Kaila Bhagat, SHANIQUA - CNP [facility]   09/28/22 Office Visit Barbara Mora MD [facility]   05/18/22 Office Visit Barbara Mora MD [facility]   12/15/21 Office Visit Barbara Mora MD [facility]   09/10/21 Office Visit Barbara Mora MD [facility]   Showing recent visits within past 540 days with a meds authorizing provider and meeting all other requirements  Future Appointments  Date Type Provider Dept   03/29/23 Appointment Barbara Mora MD [facility]   Showing future appointments within next 150 days with a meds authorizing provider and meeting all other requirements     11/9/2022

## 2023-01-13 NOTE — TELEPHONE ENCOUNTER
Medication:   Requested Prescriptions     Pending Prescriptions Disp Refills    methIMAzole (TAPAZOLE) 5 MG tablet [Pharmacy Med Name: METHIMAZOLE 5MG TABS] 15 tablet 1     Sig: TAKE 1 TABLET BY MOUTH EVERY OTHER DAY       Last Filled:      Patient Phone Number: [phone] (home)     Last appt: 9/23/2022   Next appt:     Last Thyroid:   Lab Results   Component Value Date/Time    TSH 0.08 09/18/2020 10:59 AM    FT3 3.0 09/21/2022 02:48 PM    L4LRYGG 1.18 09/21/2022 02:48 PM    T4FREE 1.4 09/21/2022 02:48 PM

## 2023-01-15 RX ORDER — ORAL SEMAGLUTIDE 7 MG/1
TABLET ORAL
Qty: 30 TABLET | Refills: 11 | Status: SHIPPED | OUTPATIENT
Start: 2023-01-15

## 2023-01-18 ENCOUNTER — PATIENT MESSAGE (OUTPATIENT)
Dept: INTERNAL MEDICINE CLINIC | Age: 63
End: 2023-01-18

## 2023-01-19 NOTE — TELEPHONE ENCOUNTER
From: Derrick Narayan  To: Dr. Stephanie Cota: 1/18/2023 6:28 PM EST  Subject: [place] S[place] I was wondering if you had any samples of the   Rybelsus the pharmacy is out of stock so I'm kind of waiting on it it's been over a week    Thanks

## 2023-01-20 RX ORDER — ORAL SEMAGLUTIDE 3 MG/1
3 TABLET ORAL DAILY
Qty: 30 TABLET | Refills: 0 | COMMUNITY
Start: 2023-01-20

## 2023-02-06 NOTE — PATIENT INSTRUCTIONS
Take fexofenadine at bedtime  Gargle with warm salt and water every night  Increase water intake  Do Breo once every day What Type Of Note Output Would You Prefer (Optional)?: Bullet Format How Severe Is Your Skin Lesion?: mild Has Your Skin Lesion Been Treated?: not been treated Is This A New Presentation, Or A Follow-Up?: Skin Lesion

## 2023-02-13 DIAGNOSIS — M51.36 DDD (DEGENERATIVE DISC DISEASE), LUMBAR: ICD-10-CM

## 2023-02-13 RX ORDER — IBUPROFEN 800 MG/1
800 TABLET ORAL EVERY 8 HOURS PRN
Qty: 90 TABLET | Refills: 5 | Status: SHIPPED | OUTPATIENT
Start: 2023-02-13

## 2023-02-13 NOTE — TELEPHONE ENCOUNTER
Recent Visits  Date Type Provider Dept   11/09/22 Office Visit SHANIQUA Chavez - CNP [facility]   09/28/22 Office Visit Yevgeniy Chamberlain MD [facility]   05/18/22 Office Visit Yevgeniy Chamberlain MD [facility]   12/15/21 Office Visit Yevgeniy Chamberlain MD [facility]   09/10/21 Office Visit Yevgeniy Chamberlain MD [facility]   Showing recent visits within past 540 days with a meds authorizing provider and meeting all other requirements  Future Appointments  Date Type Provider Dept   03/29/23 Appointment Yevgeniy Chamberlain MD [facility]   Showing future appointments within next 150 days with a meds authorizing provider and meeting all other requirements     11/9/2022

## 2023-03-24 ENCOUNTER — TELEMEDICINE (OUTPATIENT)
Dept: ENDOCRINOLOGY | Age: 63
End: 2023-03-24
Payer: COMMERCIAL

## 2023-03-24 ENCOUNTER — HOSPITAL ENCOUNTER (OUTPATIENT)
Age: 63
Discharge: HOME | End: 2023-03-24
Payer: COMMERCIAL

## 2023-03-24 DIAGNOSIS — E05.90 HYPERTHYROIDISM: Primary | ICD-10-CM

## 2023-03-24 DIAGNOSIS — E05.90 HYPERTHYROIDISM: ICD-10-CM

## 2023-03-24 LAB
T3FREE SERPL-MCNC: 2.7 PG/ML (ref 2.3–4.2)
T4 FREE SERPL-MCNC: 1.5 NG/DL (ref 0.9–1.8)
TSH SERPL DL<=0.005 MIU/L-ACNC: 0.37 UIU/ML (ref 0.27–4.2)

## 2023-03-24 PROCEDURE — 84481 FREE ASSAY (FT-3): CPT

## 2023-03-24 PROCEDURE — 84443 ASSAY THYROID STIM HORMONE: CPT

## 2023-03-24 PROCEDURE — 84439 ASSAY OF FREE THYROXINE: CPT

## 2023-03-24 PROCEDURE — 99213 OFFICE O/P EST LOW 20 MIN: CPT | Performed by: INTERNAL MEDICINE

## 2023-03-24 PROCEDURE — 36415 COLL VENOUS BLD VENIPUNCTURE: CPT

## 2023-03-24 NOTE — PROGRESS NOTES
and surgery. Saw ophthalmology. Has mild exophthalmos. Cleared for MCKENZIE as per ophthalmology. TSH 0.13---> 0.04---> 0.56---> 0.08---> 0.12---> 0.33  Free T4 of 1.4--->  1.7---> 1.3---> 1.6    Continue methimazole 5 mg every other day ( Monday -Wednesday-Friday)  Repeat labs and follow-up in 6 months. 2. Obesity. Tried contrave and sexadna. Using oral Semaglutide as per PCP.      3. HTN managed by PCP

## 2023-03-26 LAB
BACTERIA UR CULT: ABNORMAL
ORGANISM: ABNORMAL

## 2023-03-27 LAB
BACTERIA UR CULT: ABNORMAL
ORGANISM: ABNORMAL

## 2023-03-28 LAB
HPV HR 12 DNA SPEC QL NAA+PROBE: NOT DETECTED
HPV16 DNA SPEC QL NAA+PROBE: NOT DETECTED
HPV16+18+H RISK 12 DNA SPEC-IMP: NORMAL
HPV18 DNA SPEC QL NAA+PROBE: NOT DETECTED

## 2023-03-29 ENCOUNTER — OFFICE VISIT (OUTPATIENT)
Dept: INTERNAL MEDICINE CLINIC | Age: 63
End: 2023-03-29
Payer: COMMERCIAL

## 2023-03-29 VITALS
SYSTOLIC BLOOD PRESSURE: 124 MMHG | DIASTOLIC BLOOD PRESSURE: 66 MMHG | HEART RATE: 77 BPM | OXYGEN SATURATION: 100 % | BODY MASS INDEX: 34.2 KG/M2 | HEIGHT: 63 IN | WEIGHT: 193 LBS

## 2023-03-29 DIAGNOSIS — E11.9 TYPE 2 DIABETES MELLITUS WITHOUT COMPLICATION, WITHOUT LONG-TERM CURRENT USE OF INSULIN (HCC): ICD-10-CM

## 2023-03-29 DIAGNOSIS — I10 ESSENTIAL HYPERTENSION: Primary | ICD-10-CM

## 2023-03-29 DIAGNOSIS — J44.9 CHRONIC OBSTRUCTIVE PULMONARY DISEASE, UNSPECIFIED COPD TYPE (HCC): ICD-10-CM

## 2023-03-29 DIAGNOSIS — J45.40 MODERATE PERSISTENT ASTHMA WITHOUT COMPLICATION: ICD-10-CM

## 2023-03-29 PROCEDURE — 3074F SYST BP LT 130 MM HG: CPT | Performed by: INTERNAL MEDICINE

## 2023-03-29 PROCEDURE — 99214 OFFICE O/P EST MOD 30 MIN: CPT | Performed by: INTERNAL MEDICINE

## 2023-03-29 PROCEDURE — 3078F DIAST BP <80 MM HG: CPT | Performed by: INTERNAL MEDICINE

## 2023-03-29 RX ORDER — HYDROCHLOROTHIAZIDE 12.5 MG/1
12.5 TABLET ORAL DAILY
Qty: 90 TABLET | Refills: 3 | Status: SHIPPED | OUTPATIENT
Start: 2023-03-29

## 2023-03-29 ASSESSMENT — ASTHMA QUESTIONNAIRES
QUESTION_2 LAST FOUR WEEKS HOW OFTEN HAVE YOU HAD SHORTNESS OF BREATH: 5
QUESTION_3 LAST FOUR WEEKS HOW OFTEN DID YOUR ASTHMA SYMPTOMS (WHEEZING, COUGHING, SHORTNESS OF BREATH, CHEST TIGHTNESS OR PAIN) WAKE YOU UP AT NIGHT OR EARLIER THAN USUAL IN THE MORNING: 4
ACT_TOTALSCORE: 23
QUESTION_4 LAST FOUR WEEKS HOW OFTEN HAVE YOU USED YOUR RESCUE INHALER OR NEBULIZER MEDICATION (SUCH AS ALBUTEROL): 5
QUESTION_5 LAST FOUR WEEKS HOW WOULD YOU RATE YOUR ASTHMA CONTROL: 4
QUESTION_1 LAST FOUR WEEKS HOW MUCH OF THE TIME DID YOUR ASTHMA KEEP YOU FROM GETTING AS MUCH DONE AT WORK, SCHOOL OR AT HOME: 5

## 2023-03-29 ASSESSMENT — PATIENT HEALTH QUESTIONNAIRE - PHQ9
SUM OF ALL RESPONSES TO PHQ QUESTIONS 1-9: 0
2. FEELING DOWN, DEPRESSED OR HOPELESS: 0
SUM OF ALL RESPONSES TO PHQ QUESTIONS 1-9: 0
1. LITTLE INTEREST OR PLEASURE IN DOING THINGS: 0
SUM OF ALL RESPONSES TO PHQ QUESTIONS 1-9: 0
SUM OF ALL RESPONSES TO PHQ9 QUESTIONS 1 & 2: 0
SUM OF ALL RESPONSES TO PHQ QUESTIONS 1-9: 0

## 2023-03-29 NOTE — PROGRESS NOTES
Kerline Chow (:  1960) is a 63 y.o. female,Established patient, here for evaluation of the following chief complaint(s):  Follow-up (Asthma/Would like to discuss BP medication dosage)         ASSESSMENT/PLAN:  1. Essential hypertension  -     hydroCHLOROthiazide (HYDRODIURIL) 12.5 MG tablet; Take 1 tablet by mouth daily One tab daily, Disp-90 tablet, R-3Normal    2. Moderate persistent asthma without complication  Stable  - continue trelegy    3. Chronic obstructive pulmonary disease, unspecified COPD type ([address]  Stable  -  continue with trelegy    4. Type 2 diabetes mellitus without complication, without long-term current use of insulin (HCC)  -stable  -  continue with the rybelsus  Return in about 6 months (around 2023) for Annual Physical.         Subjective   SUBJECTIVE/OBJECTIVE:  HPI  Asthma:  Current treatment includes ipratropium inhalers, combination beta agonists/steroid inhalers. Using preventive medication(s) consistently: yes. Residual symptoms: productive cough with clear  sputum. Patient denies any other symptoms. She requires her rescue inhaler 0 time(s) per month. Review of Systems       Objective   Vitals:    23 1627   BP: 124/66   Site: Left Upper Arm   Position: Sitting   Cuff Size: Large Adult   Pulse: 77   SpO2: 100%   Weight: 193 lb (87.5 kg)   Height: 5' 3\" (1.6 m)      Wt Readings from Last 3 Encounters:   23 193 lb (87.5 kg)   22 196 lb (88.9 kg)   22 203 lb 6.4 oz (92.3 kg)     BP Readings from Last 3 Encounters:   23 124/66   22 102/60   22 130/80     Body mass index is 34.19 kg/m². Facility age limit for growth percentiles is 20 years. Physical Exam  Constitutional:       General: She is not in acute distress. Appearance: Normal appearance. She is not ill-appearing. HENT:      Right Ear: Tympanic membrane normal. There is no impacted cerumen.       Left Ear: Tympanic membrane normal. There is no impacted

## 2023-05-01 ENCOUNTER — TELEPHONE (OUTPATIENT)
Dept: ADMINISTRATIVE | Age: 63
End: 2023-05-01

## 2023-05-02 DIAGNOSIS — R73.03 PREDIABETES: ICD-10-CM

## 2023-05-02 DIAGNOSIS — J44.9 CHRONIC OBSTRUCTIVE PULMONARY DISEASE, UNSPECIFIED COPD TYPE (HCC): ICD-10-CM

## 2023-05-02 RX ORDER — ORAL SEMAGLUTIDE 7 MG/1
TABLET ORAL
Qty: 30 TABLET | Refills: 11 | OUTPATIENT
Start: 2023-05-02

## 2023-05-02 RX ORDER — ORAL SEMAGLUTIDE 7 MG/1
1 TABLET ORAL DAILY
Qty: 30 TABLET | Refills: 11 | Status: SHIPPED | OUTPATIENT
Start: 2023-05-02

## 2023-05-02 RX ORDER — ALBUTEROL SULFATE 90 UG/1
2 AEROSOL, METERED RESPIRATORY (INHALATION) EVERY 6 HOURS PRN
Qty: 1 EACH | Refills: 3 | Status: SHIPPED | OUTPATIENT
Start: 2023-05-02

## 2023-05-02 NOTE — TELEPHONE ENCOUNTER
Recent Visits  Date Type Provider Dept   03/29/23 Office Visit Anne Lilly MD [facility]   11/09/22 Office Visit SHANIQUA Archer - CNP [facility]   09/28/22 Office Visit Anne Lilly MD [facility]   05/18/22 Office Visit Anne Lilly MD [facility]   12/15/21 Office Visit Anne Lilly MD [facility]   Showing recent visits within past 540 days with a meds authorizing provider and meeting all other requirements  Future Appointments  Date Type Provider Dept   09/29/23 Appointment Anne Lilly MD [facility]   Showing future appointments within next 150 days with a meds authorizing provider and meeting all other requirements     3/29/2023

## 2023-05-02 NOTE — TELEPHONE ENCOUNTER
Recent Visits  Date Type Provider Dept   03/29/23 Office Visit Dave Lockwood MD [facility]   11/09/22 Office Visit Rakan Love, APRN - CNP [facility]   09/28/22 Office Visit Dave Lockwood MD [facility]   05/18/22 Office Visit Dave Lockwood MD [facility]   12/15/21 Office Visit Dave Lockwood MD [facility]   Showing recent visits within past 540 days with a meds authorizing provider and meeting all other requirements  Future Appointments  Date Type Provider Dept   09/29/23 Appointment Dave Lockwood MD [facility]   Showing future appointments within next 150 days with a meds authorizing provider and meeting all other requirements     3/29/2023

## 2023-05-08 ENCOUNTER — PATIENT MESSAGE (OUTPATIENT)
Dept: INTERNAL MEDICINE CLINIC | Age: 63
End: 2023-05-08

## 2023-05-08 RX ORDER — MULTIVIT-MIN/IRON/FOLIC ACID/K 18-600-40
1 CAPSULE ORAL DAILY
Qty: 30 CAPSULE | Refills: 11 | Status: SHIPPED | OUTPATIENT
Start: 2023-05-08

## 2023-05-08 RX ORDER — CETIRIZINE HYDROCHLORIDE 10 MG/1
10 TABLET ORAL DAILY
Qty: 30 TABLET | Refills: 3 | Status: SHIPPED | OUTPATIENT
Start: 2023-05-08 | End: 2023-06-07

## 2023-05-08 NOTE — TELEPHONE ENCOUNTER
Recent Visits  Date Type Provider Dept   03/29/23 Office Visit Chris Cervantes MD [facility]   11/09/22 Office Visit Wilfred Veloz APRN - CNP [facility]   09/28/22 Office Visit Chris Cervantes MD [facility]   05/18/22 Office Visit Chris Cervantes MD [facility]   12/15/21 Office Visit Chris Cervantes MD [facility]   Showing recent visits within past 540 days with a meds authorizing provider and meeting all other requirements  Future Appointments  Date Type Provider Dept   09/29/23 Appointment Chris Cervantes MD [facility]   Showing future appointments within next 150 days with a meds authorizing provider and meeting all other requirements     3/29/2023

## 2023-05-08 NOTE — TELEPHONE ENCOUNTER
From: Haydee Narayan  To: Dr. Douglas Castleman: 5/8/2023 4:27 PM EDT  Subject: Cetirizine 10 mg    Hello I was seeing if I can have this in a refill and also have it added to my list because I didn't see it on my choices
Recent Visits  Date Type Provider Dept   03/29/23 Office Visit Portia Grier MD [facility]   11/09/22 Office Visit Abel Todd APRN - CNP [facility]   09/28/22 Office Visit Portia Grier MD [facility]   05/18/22 Office Visit Portia Grier MD [facility]   12/15/21 Office Visit Portia Grier MD [facility]   Showing recent visits within past 540 days with a meds authorizing provider and meeting all other requirements  Future Appointments  Date Type Provider Dept   09/29/23 Appointment Portia Grier MD [facility]   Showing future appointments within next 150 days with a meds authorizing provider and meeting all other requirements     3/29/2023
no fever/no chills/no sweating/no anorexia/no weight gain/no polyuria/no polydipsia/no malaise

## 2023-07-28 ENCOUNTER — TELEPHONE (OUTPATIENT)
Dept: INTERNAL MEDICINE CLINIC | Age: 63
End: 2023-07-28

## 2023-07-28 DIAGNOSIS — J45.30 MILD PERSISTENT ASTHMA, UNSPECIFIED WHETHER COMPLICATED: ICD-10-CM

## 2023-07-28 DIAGNOSIS — I10 ESSENTIAL HYPERTENSION: ICD-10-CM

## 2023-07-31 ENCOUNTER — TELEPHONE (OUTPATIENT)
Dept: ORTHOPEDIC SURGERY | Age: 63
End: 2023-07-31

## 2023-08-08 ENCOUNTER — PATIENT MESSAGE (OUTPATIENT)
Dept: INTERNAL MEDICINE CLINIC | Age: 63
End: 2023-08-08

## 2023-08-08 DIAGNOSIS — R73.03 PREDIABETES: ICD-10-CM

## 2023-08-08 RX ORDER — ORAL SEMAGLUTIDE 7 MG/1
1 TABLET ORAL DAILY
Qty: 30 TABLET | Refills: 11 | Status: CANCELLED | OUTPATIENT
Start: 2023-08-08

## 2023-08-09 ENCOUNTER — TELEPHONE (OUTPATIENT)
Dept: INTERNAL MEDICINE CLINIC | Age: 63
End: 2023-08-09

## 2023-08-09 DIAGNOSIS — R73.03 PREDIABETES: ICD-10-CM

## 2023-08-10 NOTE — TELEPHONE ENCOUNTER
Left pt a vm to return the call to the office , Please let her know that we can provide her with a sample when I return to the office next week.

## 2023-08-14 RX ORDER — ORAL SEMAGLUTIDE 7 MG/1
1 TABLET ORAL DAILY
Qty: 30 TABLET | Refills: 0
Start: 2023-08-14

## 2023-08-15 DIAGNOSIS — E05.90 HYPERTHYROIDISM: ICD-10-CM

## 2023-08-15 RX ORDER — METHIMAZOLE 5 MG/1
TABLET ORAL
Qty: 15 TABLET | Refills: 0 | Status: SHIPPED | OUTPATIENT
Start: 2023-08-15

## 2023-08-15 NOTE — TELEPHONE ENCOUNTER
Medication:   Requested Prescriptions     Pending Prescriptions Disp Refills    methIMAzole (TAPAZOLE) 5 MG tablet 15 tablet 0     Sig: TAKE 1 TABLET BY MOUTH EVERY OTHER DAY- will need to schedule an appointment for further refills       Last Filled:      Patient Phone Number: [phone] (home)     Last appt: 3/24/2023   Next appt: Visit date not found    Last Thyroid:   Lab Results   Component Value Date/Time    TSH 0.08 09/18/2020 10:59 AM    FT3 2.7 03/24/2023 10:40 AM    Q8OAGWO 1.18 09/21/2022 02:48 PM    T4FREE 1.5 03/24/2023 10:40 AM

## 2023-08-25 ENCOUNTER — HOSPITAL ENCOUNTER (OUTPATIENT)
Dept: MAMMOGRAPHY | Age: 63
Discharge: HOME | End: 2023-08-25
Payer: COMMERCIAL

## 2023-08-25 VITALS — BODY MASS INDEX: 33.49 KG/M2 | WEIGHT: 189 LBS | HEIGHT: 63 IN

## 2023-08-25 DIAGNOSIS — Z12.31 VISIT FOR SCREENING MAMMOGRAM: ICD-10-CM

## 2023-08-25 PROCEDURE — 77063 BREAST TOMOSYNTHESIS BI: CPT

## 2023-09-11 ENCOUNTER — TELEPHONE (OUTPATIENT)
Dept: WOMENS IMAGING | Age: 63
End: 2023-09-11

## 2023-09-12 DIAGNOSIS — R92.8 ABNORMAL MAMMOGRAM OF BOTH BREASTS: Primary | ICD-10-CM

## 2023-09-15 ENCOUNTER — HOSPITAL ENCOUNTER (OUTPATIENT)
Dept: ULTRASOUND IMAGING | Age: 63
Discharge: HOME | End: 2023-09-15
Attending: INTERNAL MEDICINE
Payer: COMMERCIAL

## 2023-09-15 DIAGNOSIS — R92.8 ABNORMAL MAMMOGRAM OF BOTH BREASTS: ICD-10-CM

## 2023-09-15 PROCEDURE — 76642 ULTRASOUND BREAST LIMITED: CPT

## 2023-09-15 NOTE — PROGRESS NOTES
Dr. Beth Perla spoke to patient regarding recommendation for axilla biopsy. RN and patient discussed medical history, allergies, and current medication list. Biopsy procedure explained to patient and printed education and instructions were provided as well. Patient denies any further questions. Requesting an order via inbox for biopsy from referring MD at this time. Patient may try to make an appt with [facility] in [facility] as the location is more convenient for her. She may cancel the procedure due to the fact that she thinks it is a boil and try to \"let it go away on its own\".

## 2023-09-15 NOTE — DISCHARGE INSTRUCTIONS
[address] [phone]         ULTRASOUND BIOPSY EDUCATION    NAME:  Bill Narayan   YOB: 1960   MEDICAL RECORD NUMBER:  [id number]   TODAY'S DATE:  @ED@    What is an Ultrasound Guided Breast Biopsy? Ultrasound guided breast biopsy is a test that uses ultrasound to find an area of your breast where a tissue sample will be taken. The sample is then looked at under a microscope to check for signs of breast cancer. Why is it done? An Ultrasound biopsy is usually done to check for cancer in a lump or cyst found during a mammogram or ultrasound. Preparing for the test?     * Take your medications as prescribed    You may eat and drink fluids before the test    Take a shower the evening or morning before the biopsy. What happens before the test?  Images are taken to find the exact site to be biopsied. Your skin is washed with an alcohol prep. You will be given an injection of medication to numb your breast.   What happens during the test?     Once your breast is numb, a small cut (incision) is made. Using the imaging, the doctor will guide the needle into the biopsy area. A sample of breast tissue is taken through the needle. A small \"Clip\" or Marker is inserted into your breast to liz the biopsy site. The needle is removed and pressure put on the needle site to stop any bleeding. A bandage will be placed over the site. A post mammogram picture will be taken to document the clip placement. How long does the test take? Approximately 60 minutes. Most of the time is spent finding the area for the biopsy. What are the risks? Bleeding: You may have some bleeding which can cause bruising, swelling,    or a bleeding under your skin. Infection:  Signs of infection are redness, swelling, heat, or increasing pain    at the biopsy Site.         Sample size not

## 2023-09-15 NOTE — PROGRESS NOTES
[address] [phone]         ULTRASOUND BIOPSY EDUCATION    NAME:  Earnestine Rossi   YOB: 1960   MEDICAL RECORD NUMBER:  [id number]   TODAY'S DATE:  9/15/2023    What is an Ultrasound Guided Breast Biopsy? Ultrasound guided breast biopsy is a test that uses ultrasound to find an area of your breast where a tissue sample will be taken. The sample is then looked at under a microscope to check for signs of breast cancer. Why is it done? An Ultrasound biopsy is usually done to check for cancer in a lump or cyst found during a mammogram or ultrasound. Preparing for the test?     * Take your medications as prescribed    You may eat and drink fluids before the test    Take a shower the evening or morning before the biopsy. What happens before the test?  Images are taken to find the exact site to be biopsied. Your skin is washed with an alcohol prep. You will be given an injection of medication to numb your breast.   What happens during the test?     Once your breast is numb, a small cut (incision) is made. Using the imaging, the doctor will guide the needle into the biopsy area. A sample of breast tissue is taken through the needle. A small \"Clip\" or Marker is inserted into your breast to liz the biopsy site. The needle is removed and pressure put on the needle site to stop any bleeding. A bandage will be placed over the site. A post mammogram picture will be taken to document the clip placement. How long does the test take? Approximately 60 minutes. Most of the time is spent finding the area for the biopsy. What are the risks? Bleeding: You may have some bleeding which can cause bruising, swelling,    or a bleeding under your skin. Infection:  Signs of infection are redness, swelling, heat, or increasing pain    at the biopsy Site.         Sample size

## 2023-09-17 DIAGNOSIS — R92.8 ABNORMAL MAMMOGRAM: Primary | ICD-10-CM

## 2023-09-19 DIAGNOSIS — I10 ESSENTIAL HYPERTENSION: ICD-10-CM

## 2023-09-19 DIAGNOSIS — E05.90 HYPERTHYROIDISM: ICD-10-CM

## 2023-09-19 RX ORDER — METHIMAZOLE 5 MG/1
TABLET ORAL
Qty: 15 TABLET | Refills: 2 | Status: SHIPPED | OUTPATIENT
Start: 2023-09-19

## 2023-09-19 RX ORDER — HYDROCHLOROTHIAZIDE 25 MG/1
TABLET ORAL
Qty: 90 TABLET | Refills: 3 | OUTPATIENT
Start: 2023-09-19

## 2023-09-19 NOTE — TELEPHONE ENCOUNTER
Medication:   Requested Prescriptions     Pending Prescriptions Disp Refills    methIMAzole (TAPAZOLE) 5 MG tablet 15 tablet 2     Sig: TAKE 1 TABLET BY MOUTH EVERY OTHER DAY        Last Filled:      Patient Phone Number: [phone] (home)     Last appt: 3/24/2023   Next appt: 11/1/2023    Last OARRS:        No data to display

## 2023-09-19 NOTE — TELEPHONE ENCOUNTER
Recent Visits  Date Type Provider Dept   03/29/23 Office Visit George Serrano MD [facility]   11/09/22 Office Visit SHANIQUA Sanders - CNP [facility]   09/28/22 Office Visit George Serrano MD [facility]   05/18/22 Office Visit George Serrano MD [facility]   Showing recent visits within past 540 days with a meds authorizing provider and meeting all other requirements  Future Appointments  Date Type Provider Dept   09/29/23 Appointment George Serrano MD [facility]   Showing future appointments within next 150 days with a meds authorizing provider and meeting all other requirements     3/29/2023

## 2023-09-20 RX ORDER — HYDROCHLOROTHIAZIDE 12.5 MG/1
12.5 TABLET ORAL DAILY
Qty: 90 TABLET | Refills: 3 | Status: SHIPPED | OUTPATIENT
Start: 2023-09-20

## 2023-09-29 ENCOUNTER — HOSPITAL ENCOUNTER (OUTPATIENT)
Dept: ULTRASOUND IMAGING | Age: 63
Discharge: HOME | End: 2023-09-29
Payer: COMMERCIAL

## 2023-09-29 ENCOUNTER — OFFICE VISIT (OUTPATIENT)
Dept: INTERNAL MEDICINE CLINIC | Age: 63
End: 2023-09-29
Payer: COMMERCIAL

## 2023-09-29 ENCOUNTER — HOSPITAL ENCOUNTER (OUTPATIENT)
Dept: WOMENS IMAGING | Age: 63
Discharge: HOME | End: 2023-09-29
Payer: COMMERCIAL

## 2023-09-29 VITALS
SYSTOLIC BLOOD PRESSURE: 124 MMHG | WEIGHT: 181 LBS | HEIGHT: 63 IN | BODY MASS INDEX: 32.07 KG/M2 | HEART RATE: 78 BPM | OXYGEN SATURATION: 96 % | DIASTOLIC BLOOD PRESSURE: 62 MMHG

## 2023-09-29 DIAGNOSIS — N64.59 ABNORMAL BREAST FINDING: Primary | ICD-10-CM

## 2023-09-29 DIAGNOSIS — Z23 NEEDS FLU SHOT: ICD-10-CM

## 2023-09-29 DIAGNOSIS — R73.9 HYPERGLYCEMIA: ICD-10-CM

## 2023-09-29 DIAGNOSIS — I10 ESSENTIAL HYPERTENSION: ICD-10-CM

## 2023-09-29 DIAGNOSIS — Z00.00 WELL ADULT EXAM: Primary | ICD-10-CM

## 2023-09-29 DIAGNOSIS — Z98.890 STATUS POST RIGHT BREAST BIOPSY: ICD-10-CM

## 2023-09-29 DIAGNOSIS — J45.40 MODERATE PERSISTENT ASTHMA WITHOUT COMPLICATION: ICD-10-CM

## 2023-09-29 DIAGNOSIS — R92.8 ABNORMAL MAMMOGRAM: ICD-10-CM

## 2023-09-29 LAB
ALBUMIN SERPL-MCNC: 4.3 G/DL (ref 3.4–5)
ALBUMIN/GLOB SERPL: 1.6 {RATIO} (ref 1.1–2.2)
ALP SERPL-CCNC: 69 U/L (ref 40–129)
ALT SERPL-CCNC: 77 U/L (ref 10–40)
ANION GAP SERPL CALCULATED.3IONS-SCNC: 10 MMOL/L (ref 3–16)
AST SERPL-CCNC: 88 U/L (ref 15–37)
BILIRUB SERPL-MCNC: 1.1 MG/DL (ref 0–1)
BUN SERPL-MCNC: 10 MG/DL (ref 7–20)
CALCIUM SERPL-MCNC: 8.8 MG/DL (ref 8.3–10.6)
CHLORIDE SERPL-SCNC: 103 MMOL/L (ref 99–110)
CHOLEST SERPL-MCNC: 143 MG/DL (ref 0–199)
CO2 SERPL-SCNC: 28 MMOL/L (ref 21–32)
CREAT SERPL-MCNC: 1.1 MG/DL (ref 0.6–1.2)
CREAT UR-MCNC: 250.8 MG/DL (ref 28–259)
GFR SERPLBLD CREATININE-BSD FMLA CKD-EPI: 57 ML/MIN/{1.73_M2}
GLUCOSE SERPL-MCNC: 102 MG/DL (ref 70–99)
HDLC SERPL-MCNC: 51 MG/DL (ref 40–60)
LDLC SERPL CALC-MCNC: 70 MG/DL
MICROALBUMIN UR DL<=1MG/L-MCNC: 12.7 MG/DL
MICROALBUMIN/CREAT UR: 50.6 MG/G (ref 0–30)
POTASSIUM SERPL-SCNC: 4.3 MMOL/L (ref 3.5–5.1)
PROT SERPL-MCNC: 7 G/DL (ref 6.4–8.2)
SODIUM SERPL-SCNC: 141 MMOL/L (ref 136–145)
TRIGL SERPL-MCNC: 111 MG/DL (ref 0–150)
VLDLC SERPL CALC-MCNC: 22 MG/DL

## 2023-09-29 PROCEDURE — 90694 VACC AIIV4 NO PRSRV 0.5ML IM: CPT | Performed by: INTERNAL MEDICINE

## 2023-09-29 PROCEDURE — 77065 DX MAMMO INCL CAD UNI: CPT

## 2023-09-29 PROCEDURE — 90471 IMMUNIZATION ADMIN: CPT | Performed by: INTERNAL MEDICINE

## 2023-09-29 PROCEDURE — 19083 BX BREAST 1ST LESION US IMAG: CPT

## 2023-09-29 PROCEDURE — 3078F DIAST BP <80 MM HG: CPT | Performed by: INTERNAL MEDICINE

## 2023-09-29 PROCEDURE — 99396 PREV VISIT EST AGE 40-64: CPT | Performed by: INTERNAL MEDICINE

## 2023-09-29 PROCEDURE — 3074F SYST BP LT 130 MM HG: CPT | Performed by: INTERNAL MEDICINE

## 2023-09-29 RX ORDER — ORAL SEMAGLUTIDE 3 MG/1
1 TABLET ORAL DAILY
Qty: 30 TABLET | Refills: 0 | Status: SHIPPED | COMMUNITY
Start: 2023-09-29

## 2023-09-29 SDOH — ECONOMIC STABILITY: FOOD INSECURITY: WITHIN THE PAST 12 MONTHS, THE FOOD YOU BOUGHT JUST DIDN'T LAST AND YOU DIDN'T HAVE MONEY TO GET MORE.: NEVER TRUE

## 2023-09-29 SDOH — ECONOMIC STABILITY: HOUSING INSECURITY
IN THE LAST 12 MONTHS, WAS THERE A TIME WHEN YOU DID NOT HAVE A STEADY PLACE TO SLEEP OR SLEPT IN A SHELTER (INCLUDING NOW)?: NO

## 2023-09-29 SDOH — ECONOMIC STABILITY: FOOD INSECURITY: WITHIN THE PAST 12 MONTHS, YOU WORRIED THAT YOUR FOOD WOULD RUN OUT BEFORE YOU GOT MONEY TO BUY MORE.: NEVER TRUE

## 2023-09-29 SDOH — ECONOMIC STABILITY: INCOME INSECURITY: HOW HARD IS IT FOR YOU TO PAY FOR THE VERY BASICS LIKE FOOD, HOUSING, MEDICAL CARE, AND HEATING?: NOT HARD AT ALL

## 2023-09-29 ASSESSMENT — ENCOUNTER SYMPTOMS
EYE REDNESS: 0
EYE PAIN: 0
CHOKING: 0
COUGH: 0
FACIAL SWELLING: 0
ABDOMINAL DISTENTION: 0
ABDOMINAL PAIN: 0

## 2023-09-29 ASSESSMENT — PATIENT HEALTH QUESTIONNAIRE - PHQ9
6. FEELING BAD ABOUT YOURSELF - OR THAT YOU ARE A FAILURE OR HAVE LET YOURSELF OR YOUR FAMILY DOWN: 0
7. TROUBLE CONCENTRATING ON THINGS, SUCH AS READING THE NEWSPAPER OR WATCHING TELEVISION: 0
2. FEELING DOWN, DEPRESSED OR HOPELESS: 0
1. LITTLE INTEREST OR PLEASURE IN DOING THINGS: 0
SUM OF ALL RESPONSES TO PHQ9 QUESTIONS 1 & 2: 0
3. TROUBLE FALLING OR STAYING ASLEEP: 0
4. FEELING TIRED OR HAVING LITTLE ENERGY: 1
9. THOUGHTS THAT YOU WOULD BE BETTER OFF DEAD, OR OF HURTING YOURSELF: 0
SUM OF ALL RESPONSES TO PHQ QUESTIONS 1-9: 2
5. POOR APPETITE OR OVEREATING: 1
SUM OF ALL RESPONSES TO PHQ QUESTIONS 1-9: 2
SUM OF ALL RESPONSES TO PHQ QUESTIONS 1-9: 2
8. MOVING OR SPEAKING SO SLOWLY THAT OTHER PEOPLE COULD HAVE NOTICED. OR THE OPPOSITE, BEING SO FIGETY OR RESTLESS THAT YOU HAVE BEEN MOVING AROUND A LOT MORE THAN USUAL: 0
SUM OF ALL RESPONSES TO PHQ QUESTIONS 1-9: 2
10. IF YOU CHECKED OFF ANY PROBLEMS, HOW DIFFICULT HAVE THESE PROBLEMS MADE IT FOR YOU TO DO YOUR WORK, TAKE CARE OF THINGS AT HOME, OR GET ALONG WITH OTHER PEOPLE: 0

## 2023-09-29 ASSESSMENT — ANXIETY QUESTIONNAIRES
4. TROUBLE RELAXING: 0
2. NOT BEING ABLE TO STOP OR CONTROL WORRYING: 0
1. FEELING NERVOUS, ANXIOUS, OR ON EDGE: 0
6. BECOMING EASILY ANNOYED OR IRRITABLE: 0
7. FEELING AFRAID AS IF SOMETHING AWFUL MIGHT HAPPEN: 0
3. WORRYING TOO MUCH ABOUT DIFFERENT THINGS: 0
GAD7 TOTAL SCORE: 0
5. BEING SO RESTLESS THAT IT IS HARD TO SIT STILL: 0
IF YOU CHECKED OFF ANY PROBLEMS ON THIS QUESTIONNAIRE, HOW DIFFICULT HAVE THESE PROBLEMS MADE IT FOR YOU TO DO YOUR WORK, TAKE CARE OF THINGS AT HOME, OR GET ALONG WITH OTHER PEOPLE: NOT DIFFICULT AT ALL

## 2023-09-29 ASSESSMENT — PAIN SCALES - GENERAL
PAINLEVEL_OUTOF10: 0
PAINLEVEL_OUTOF10: 0

## 2023-09-29 NOTE — PROGRESS NOTES
2023    Candice Narayan (:  1960) is a 63 y.o. female, here for a preventive medicine evaluation. Patient taking rybelsus for weight loss. She is doing well. She is starting to do more exercise    Patient Active Problem List   Diagnosis    Hyperthyroidism    Graves' disease    Asthma    Restrictive lung disease    COPD (chronic obstructive pulmonary disease) ([address])    Seasonal allergic rhinitis due to pollen    PND (post-nasal drip)    Phlegm in throat    Sleep apnea in adult    Non morbid obesity, unspecified obesity type    Obesity (BMI 30-39.9)    Episcleritis of right eye    Type 2 diabetes mellitus     Patient does not text and drive    Patient wears seatbelt when driving        Review of Systems   Constitutional:  Negative for chills and diaphoresis. HENT:  Negative for ear pain and facial swelling. Eyes:  Negative for pain and redness. Respiratory:  Negative for cough and choking. Cardiovascular:  Negative for chest pain and leg swelling. Gastrointestinal:  Negative for abdominal distention and abdominal pain. Endocrine: Negative for heat intolerance. Genitourinary:  Negative for dysuria and enuresis. Prior to Visit Medications    Medication Sig Taking?  Authorizing Provider   Semaglutide (RYBELSUS) 3 MG TABS Take 1 tablet by mouth daily Yes Jonny Avilez MD   hydroCHLOROthiazide (HYDRODIURIL) 12.5 MG tablet Take 1 tablet by mouth daily One tab daily Yes Jonny Avilez MD   fluticasone-umeclidin-vilant (TRELEGY ELLIPTA) 610-38.0-99 MCG/ACT AEPB inhaler Inhale 1 puff into the lungs daily Yes Jonny Avilez MD   methIMAzole (TAPAZOLE) 5 MG tablet TAKE 1 TABLET BY MOUTH EVERY OTHER DAY Yes Hoa Vega MD   Cholecalciferol (VITAMIN D) 50 MCG (2000 UT) CAPS capsule Take 1 capsule by mouth daily Yes Jonny Avilez MD   albuterol sulfate HFA (PROVENTIL;VENTOLIN;PROAIR) 108 (90 Base) MCG/ACT inhaler Inhale 2 puffs into the lungs every 6

## 2023-09-29 NOTE — DISCHARGE INSTRUCTIONS
[facility] and Discharge [address] [place] [address]   [address]  Telephone: [phone]    NAME:  Cj Jennings OF BIRTH:  1960  GENDER: female  MEDICAL RECORD NUMBER:  [id number]  TODAY'S DATE:  @ED@    Discharge Instructions [facility]:    Post Breast Biopsy Instructions    [x] Place the provided cold pack inside your bra on top of the dressing for at least 3 hours. You may re-freeze it and use it again later if you have any discomfort. [x] You may take Tylenol (Acetaminophen) the day of your biopsy for any discomfort. [x] Watch for excessive bleeding. If bleeding occurs apply pressure to site. Watch for signs of infection, increased pain, redness, swelling and heat. If this occurs call your physician. [x] Do not participate in any strenuous exercise for 48 hours after your biopsy, such as tennis, aerobics, weight lifting and household activities that involve use of your affected   arm. [x] You may remove your outer dressing in 24 hours and you may shower. \"Steri-strips\" tape will stay on for a few days longer then can be removed. [facility] Information:   Should you experience any significant changes in your health or have questions about your care, please contact:  [address] [place] [address],[address]   Monday-Friday 8:00 am - 4:30 pm.  If you need help with your care outside these hours and cannot wait until we are again available,  contact your Physician or go to the hospital emergency.        Electronically signed by Jaciel Dunn RN on 9/29/2023 at 1:15 PM

## 2023-09-29 NOTE — PROGRESS NOTES
Patient is at the hospital now for breast procedure. It requires the flow cytometry as well.     *order palced in EPIC

## 2023-09-29 NOTE — PROGRESS NOTES
Breast Biopsy Nursing Note    NAME:  Lashonda Serrano OF BIRTH:  1960 GENDER: female  MEDICAL RECORD NUMBER:  [id number]  DATE:  9/29/2023    Breast Biopsy completed by . Expiration date site marker checked immediately prior to use. Package intact prior to use and no damage noted. Site marker was removed from protective sterile packaging by physician. Site marker was placed by physician into right     breast/s. Hemostasis achieved via site pressure; Biopsy site cleansed with alcohol  Steri-strips applied along with small amount of bacitracin-neomycin polymixin then Coverderm   Breast Biopsy tissue was placed in proper container/s and labeled. Breast Biopsy tissue was taken to Pathology for evaluation. Procedural Pain:  0  / 10     Post Procedural Pain:  0 / 10     Procedure well tolerated. Pt stable and alert and oriented x 3 upon discharge. Ice pack placed over biopsy site. Pt given post-biopsy education and all questions answered. Pt has NN contact info.        Electronically signed by Damion Cook RN on 9/29/2023 at 2:22 PM

## 2023-09-30 LAB
EST. AVERAGE GLUCOSE BLD GHB EST-MCNC: 122.6 MG/DL
HBA1C MFR BLD: 5.9 %

## 2023-10-03 ENCOUNTER — CASE MANAGEMENT (OUTPATIENT)
Dept: WOMENS IMAGING | Age: 63
End: 2023-10-03

## 2023-10-03 NOTE — PROGRESS NOTES
Spoke with pt concerning biopsy results and follow up. Pt verbalized understanding and had no questions.

## 2023-10-31 ENCOUNTER — PATIENT MESSAGE (OUTPATIENT)
Dept: INTERNAL MEDICINE CLINIC | Age: 63
End: 2023-10-31

## 2023-10-31 NOTE — TELEPHONE ENCOUNTER
From: Chinmay Narayan  To: Dr. Melissa Jacinto  Sent: 10/31/2023 9:59 AM EDT  Subject: Tired,shortness of breath    Good morning, I was wondering what is causing me to have shortness of breath always tired. If you have any openings for an appointment on tomorrow or Monday? I can take it.

## 2023-11-01 ENCOUNTER — OFFICE VISIT (OUTPATIENT)
Dept: ENDOCRINOLOGY | Age: 63
End: 2023-11-01
Payer: COMMERCIAL

## 2023-11-01 ENCOUNTER — OFFICE VISIT (OUTPATIENT)
Dept: INTERNAL MEDICINE CLINIC | Age: 63
End: 2023-11-01
Payer: COMMERCIAL

## 2023-11-01 VITALS
OXYGEN SATURATION: 97 % | DIASTOLIC BLOOD PRESSURE: 60 MMHG | WEIGHT: 172.2 LBS | TEMPERATURE: 97.6 F | RESPIRATION RATE: 18 BRPM | HEART RATE: 69 BPM | HEIGHT: 63 IN | BODY MASS INDEX: 30.51 KG/M2 | SYSTOLIC BLOOD PRESSURE: 120 MMHG

## 2023-11-01 VITALS
HEIGHT: 63 IN | WEIGHT: 176.2 LBS | BODY MASS INDEX: 31.22 KG/M2 | OXYGEN SATURATION: 97 % | TEMPERATURE: 98 F | RESPIRATION RATE: 14 BRPM | DIASTOLIC BLOOD PRESSURE: 67 MMHG | HEART RATE: 81 BPM | SYSTOLIC BLOOD PRESSURE: 123 MMHG

## 2023-11-01 DIAGNOSIS — R00.2 PALPITATIONS: Primary | ICD-10-CM

## 2023-11-01 DIAGNOSIS — R42 DIZZINESS: ICD-10-CM

## 2023-11-01 DIAGNOSIS — R53.83 FATIGUE, UNSPECIFIED TYPE: ICD-10-CM

## 2023-11-01 DIAGNOSIS — E05.90 HYPERTHYROIDISM: ICD-10-CM

## 2023-11-01 LAB
ALBUMIN SERPL-MCNC: 4.4 G/DL (ref 3.4–5)
ALBUMIN/GLOB SERPL: 1.6 {RATIO} (ref 1.1–2.2)
ALP SERPL-CCNC: 65 U/L (ref 40–129)
ALT SERPL-CCNC: 39 U/L (ref 10–40)
ANION GAP SERPL CALCULATED.3IONS-SCNC: 10 MMOL/L (ref 3–16)
ANISOCYTOSIS BLD QL SMEAR: ABNORMAL
AST SERPL-CCNC: 77 U/L (ref 15–37)
BASOPHILS # BLD: 0 K/UL (ref 0–0.2)
BASOPHILS NFR BLD: 0 %
BILIRUB SERPL-MCNC: 1.6 MG/DL (ref 0–1)
BILIRUB UR QL STRIP.AUTO: ABNORMAL
BUN SERPL-MCNC: 14 MG/DL (ref 7–20)
CALCIUM SERPL-MCNC: 9.5 MG/DL (ref 8.3–10.6)
CHLORIDE SERPL-SCNC: 97 MMOL/L (ref 99–110)
CLARITY UR: ABNORMAL
CO2 SERPL-SCNC: 29 MMOL/L (ref 21–32)
COLOR UR: ABNORMAL
CREAT SERPL-MCNC: 1 MG/DL (ref 0.6–1.2)
CREAT UR-MCNC: 295.9 MG/DL (ref 28–259)
CRP SERPL-MCNC: 15.5 MG/L (ref 0–5.1)
DEPRECATED RDW RBC AUTO: 15.9 % (ref 12.4–15.4)
EOSINOPHIL # BLD: 0.1 K/UL (ref 0–0.6)
EOSINOPHIL NFR BLD: 2 %
GFR SERPLBLD CREATININE-BSD FMLA CKD-EPI: >60 ML/MIN/{1.73_M2}
GLUCOSE SERPL-MCNC: 94 MG/DL (ref 70–99)
GLUCOSE UR STRIP.AUTO-MCNC: NEGATIVE MG/DL
HCT VFR BLD AUTO: 14.2 % (ref 36–48)
HGB BLD-MCNC: 5 G/DL (ref 12–16)
HGB UR QL STRIP.AUTO: ABNORMAL
HYPERCHROMIA BLD QL SMEAR: ABNORMAL
KETONES UR STRIP.AUTO-MCNC: ABNORMAL MG/DL
LEUKOCYTE ESTERASE UR QL STRIP.AUTO: ABNORMAL
LYMPHOCYTES # BLD: 2.1 K/UL (ref 1–5.1)
LYMPHOCYTES NFR BLD: 65 %
MACROCYTES BLD QL SMEAR: ABNORMAL
MCH RBC QN AUTO: 45.5 PG (ref 26–34)
MCHC RBC AUTO-ENTMCNC: 35.3 G/DL (ref 31–36)
MCV RBC AUTO: 129.1 FL (ref 80–100)
METAMYELOCYTES NFR BLD MANUAL: 1 %
MICROALBUMIN UR DL<=1MG/L-MCNC: 20.2 MG/DL
MICROALBUMIN/CREAT UR: 68.3 MG/G (ref 0–30)
MONOCYTES # BLD: 0 K/UL (ref 0–1.3)
MONOCYTES NFR BLD: 1 %
MONONUC CELLS NFR BLD MANUAL: 1 %
MYELOCYTES NFR BLD MANUAL: 2 %
NEUTROPHILS # BLD: 1 K/UL (ref 1.7–7.7)
NEUTROPHILS NFR BLD: 28 %
NITRITE UR QL STRIP.AUTO: POSITIVE
NRBC BLD-RTO: 4 /100 WBC
OVALOCYTES BLD QL SMEAR: ABNORMAL
PATH INTERP BLD-IMP: YES
PH UR STRIP.AUTO: 5.5 [PH] (ref 5–8)
PLATELET # BLD AUTO: 78 K/UL (ref 135–450)
PLATELET BLD QL SMEAR: ABNORMAL
PMV BLD AUTO: 10.7 FL (ref 5–10.5)
POIKILOCYTOSIS BLD QL SMEAR: ABNORMAL
POLYCHROMASIA BLD QL SMEAR: ABNORMAL
POTASSIUM SERPL-SCNC: 4 MMOL/L (ref 3.5–5.1)
PROT SERPL-MCNC: 7.2 G/DL (ref 6.4–8.2)
PROT UR STRIP.AUTO-MCNC: 100 MG/DL
RBC # BLD AUTO: 1.1 M/UL (ref 4–5.2)
SCHISTOCYTES BLD QL SMEAR: ABNORMAL
SODIUM SERPL-SCNC: 136 MMOL/L (ref 136–145)
SP GR UR STRIP.AUTO: 1.02 (ref 1–1.03)
T3FREE SERPL-MCNC: 2.5 PG/ML (ref 2.3–4.2)
T4 FREE SERPL-MCNC: 1.4 NG/DL (ref 0.9–1.8)
TSH SERPL DL<=0.005 MIU/L-ACNC: 0.6 UIU/ML (ref 0.27–4.2)
UA DIPSTICK W REFLEX MICRO PNL UR: YES
URN SPEC COLLECT METH UR: ABNORMAL
UROBILINOGEN UR STRIP-ACNC: 4 E.U./DL
WBC # BLD AUTO: 3.2 K/UL (ref 4–11)

## 2023-11-01 PROCEDURE — 93000 ELECTROCARDIOGRAM COMPLETE: CPT | Performed by: INTERNAL MEDICINE

## 2023-11-01 PROCEDURE — 99214 OFFICE O/P EST MOD 30 MIN: CPT | Performed by: INTERNAL MEDICINE

## 2023-11-01 RX ORDER — METHIMAZOLE 5 MG/1
TABLET ORAL
Qty: 45 TABLET | Refills: 2 | Status: SHIPPED | OUTPATIENT
Start: 2023-11-01

## 2023-11-01 RX ORDER — METHIMAZOLE 5 MG/1
TABLET ORAL
Qty: 45 TABLET | Refills: 2 | Status: SHIPPED | OUTPATIENT
Start: 2023-11-01 | End: 2023-11-01 | Stop reason: SDUPTHER

## 2023-11-01 RX ORDER — CETIRIZINE HYDROCHLORIDE 10 MG/1
10 TABLET ORAL DAILY
Qty: 30 TABLET | Refills: 5 | Status: SHIPPED | OUTPATIENT
Start: 2023-11-01 | End: 2024-04-29

## 2023-11-01 ASSESSMENT — ANXIETY QUESTIONNAIRES
IF YOU CHECKED OFF ANY PROBLEMS ON THIS QUESTIONNAIRE, HOW DIFFICULT HAVE THESE PROBLEMS MADE IT FOR YOU TO DO YOUR WORK, TAKE CARE OF THINGS AT HOME, OR GET ALONG WITH OTHER PEOPLE: NOT DIFFICULT AT ALL
5. BEING SO RESTLESS THAT IT IS HARD TO SIT STILL: 0
7. FEELING AFRAID AS IF SOMETHING AWFUL MIGHT HAPPEN: 0
3. WORRYING TOO MUCH ABOUT DIFFERENT THINGS: 0
4. TROUBLE RELAXING: 0
1. FEELING NERVOUS, ANXIOUS, OR ON EDGE: 0
6. BECOMING EASILY ANNOYED OR IRRITABLE: 0
GAD7 TOTAL SCORE: 0
2. NOT BEING ABLE TO STOP OR CONTROL WORRYING: 0

## 2023-11-01 ASSESSMENT — PATIENT HEALTH QUESTIONNAIRE - PHQ9
2. FEELING DOWN, DEPRESSED OR HOPELESS: 0
SUM OF ALL RESPONSES TO PHQ QUESTIONS 1-9: 0
SUM OF ALL RESPONSES TO PHQ9 QUESTIONS 1 & 2: 0
SUM OF ALL RESPONSES TO PHQ QUESTIONS 1-9: 0
1. LITTLE INTEREST OR PLEASURE IN DOING THINGS: 0
SUM OF ALL RESPONSES TO PHQ QUESTIONS 1-9: 0
SUM OF ALL RESPONSES TO PHQ QUESTIONS 1-9: 0

## 2023-11-01 NOTE — PROGRESS NOTES
KARISSA Carreon is a 66 y.o. female who is here for a follow-up for management of hyperthyroidism. Interim:    Heart fluttering  Had missed tapazole that week    PCP  Dalton Zimmer MD     Has seen Dr. Rolly Chun    She has a PMH of Asthma, hypertension and chronic back pain. Patient was found to have a low TSH ( <0.01 ) and high FT4 (3.8 ) on labs done on 06/11/14. Repeat labs confirmed hyperthyroidism. Thyroid uptake and scan was consistent with Graves disease. No FH of thyroid disease. She was started on tapazole 10 mg BID. She developed a rash and stopped it for 2-3 days. She restarted it at 10 mg daily. Has to take hydroxyzine with it to control itching. Took  tapazole  5 mg daily since 04/16---> 06/17    Resumed in 2018. Now on Methimazole 5 mg every other day. She is experiencing occasional  increased sweating    No palpitations, tremors. No diarrhea      Weight has been stable. Reports left eye side issue, seen opthamology  Was not told due to Graves' disease  Has used steroid drops    Thyroid US 06/14 INDICATION: Hyperthyroidism. COMPARISON: None. FINDINGS: Sonography of the thyroid gland was performed. The entire thyroid gland is enlarged and heterogeneous in echo texture. The right lobe measures 5.6 x 2.1 x 3.1 cm. The left lobe measures 5.8 x 2.2 x 2.8 cm. There is no discrete right thyroid nodules identified. Within the left thyroid lobe, scattered cysts are noted with no discrete nodule. THYROID UPTAKE SCAN. JUL 02, 2014 08:31:52 AM: INDICATION: \"Hyperthyroidism. \" COMPARISON: Ultrasound thyroid, 6/25/2014. FINDINGS: 24 hours following the administration of the Iodine-123 capsule, the uptake is calculated as 69.9%. The uptake pattern is symmetrical. The thyroid gland is globally enlarged, consistent with the ultrasound.       Worked  at our office, currently working at home    Orders Only on 09/29/2023   Component Date Value Ref Range Status

## 2023-11-02 ENCOUNTER — HOSPITAL ENCOUNTER (INPATIENT)
Age: 63
LOS: 1 days | Discharge: HOME | DRG: 809 | End: 2023-11-03
Attending: EMERGENCY MEDICINE | Admitting: INTERNAL MEDICINE
Payer: COMMERCIAL

## 2023-11-02 ENCOUNTER — TELEPHONE (OUTPATIENT)
Dept: INTERNAL MEDICINE CLINIC | Age: 63
End: 2023-11-02

## 2023-11-02 DIAGNOSIS — Z51.89 ENCOUNTER FOR BLOOD TRANSFUSION: ICD-10-CM

## 2023-11-02 DIAGNOSIS — D64.9 ANEMIA, UNSPECIFIED TYPE: ICD-10-CM

## 2023-11-02 DIAGNOSIS — D64.9 SYMPTOMATIC ANEMIA: Primary | ICD-10-CM

## 2023-11-02 LAB
ABO + RH BLD: NORMAL
ALBUMIN SERPL-MCNC: 4.4 G/DL (ref 3.4–5)
ALP SERPL-CCNC: 57 U/L (ref 40–129)
ALT SERPL-CCNC: 40 U/L (ref 10–40)
ANION GAP SERPL CALCULATED.3IONS-SCNC: 14 MMOL/L (ref 3–16)
ANISOCYTOSIS BLD QL SMEAR: ABNORMAL
APTT BLD: 27.2 SEC (ref 22.7–35.9)
AST SERPL-CCNC: 93 U/L (ref 15–37)
BACTERIA URNS QL MICRO: ABNORMAL /HPF
BACTERIA URNS QL MICRO: ABNORMAL /HPF
BASOPHILS # BLD: 0 K/UL (ref 0–0.2)
BASOPHILS NFR BLD: 0 %
BILIRUB DIRECT SERPL-MCNC: 0.3 MG/DL (ref 0–0.3)
BILIRUB INDIRECT SERPL-MCNC: 1.3 MG/DL (ref 0–1)
BILIRUB SERPL-MCNC: 1.6 MG/DL (ref 0–1)
BILIRUB UR QL STRIP.AUTO: NEGATIVE
BLD GP AB SCN SERPL QL: NORMAL
BLOOD BANK DISPENSE STATUS: NORMAL
BLOOD BANK PRODUCT CODE: NORMAL
BPU ID: NORMAL
BUN SERPL-MCNC: 12 MG/DL (ref 7–20)
CALCIUM SERPL-MCNC: 9.4 MG/DL (ref 8.3–10.6)
CHLORIDE SERPL-SCNC: 96 MMOL/L (ref 99–110)
CLARITY UR: ABNORMAL
CO2 SERPL-SCNC: 25 MMOL/L (ref 21–32)
COARSE GRAN CASTS #/AREA URNS LPF: ABNORMAL /LPF (ref 0–2)
COLOR UR: YELLOW
CREAT SERPL-MCNC: 0.9 MG/DL (ref 0.6–1.2)
DACRYOCYTES BLD QL SMEAR: ABNORMAL
DAT POLY-SP REAG RBC QL: NORMAL
DEPRECATED RDW RBC AUTO: 16.3 % (ref 12.4–15.4)
DESCRIPTION BLOOD BANK: NORMAL
EKG ATRIAL RATE: 84 BPM
EKG DIAGNOSIS: NORMAL
EKG P AXIS: 47 DEGREES
EKG P-R INTERVAL: 168 MS
EKG Q-T INTERVAL: 392 MS
EKG QRS DURATION: 82 MS
EKG QTC CALCULATION (BAZETT): 463 MS
EKG R AXIS: 11 DEGREES
EKG T AXIS: 25 DEGREES
EKG VENTRICULAR RATE: 84 BPM
EOSINOPHIL # BLD: 0.1 K/UL (ref 0–0.6)
EOSINOPHIL NFR BLD: 3 %
EPI CELLS #/AREA URNS AUTO: 1 /HPF (ref 0–5)
EPI CELLS #/AREA URNS HPF: ABNORMAL /HPF (ref 0–5)
ERYTHROCYTE [SEDIMENTATION RATE] IN BLOOD BY WESTERGREN METHOD: 24 MM/HR (ref 0–30)
FERRITIN SERPL IA-MCNC: 563.8 NG/ML (ref 15–150)
FINE GRAN CASTS #/AREA URNS HPF: ABNORMAL /LPF (ref 0–2)
GFR SERPLBLD CREATININE-BSD FMLA CKD-EPI: >60 ML/MIN/{1.73_M2}
GLUCOSE SERPL-MCNC: 136 MG/DL (ref 70–99)
GLUCOSE UR STRIP.AUTO-MCNC: NEGATIVE MG/DL
HAPTOGLOB SERPL-MCNC: <10 MG/DL (ref 30–200)
HCT VFR BLD AUTO: 14.6 % (ref 36–48)
HCT VFR BLD AUTO: 24.4 % (ref 36–48)
HGB BLD-MCNC: 5 G/DL (ref 12–16)
HGB BLD-MCNC: 8.3 G/DL (ref 12–16)
HGB UR QL STRIP.AUTO: ABNORMAL
HYALINE CASTS #/AREA URNS AUTO: 0 /LPF (ref 0–8)
HYPOCHROMIA BLD QL SMEAR: ABNORMAL
INR PPP: 1.22 (ref 0.84–1.16)
IRON SATN MFR SERPL: 67 % (ref 15–50)
IRON SERPL-MCNC: 222 UG/DL (ref 37–145)
KETONES UR STRIP.AUTO-MCNC: NEGATIVE MG/DL
LDH SERPL L TO P-CCNC: >2500 U/L (ref 100–190)
LEUKOCYTE ESTERASE UR QL STRIP.AUTO: ABNORMAL
LYMPHOCYTES # BLD: 3.4 K/UL (ref 1–5.1)
LYMPHOCYTES NFR BLD: 77 %
MACROCYTES BLD QL SMEAR: ABNORMAL
MCH RBC QN AUTO: 44.1 PG (ref 26–34)
MCHC RBC AUTO-ENTMCNC: 34 G/DL (ref 31–36)
MCV RBC AUTO: 129.6 FL (ref 80–100)
MONOCYTES # BLD: 0 K/UL (ref 0–1.3)
MONOCYTES NFR BLD: 1 %
NEUTROPHILS # BLD: 0.8 K/UL (ref 1.7–7.7)
NEUTROPHILS NFR BLD: 19 %
NITRITE UR QL STRIP.AUTO: NEGATIVE
NRBC BLD-RTO: 2 /100 WBC
OVALOCYTES BLD QL SMEAR: ABNORMAL
PATH INTERP BLD-IMP: NORMAL
PATH INTERP BLD-IMP: YES
PH UR STRIP.AUTO: 6 [PH] (ref 5–8)
PLATELET # BLD AUTO: 96 K/UL (ref 135–450)
PLATELET BLD QL SMEAR: ABNORMAL
PMV BLD AUTO: 8.8 FL (ref 5–10.5)
POLYCHROMASIA BLD QL SMEAR: ABNORMAL
POTASSIUM SERPL-SCNC: 3.9 MMOL/L (ref 3.5–5.1)
PROT SERPL-MCNC: 7.8 G/DL (ref 6.4–8.2)
PROT UR STRIP.AUTO-MCNC: 100 MG/DL
PROTHROMBIN TIME: 15.4 SEC (ref 11.5–14.8)
RBC # BLD AUTO: 1.13 M/UL (ref 4–5.2)
RBC #/AREA URNS HPF: ABNORMAL /HPF (ref 0–4)
RBC CLUMPS #/AREA URNS AUTO: 1 /HPF (ref 0–4)
REASON FOR REJECTION: NORMAL
REJECTED TEST: NORMAL
SLIDE REVIEW: ABNORMAL
SODIUM SERPL-SCNC: 135 MMOL/L (ref 136–145)
SP GR UR STRIP.AUTO: 1.01 (ref 1–1.03)
TIBC SERPL-MCNC: 333 UG/DL (ref 260–445)
TROPONIN, HIGH SENSITIVITY: 12 NG/L (ref 0–14)
TROPONIN, HIGH SENSITIVITY: 17 NG/L (ref 0–14)
TSH SERPL DL<=0.005 MIU/L-ACNC: 0.89 UIU/ML (ref 0.27–4.2)
UA COMPLETE W REFLEX CULTURE PNL UR: YES
UA DIPSTICK W REFLEX MICRO PNL UR: YES
URN SPEC COLLECT METH UR: ABNORMAL
UROBILINOGEN UR STRIP-ACNC: 2 E.U./DL
WBC # BLD AUTO: 4.4 K/UL (ref 4–11)
WBC #/AREA URNS AUTO: 14 /HPF (ref 0–5)
WBC #/AREA URNS HPF: ABNORMAL /HPF (ref 0–5)

## 2023-11-02 PROCEDURE — 30233N1 TRANSFUSION OF NONAUTOLOGOUS RED BLOOD CELLS INTO PERIPHERAL VEIN, PERCUTANEOUS APPROACH: ICD-10-PCS | Performed by: INTERNAL MEDICINE

## 2023-11-02 PROCEDURE — 85018 HEMOGLOBIN: CPT

## 2023-11-02 PROCEDURE — 86850 RBC ANTIBODY SCREEN: CPT

## 2023-11-02 PROCEDURE — 93005 ELECTROCARDIOGRAM TRACING: CPT | Performed by: EMERGENCY MEDICINE

## 2023-11-02 PROCEDURE — 93010 ELECTROCARDIOGRAM REPORT: CPT | Performed by: INTERNAL MEDICINE

## 2023-11-02 PROCEDURE — 86923 COMPATIBILITY TEST ELECTRIC: CPT

## 2023-11-02 PROCEDURE — 36430 TRANSFUSION BLD/BLD COMPNT: CPT

## 2023-11-02 PROCEDURE — 85014 HEMATOCRIT: CPT

## 2023-11-02 PROCEDURE — 36415 COLL VENOUS BLD VENIPUNCTURE: CPT

## 2023-11-02 PROCEDURE — 85025 COMPLETE CBC W/AUTO DIFF WBC: CPT

## 2023-11-02 PROCEDURE — 83010 ASSAY OF HAPTOGLOBIN QUANT: CPT

## 2023-11-02 PROCEDURE — 2580000003 HC RX 258: Performed by: EMERGENCY MEDICINE

## 2023-11-02 PROCEDURE — 87186 SC STD MICRODIL/AGAR DIL: CPT

## 2023-11-02 PROCEDURE — 83540 ASSAY OF IRON: CPT

## 2023-11-02 PROCEDURE — 2580000003 HC RX 258: Performed by: INTERNAL MEDICINE

## 2023-11-02 PROCEDURE — 86901 BLOOD TYPING SEROLOGIC RH(D): CPT

## 2023-11-02 PROCEDURE — 81001 URINALYSIS AUTO W/SCOPE: CPT

## 2023-11-02 PROCEDURE — 80076 HEPATIC FUNCTION PANEL: CPT

## 2023-11-02 PROCEDURE — 86900 BLOOD TYPING SEROLOGIC ABO: CPT

## 2023-11-02 PROCEDURE — 82746 ASSAY OF FOLIC ACID SERUM: CPT

## 2023-11-02 PROCEDURE — C9113 INJ PANTOPRAZOLE SODIUM, VIA: HCPCS | Performed by: INTERNAL MEDICINE

## 2023-11-02 PROCEDURE — 84484 ASSAY OF TROPONIN QUANT: CPT

## 2023-11-02 PROCEDURE — 83615 LACTATE (LD) (LDH) ENZYME: CPT

## 2023-11-02 PROCEDURE — 82728 ASSAY OF FERRITIN: CPT

## 2023-11-02 PROCEDURE — 6370000000 HC RX 637 (ALT 250 FOR IP): Performed by: INTERNAL MEDICINE

## 2023-11-02 PROCEDURE — 85610 PROTHROMBIN TIME: CPT

## 2023-11-02 PROCEDURE — 86880 COOMBS TEST DIRECT: CPT

## 2023-11-02 PROCEDURE — 82607 VITAMIN B-12: CPT

## 2023-11-02 PROCEDURE — 87086 URINE CULTURE/COLONY COUNT: CPT

## 2023-11-02 PROCEDURE — 83550 IRON BINDING TEST: CPT

## 2023-11-02 PROCEDURE — 85730 THROMBOPLASTIN TIME PARTIAL: CPT

## 2023-11-02 PROCEDURE — 84443 ASSAY THYROID STIM HORMONE: CPT

## 2023-11-02 PROCEDURE — 87040 BLOOD CULTURE FOR BACTERIA: CPT

## 2023-11-02 PROCEDURE — P9016 RBC LEUKOCYTES REDUCED: HCPCS

## 2023-11-02 PROCEDURE — 99285 EMERGENCY DEPT VISIT HI MDM: CPT

## 2023-11-02 PROCEDURE — 6360000002 HC RX W HCPCS: Performed by: INTERNAL MEDICINE

## 2023-11-02 PROCEDURE — 80048 BASIC METABOLIC PNL TOTAL CA: CPT

## 2023-11-02 PROCEDURE — 1200000000 HC SEMI PRIVATE

## 2023-11-02 PROCEDURE — 87088 URINE BACTERIA CULTURE: CPT

## 2023-11-02 RX ORDER — SODIUM CHLORIDE 0.9 % (FLUSH) 0.9 %
5-40 SYRINGE (ML) INJECTION PRN
Status: DISCONTINUED | OUTPATIENT
Start: 2023-11-02 | End: 2023-11-03 | Stop reason: HOSPADM

## 2023-11-02 RX ORDER — ACETAMINOPHEN 650 MG/1
650 SUPPOSITORY RECTAL EVERY 6 HOURS PRN
Status: DISCONTINUED | OUTPATIENT
Start: 2023-11-02 | End: 2023-11-03 | Stop reason: HOSPADM

## 2023-11-02 RX ORDER — MAGNESIUM SULFATE IN WATER 40 MG/ML
2000 INJECTION, SOLUTION INTRAVENOUS PRN
Status: DISCONTINUED | OUTPATIENT
Start: 2023-11-02 | End: 2023-11-03 | Stop reason: HOSPADM

## 2023-11-02 RX ORDER — VITAMIN B COMPLEX
2000 TABLET ORAL DAILY
Status: DISCONTINUED | OUTPATIENT
Start: 2023-11-02 | End: 2023-11-03 | Stop reason: HOSPADM

## 2023-11-02 RX ORDER — ALBUTEROL SULFATE 90 UG/1
2 AEROSOL, METERED RESPIRATORY (INHALATION) EVERY 6 HOURS PRN
Status: DISCONTINUED | OUTPATIENT
Start: 2023-11-02 | End: 2023-11-02

## 2023-11-02 RX ORDER — CETIRIZINE HYDROCHLORIDE 10 MG/1
10 TABLET ORAL DAILY
Status: DISCONTINUED | OUTPATIENT
Start: 2023-11-02 | End: 2023-11-03 | Stop reason: HOSPADM

## 2023-11-02 RX ORDER — ALBUTEROL SULFATE 90 UG/1
2 AEROSOL, METERED RESPIRATORY (INHALATION) EVERY 4 HOURS PRN
Status: DISCONTINUED | OUTPATIENT
Start: 2023-11-02 | End: 2023-11-03 | Stop reason: HOSPADM

## 2023-11-02 RX ORDER — PANTOPRAZOLE SODIUM 40 MG/10ML
40 INJECTION, POWDER, LYOPHILIZED, FOR SOLUTION INTRAVENOUS DAILY
Status: DISCONTINUED | OUTPATIENT
Start: 2023-11-02 | End: 2023-11-02

## 2023-11-02 RX ORDER — POLYETHYLENE GLYCOL 3350 17 G/17G
17 POWDER, FOR SOLUTION ORAL DAILY PRN
Status: DISCONTINUED | OUTPATIENT
Start: 2023-11-02 | End: 2023-11-03 | Stop reason: HOSPADM

## 2023-11-02 RX ORDER — SODIUM CHLORIDE 0.9 % (FLUSH) 0.9 %
5-40 SYRINGE (ML) INJECTION EVERY 12 HOURS SCHEDULED
Status: DISCONTINUED | OUTPATIENT
Start: 2023-11-02 | End: 2023-11-03 | Stop reason: HOSPADM

## 2023-11-02 RX ORDER — HYDROCHLOROTHIAZIDE 25 MG/1
12.5 TABLET ORAL DAILY
Status: DISCONTINUED | OUTPATIENT
Start: 2023-11-02 | End: 2023-11-03 | Stop reason: HOSPADM

## 2023-11-02 RX ORDER — FLUTICASONE PROPIONATE 50 MCG
2 SPRAY, SUSPENSION (ML) NASAL DAILY
Status: DISCONTINUED | OUTPATIENT
Start: 2023-11-02 | End: 2023-11-03 | Stop reason: HOSPADM

## 2023-11-02 RX ORDER — PANTOPRAZOLE SODIUM 40 MG/10ML
40 INJECTION, POWDER, LYOPHILIZED, FOR SOLUTION INTRAVENOUS DAILY
Status: DISCONTINUED | OUTPATIENT
Start: 2023-11-02 | End: 2023-11-03 | Stop reason: HOSPADM

## 2023-11-02 RX ORDER — ACETAMINOPHEN 325 MG/1
650 TABLET ORAL EVERY 6 HOURS PRN
Status: DISCONTINUED | OUTPATIENT
Start: 2023-11-02 | End: 2023-11-03 | Stop reason: HOSPADM

## 2023-11-02 RX ORDER — POTASSIUM CHLORIDE 20 MEQ/1
40 TABLET, EXTENDED RELEASE ORAL PRN
Status: DISCONTINUED | OUTPATIENT
Start: 2023-11-02 | End: 2023-11-03 | Stop reason: HOSPADM

## 2023-11-02 RX ORDER — SODIUM CHLORIDE 9 MG/ML
INJECTION, SOLUTION INTRAVENOUS PRN
Status: DISCONTINUED | OUTPATIENT
Start: 2023-11-02 | End: 2023-11-03 | Stop reason: HOSPADM

## 2023-11-02 RX ORDER — POTASSIUM CHLORIDE 7.45 MG/ML
10 INJECTION INTRAVENOUS PRN
Status: DISCONTINUED | OUTPATIENT
Start: 2023-11-02 | End: 2023-11-03 | Stop reason: HOSPADM

## 2023-11-02 RX ORDER — METHIMAZOLE 5 MG/1
5 TABLET ORAL EVERY OTHER DAY
Status: DISCONTINUED | OUTPATIENT
Start: 2023-11-03 | End: 2023-11-03 | Stop reason: HOSPADM

## 2023-11-02 RX ORDER — METHIMAZOLE 5 MG/1
5 TABLET ORAL EVERY OTHER DAY
Status: DISCONTINUED | OUTPATIENT
Start: 2023-11-02 | End: 2023-11-02

## 2023-11-02 RX ORDER — MONTELUKAST SODIUM 10 MG/1
10 TABLET ORAL NIGHTLY
Status: DISCONTINUED | OUTPATIENT
Start: 2023-11-02 | End: 2023-11-03 | Stop reason: HOSPADM

## 2023-11-02 RX ORDER — ONDANSETRON 4 MG/1
4 TABLET, ORALLY DISINTEGRATING ORAL EVERY 8 HOURS PRN
Status: DISCONTINUED | OUTPATIENT
Start: 2023-11-02 | End: 2023-11-03 | Stop reason: HOSPADM

## 2023-11-02 RX ORDER — PEG-3350, SODIUM SULFATE, SODIUM CHLORIDE, POTASSIUM CHLORIDE, SODIUM ASCORBATE AND ASCORBIC ACID 7.5-2.691G
100 KIT ORAL ONCE
Status: COMPLETED | OUTPATIENT
Start: 2023-11-02 | End: 2023-11-02

## 2023-11-02 RX ORDER — ONDANSETRON 2 MG/ML
4 INJECTION INTRAMUSCULAR; INTRAVENOUS EVERY 6 HOURS PRN
Status: DISCONTINUED | OUTPATIENT
Start: 2023-11-02 | End: 2023-11-03 | Stop reason: HOSPADM

## 2023-11-02 RX ORDER — PEG-3350, SODIUM SULFATE, SODIUM CHLORIDE, POTASSIUM CHLORIDE, SODIUM ASCORBATE AND ASCORBIC ACID 7.5-2.691G
100 KIT ORAL ONCE
Status: COMPLETED | OUTPATIENT
Start: 2023-11-03 | End: 2023-11-03

## 2023-11-02 RX ORDER — SODIUM CHLORIDE 9 MG/ML
INJECTION, SOLUTION INTRAVENOUS PRN
Status: COMPLETED | OUTPATIENT
Start: 2023-11-02 | End: 2023-11-02

## 2023-11-02 RX ADMIN — POLYETHYLENE GLYCOL 3350, SODIUM SULFATE, SODIUM CHLORIDE, POTASSIUM CHLORIDE, ASCORBIC ACID, SODIUM ASCORBATE 100 G: KIT at 21:37

## 2023-11-02 RX ADMIN — CEFTRIAXONE SODIUM 1000 MG: 1 INJECTION, POWDER, FOR SOLUTION INTRAMUSCULAR; INTRAVENOUS at 21:56

## 2023-11-02 RX ADMIN — HYDROCHLOROTHIAZIDE 12.5 MG: 25 TABLET ORAL at 15:09

## 2023-11-02 RX ADMIN — SODIUM CHLORIDE: 9 INJECTION, SOLUTION INTRAVENOUS at 10:46

## 2023-11-02 RX ADMIN — Medication 10 ML: at 21:13

## 2023-11-02 RX ADMIN — Medication 2000 UNITS: at 15:10

## 2023-11-02 RX ADMIN — PANTOPRAZOLE SODIUM 40 MG: 40 INJECTION, POWDER, FOR SOLUTION INTRAVENOUS at 21:31

## 2023-11-02 RX ADMIN — MONTELUKAST SODIUM 10 MG: 10 TABLET, FILM COATED ORAL at 21:12

## 2023-11-02 ASSESSMENT — LIFESTYLE VARIABLES
HOW OFTEN DO YOU HAVE A DRINK CONTAINING ALCOHOL: 2-3 TIMES A WEEK
HOW MANY STANDARD DRINKS CONTAINING ALCOHOL DO YOU HAVE ON A TYPICAL DAY: 1 OR 2
HOW MANY STANDARD DRINKS CONTAINING ALCOHOL DO YOU HAVE ON A TYPICAL DAY: PATIENT DOES NOT DRINK
HOW OFTEN DO YOU HAVE A DRINK CONTAINING ALCOHOL: NEVER

## 2023-11-02 ASSESSMENT — PAIN SCALES - GENERAL
PAINLEVEL_OUTOF10: 0

## 2023-11-02 ASSESSMENT — PAIN - FUNCTIONAL ASSESSMENT: PAIN_FUNCTIONAL_ASSESSMENT: NONE - DENIES PAIN

## 2023-11-02 NOTE — CARE COORDINATION
Discharge Planning:     (CM) reviewed the patient's chart to assess needs. Patient's Readmission Risk Score is 13%. Patient's medical insurance is [employer]. Patient's PCP is Dr. Chaim Norman. No needs anticipated, at this time. CM team to follow. Staff to inform CM if additional discharge needs arise.         BEV Jovel, [facility] -   [phone]    Electronically signed by TIANNA Teran on 11/2/2023 at 1:32 PM

## 2023-11-02 NOTE — ED NOTES
ED TO INPATIENT SBAR HANDOFF    Patient Name: Ally Gonzales   :  1960  66 y.o. MRN:  [id number]  Preferred Name  [facility]  ED Room #:  ED-0007/07  Family/Caregiver Present yes   Restraints no   Sitter no   Sepsis Risk Score Sepsis Risk Score: 3.83    Situation  Code Status: No Order No additional code details. Allergies: Azithromycin  Weight: Patient Vitals for the past 96 hrs (Last 3 readings):   Weight   23 0815 77.1 kg (170 lb)   23 0737 77.1 kg (170 lb)     Arrived from: home  Chief Complaint:   Chief Complaint   Patient presents with    Abnormal Lab     Pt hgb of 5. Labs drawn this AM by PCP and instructed to come to ED. Pt endorsing lightheadedness, low appetite and dizziness. Denies bleeding. Hospital Problem/Diagnosis:  Principal Problem:    Symptomatic anemia  Active Problems:    Hyperthyroidism    Graves' disease    Asthma    Restrictive lung disease    COPD (chronic obstructive pulmonary disease) (HCC)    Obesity (BMI 30-39. 9)    Type 2 diabetes mellitus  Resolved Problems:    * No resolved hospital problems.  *    Imaging:   No orders to display     Abnormal labs:   Abnormal Labs Reviewed   CBC WITH AUTO DIFFERENTIAL - Abnormal; Notable for the following components:       Result Value    RBC 1.13 (*)     Hemoglobin 5.0 (*)     Hematocrit 14.6 (*)     .6 (*)     MCH 44.1 (*)     RDW 16.3 (*)     Platelets 96 (*)     Neutrophils Absolute 0.8 (*)     nRBC 2 (*)     Anisocytosis Occasional (*)     Macrocytes 1+ (*)     Polychromasia Occasional (*)     Hypochromia 1+ (*)     Ovalocytes 1+ (*)     Tear Drop Cells Occasional (*)     All other components within normal limits    Narrative:     CALL  [place]  [facility] tel. [phone],  Rejected Test PT, PTT Called to:IRIS Wang , 2023 08:48, by Charlesetta Lanes  [facility] tel. [phone],  Hematology results called to and read back by anastasiya houston rn, 2023  09:58, by [facility]  Hematology results called to and read

## 2023-11-02 NOTE — ED NOTES
Pt brought to room 7. Sitting up in bed alert. Per handoff blood sent.       Gurinder Kelley RN  11/02/23 6986

## 2023-11-02 NOTE — PROGRESS NOTES
Patient seen in ED, room 07. Admission completed with the following exceptions:  4 Eyes Assessment, Immunizations, Vaccines, Rights and Responsibilities, Orientation to room, Plan of Care, Education/Learning Assessment and Education Plan, white board, height and weight, pain assessment and head to toe assessment. Patient is alert and oriented X 4. Patient lives in  a two story house with her boyfriend and is being admitted for symptomatic anemia. Home Medications as well as Outside Sources has been verbally reviewed with patient and updated as appropriate and is now Completed. All questions answered.

## 2023-11-02 NOTE — ED NOTES
Blood restarted at 1132. Pt denies increasing chest pressure at this time.       Hung Kurtz RN  11/02/23 1140       Hung Kurtz RN  11/02/23 1148

## 2023-11-02 NOTE — CONSULTS
Onset    Cancer Mother         unknown    Liver Disease Mother     Kidney Disease Mother     Diabetes Father     Lupus Sister     Prostate Cancer Brother      Social History     Tobacco Use    Smoking status: Never    Smokeless tobacco: Never   Substance Use Topics    Alcohol use: Yes     Alcohol/week: 2.0 standard drinks of alcohol     Types: 2 Cans of beer per week     Comment: beer most days      Allergies   Allergen Reactions    Azithromycin Hives     Prior to Admission medications    Medication Sig Start Date End Date Taking?  Authorizing Provider   methIMAzole (TAPAZOLE) 5 MG tablet TAKE 1 TABLET BY MOUTH EVERY OTHER DAY 11/1/23   Floresita Almonte MD   cetirizine (ZYRTEC) 10 MG tablet Take 1 tablet by mouth daily 11/1/23 4/29/24  Dalton Zimmer MD   Semaglutide (RYBELSUS) 3 MG TABS Take 1 tablet by mouth daily  Patient not taking: Reported on 11/2/2023 9/29/23   Dalton Zimmer MD   hydroCHLOROthiazide (HYDRODIURIL) 12.5 MG tablet Take 1 tablet by mouth daily One tab daily 9/20/23   Dalton Zimmer MD   fluticasone-umeclidin-vilant (TRELEGY ELLIPTA) 156-43.0-45 MCG/ACT AEPB inhaler Inhale 1 puff into the lungs daily 9/20/23   Dalton iZmmer MD   Cholecalciferol (VITAMIN D) 50 MCG (2000 UT) CAPS capsule Take 1 capsule by mouth daily 5/8/23   Dalton Zimmer MD   albuterol sulfate HFA (PROVENTIL;VENTOLIN;PROAIR) 108 (90 Base) MCG/ACT inhaler Inhale 2 puffs into the lungs every 6 hours as needed for Wheezing or Shortness of Breath 5/2/23   Dalton Zimmer MD   ibuprofen (ADVIL;MOTRIN) 800 MG tablet Take 1 tablet by mouth every 8 hours as needed for Pain 2/13/23   Dalton Zimmer MD   Blood Pressure Monitoring (MICROLIFE BPM1 BP MONITOR) KIT 1 kit by Does not apply route daily 11/9/22   Judy French APRN - MONICA   fluticasone-umeclidin-vilant (TRELEGY ELLIPTA) 100-62.5-25 MCG/INH AEPB Inhale 1 puff into the lungs daily 9/28/22   Dalton Zimmer HFA    Imaging:  No results found. Labs Reviewed:     Admitting hemoglobin 5  platelet count is 96  AST is elevated at 93 her total bilirubin 1.6 her ALT is 40        Assessment/Plan:     Principal Problem:    Symptomatic anemia  Active Problems:    Hyperthyroidism    Graves' disease    Asthma    Restrictive lung disease    COPD (chronic obstructive pulmonary disease) (HCC)    Obesity (BMI 30-39. 9)    Type 2 diabetes mellitus  Resolved Problems:    * No resolved hospital problems. *  Severe anemia  -Patient is going to need a upper and lower endoscopy to evaluate this  -Plan to do this tomorrow  -The marked elevation of the MCV makes us concerned that she has some form of vitamin B12 or folate acid deficiency along with an iron deficiency      Signed By: Desean Yan MD     November 2, 2023      Please note that some or all of this record was generated using voice recognition software. If there are any questions about the content of this document, please contact the author as some errors in translation may have occurred.

## 2023-11-02 NOTE — ED NOTES
1ST UNIT OF [facility] still infusing, pt transported to room 573 with this RN via stretcher.  VSS  No s/s of reaction noted  Lina Noyola RN  11/02/23 0393 Holloway Darci, Annie Hayes RN  11/02/23 130

## 2023-11-02 NOTE — ACP (ADVANCE CARE PLANNING)
Advanced Care Planning Note. Purpose of Encounter: Advanced care planning in light of symptomatic anemia  Parties In Attendance: Patient, sister, daughter  Decisional Capacity: Yes  Subjective: Patient is dizzy and SOB  Objective: Cr 0.9  Goals of Care Determination: Patient wants full support (CPR, vent, surgery, HD, trach, PEG)  Plan:  PRBC, IV Protonix, IV Abx, EGD/Colonoscopy, GI and Onc consults  Code Status: Full code   Time spent on Advanced care Plannin minutes  Advanced Care Planning Documents: Completed advanced directives on chart, sister is the POA.     Angelica Sales MD  2023 11:04 AM

## 2023-11-02 NOTE — TELEPHONE ENCOUNTER
Patient has a hemoglobin that is 5.0 g/dl. I called her and left a message for her to go to the emergency department, immediately. I will call her daughter (Marley Narayan) as well.

## 2023-11-02 NOTE — PROGRESS NOTES
4 Eyes Skin Assessment     NAME:  Candice Narayan  YOB: 1960  MEDICAL RECORD NUMBER:  [id number]    The patient is being assessed for  Admission    I agree that at least one RN has performed a thorough Head to Toe Skin Assessment on the patient. ALL assessment sites listed below have been assessed. Areas assessed by both nurses:    Head, Face, Ears, Shoulders, Back, Chest, Arms, Elbows, Hands, Sacrum. Buttock, Coccyx, Ischium, and Legs. Feet and Heels        Does the Patient have a Wound? Yes wound(s) were present on assessment.  LDA wound assessment was Initiated and completed by RN       Gilberto Prevention initiated by RN: Yes  Wound Care Orders initiated by RN: No    Pressure Injury (Stage 3,4, Unstageable, DTI, NWPT, and Complex wounds) if present, place Wound referral order by RN under : No    New Ostomies, if present place, Ostomy referral order under : No     Nurse 1 eSignature: Electronically signed by Alice Bean RN on 11/2/23 at 7:19 PM EDT    **SHARE this note so that the co-signing nurse can place an eSignature**    Nurse 2 eSignature: Electronically signed by Amanda Rod RN on 11/2/23 at 7:38 PM EDT

## 2023-11-02 NOTE — ED NOTES
1046 blood reached pt. Pt speaking with admission RN calm and appropriate. Daughter at bedside.       Tessie, Michelle Mcdowell, IRIS  11/02/23 9278

## 2023-11-02 NOTE — ED NOTES
Pt sat more upright in bed. Samuel Farah RN at bedside with this RN VS assessed. Temp taken. Pt states she feels a bit better sitting more upright.  This RN at bedside     Godfrey Garnica RN  11/02/23 3727

## 2023-11-02 NOTE — CONSENT
Informed Consent for Blood Component Transfusion Note    I have discussed with the patient the rationale for blood component transfusion; its benefits in treating or preventing fatigue, organ damage, or death; and its risk which includes mild transfusion reactions, rare risk of blood borne infection, or more serious but rare reactions. I have discussed the alternatives to transfusion, including the risk and consequences of not receiving transfusion. The patient had an opportunity to ask questions and had agreed to proceed with transfusion of blood components.     Electronically signed by Samra Stearns MD on 60/0/82 at 9:22 AM EDT

## 2023-11-03 ENCOUNTER — ANESTHESIA EVENT (OUTPATIENT)
Dept: ENDOSCOPY | Age: 63
DRG: 809 | End: 2023-11-03
Payer: COMMERCIAL

## 2023-11-03 ENCOUNTER — APPOINTMENT (OUTPATIENT)
Dept: CT IMAGING | Age: 63
DRG: 809 | End: 2023-11-03
Payer: COMMERCIAL

## 2023-11-03 ENCOUNTER — ANESTHESIA (OUTPATIENT)
Dept: ENDOSCOPY | Age: 63
DRG: 809 | End: 2023-11-03
Payer: COMMERCIAL

## 2023-11-03 VITALS
RESPIRATION RATE: 16 BRPM | BODY MASS INDEX: 30.12 KG/M2 | HEART RATE: 68 BPM | HEIGHT: 63 IN | TEMPERATURE: 98.5 F | OXYGEN SATURATION: 97 % | DIASTOLIC BLOOD PRESSURE: 51 MMHG | WEIGHT: 170 LBS | SYSTOLIC BLOOD PRESSURE: 118 MMHG

## 2023-11-03 LAB
ANION GAP SERPL CALCULATED.3IONS-SCNC: 14 MMOL/L (ref 3–16)
ANISOCYTOSIS BLD QL SMEAR: ABNORMAL
BACTERIA BLD CULT: NORMAL
BASOPHILS # BLD: 0 K/UL (ref 0–0.2)
BASOPHILS NFR BLD: 0 %
BUN SERPL-MCNC: 10 MG/DL (ref 7–20)
CALCIUM SERPL-MCNC: 9.3 MG/DL (ref 8.3–10.6)
CHLORIDE SERPL-SCNC: 103 MMOL/L (ref 99–110)
CO2 SERPL-SCNC: 24 MMOL/L (ref 21–32)
CREAT SERPL-MCNC: 1 MG/DL (ref 0.6–1.2)
DACRYOCYTES BLD QL SMEAR: ABNORMAL
DEPRECATED RDW RBC AUTO: 33.1 % (ref 12.4–15.4)
EOSINOPHIL # BLD: 0.1 K/UL (ref 0–0.6)
EOSINOPHIL NFR BLD: 2 %
FOLATE SERPL-MCNC: 5.12 NG/ML (ref 4.78–24.2)
GFR SERPLBLD CREATININE-BSD FMLA CKD-EPI: >60 ML/MIN/{1.73_M2}
GLUCOSE SERPL-MCNC: 99 MG/DL (ref 70–99)
HBV SURFACE AB SERPL IA-ACNC: <3.5 MIU/ML
HBV SURFACE AG SERPL QL IA: NORMAL
HCT VFR BLD AUTO: 24.8 % (ref 36–48)
HCV AB SERPL QL IA: NORMAL
HGB BLD-MCNC: 8.4 G/DL (ref 12–16)
LYMPHOCYTES # BLD: 2.1 K/UL (ref 1–5.1)
LYMPHOCYTES NFR BLD: 64 %
MCH RBC QN AUTO: 34.9 PG (ref 26–34)
MCHC RBC AUTO-ENTMCNC: 33.8 G/DL (ref 31–36)
MCV RBC AUTO: 103.4 FL (ref 80–100)
MONOCYTES # BLD: 0 K/UL (ref 0–1.3)
MONOCYTES NFR BLD: 0 %
MYELOCYTES NFR BLD MANUAL: 1 %
NEUTROPHILS # BLD: 1.1 K/UL (ref 1.7–7.7)
NEUTROPHILS NFR BLD: 33 %
NRBC BLD-RTO: 2 /100 WBC
OVALOCYTES BLD QL SMEAR: ABNORMAL
PATH INTERP BLD-IMP: NO
PLATELET # BLD AUTO: 72 K/UL (ref 135–450)
PLATELET BLD QL SMEAR: ABNORMAL
PMV BLD AUTO: 8 FL (ref 5–10.5)
POLYCHROMASIA BLD QL SMEAR: ABNORMAL
POTASSIUM SERPL-SCNC: 3.9 MMOL/L (ref 3.5–5.1)
RBC # BLD AUTO: 2.4 M/UL (ref 4–5.2)
SCHISTOCYTES BLD QL SMEAR: ABNORMAL
SLIDE REVIEW: ABNORMAL
SODIUM SERPL-SCNC: 141 MMOL/L (ref 136–145)
VIT B12 SERPL-MCNC: <150 PG/ML (ref 211–911)
WBC # BLD AUTO: 3.3 K/UL (ref 4–11)

## 2023-11-03 PROCEDURE — 2580000003 HC RX 258: Performed by: NURSE ANESTHETIST, CERTIFIED REGISTERED

## 2023-11-03 PROCEDURE — 30233N1 TRANSFUSION OF NONAUTOLOGOUS RED BLOOD CELLS INTO PERIPHERAL VEIN, PERCUTANEOUS APPROACH: ICD-10-PCS | Performed by: INTERNAL MEDICINE

## 2023-11-03 PROCEDURE — 6370000000 HC RX 637 (ALT 250 FOR IP): Performed by: INTERNAL MEDICINE

## 2023-11-03 PROCEDURE — 86704 HEP B CORE ANTIBODY TOTAL: CPT

## 2023-11-03 PROCEDURE — 3609027000 HC COLONOSCOPY: Performed by: INTERNAL MEDICINE

## 2023-11-03 PROCEDURE — 88305 TISSUE EXAM BY PATHOLOGIST: CPT

## 2023-11-03 PROCEDURE — 6360000002 HC RX W HCPCS: Performed by: INTERNAL MEDICINE

## 2023-11-03 PROCEDURE — 88313 SPECIAL STAINS GROUP 2: CPT

## 2023-11-03 PROCEDURE — 77012 CT SCAN FOR NEEDLE BIOPSY: CPT

## 2023-11-03 PROCEDURE — 80048 BASIC METABOLIC PNL TOTAL CA: CPT

## 2023-11-03 PROCEDURE — 6360000002 HC RX W HCPCS: Performed by: RADIOLOGY

## 2023-11-03 PROCEDURE — 2500000003 HC RX 250 WO HCPCS: Performed by: NURSE ANESTHETIST, CERTIFIED REGISTERED

## 2023-11-03 PROCEDURE — 2720000010 CT BIOPSY BONE MARROW

## 2023-11-03 PROCEDURE — 86706 HEP B SURFACE ANTIBODY: CPT

## 2023-11-03 PROCEDURE — 88311 DECALCIFY TISSUE: CPT

## 2023-11-03 PROCEDURE — 0DJD8ZZ INSPECTION OF LOWER INTESTINAL TRACT, VIA NATURAL OR ARTIFICIAL OPENING ENDOSCOPIC: ICD-10-PCS | Performed by: INTERNAL MEDICINE

## 2023-11-03 PROCEDURE — 83516 IMMUNOASSAY NONANTIBODY: CPT

## 2023-11-03 PROCEDURE — 87340 HEPATITIS B SURFACE AG IA: CPT

## 2023-11-03 PROCEDURE — 3700000001 HC ADD 15 MINUTES (ANESTHESIA): Performed by: INTERNAL MEDICINE

## 2023-11-03 PROCEDURE — 94760 N-INVAS EAR/PLS OXIMETRY 1: CPT

## 2023-11-03 PROCEDURE — 3609012400 HC EGD TRANSORAL BIOPSY SINGLE/MULTIPLE: Performed by: INTERNAL MEDICINE

## 2023-11-03 PROCEDURE — 0DJD8ZZ INSPECTION OF LOWER INTESTINAL TRACT, VIA NATURAL OR ARTIFICIAL OPENING ENDOSCOPIC: ICD-10-PCS | Performed by: RADIOLOGY

## 2023-11-03 PROCEDURE — 85025 COMPLETE CBC W/AUTO DIFF WBC: CPT

## 2023-11-03 PROCEDURE — 0DB98ZX EXCISION OF DUODENUM, VIA NATURAL OR ARTIFICIAL OPENING ENDOSCOPIC, DIAGNOSTIC: ICD-10-PCS | Performed by: RADIOLOGY

## 2023-11-03 PROCEDURE — 6360000002 HC RX W HCPCS: Performed by: NURSE ANESTHETIST, CERTIFIED REGISTERED

## 2023-11-03 PROCEDURE — 0DB98ZX EXCISION OF DUODENUM, VIA NATURAL OR ARTIFICIAL OPENING ENDOSCOPIC, DIAGNOSTIC: ICD-10-PCS | Performed by: INTERNAL MEDICINE

## 2023-11-03 PROCEDURE — 2709999900 HC NON-CHARGEABLE SUPPLY: Performed by: INTERNAL MEDICINE

## 2023-11-03 PROCEDURE — 87040 BLOOD CULTURE FOR BACTERIA: CPT

## 2023-11-03 PROCEDURE — 36415 COLL VENOUS BLD VENIPUNCTURE: CPT

## 2023-11-03 PROCEDURE — 88342 IMHCHEM/IMCYTCHM 1ST ANTB: CPT

## 2023-11-03 PROCEDURE — 7100000000 HC PACU RECOVERY - FIRST 15 MIN: Performed by: INTERNAL MEDICINE

## 2023-11-03 PROCEDURE — 88185 FLOWCYTOMETRY/TC ADD-ON: CPT

## 2023-11-03 PROCEDURE — 88184 FLOWCYTOMETRY/ TC 1 MARKER: CPT

## 2023-11-03 PROCEDURE — 07DR3ZX EXTRACTION OF ILIAC BONE MARROW, PERCUTANEOUS APPROACH, DIAGNOSTIC: ICD-10-PCS | Performed by: RADIOLOGY

## 2023-11-03 PROCEDURE — 2580000003 HC RX 258: Performed by: INTERNAL MEDICINE

## 2023-11-03 PROCEDURE — C9113 INJ PANTOPRAZOLE SODIUM, VIA: HCPCS | Performed by: INTERNAL MEDICINE

## 2023-11-03 PROCEDURE — 86803 HEPATITIS C AB TEST: CPT

## 2023-11-03 PROCEDURE — 7100000001 HC PACU RECOVERY - ADDTL 15 MIN: Performed by: INTERNAL MEDICINE

## 2023-11-03 PROCEDURE — 86340 INTRINSIC FACTOR ANTIBODY: CPT

## 2023-11-03 PROCEDURE — 3700000000 HC ANESTHESIA ATTENDED CARE: Performed by: INTERNAL MEDICINE

## 2023-11-03 RX ORDER — FENTANYL CITRATE 50 UG/ML
INJECTION, SOLUTION INTRAMUSCULAR; INTRAVENOUS PRN
Status: COMPLETED | OUTPATIENT
Start: 2023-11-03 | End: 2023-11-03

## 2023-11-03 RX ORDER — MIDAZOLAM HYDROCHLORIDE 1 MG/ML
INJECTION INTRAMUSCULAR; INTRAVENOUS PRN
Status: DISCONTINUED | OUTPATIENT
Start: 2023-11-03 | End: 2023-11-03 | Stop reason: SDUPTHER

## 2023-11-03 RX ORDER — LIDOCAINE HYDROCHLORIDE 20 MG/ML
INJECTION, SOLUTION INFILTRATION; PERINEURAL PRN
Status: DISCONTINUED | OUTPATIENT
Start: 2023-11-03 | End: 2023-11-03 | Stop reason: SDUPTHER

## 2023-11-03 RX ORDER — MIDAZOLAM HYDROCHLORIDE 2 MG/2ML
INJECTION, SOLUTION INTRAMUSCULAR; INTRAVENOUS PRN
Status: COMPLETED | OUTPATIENT
Start: 2023-11-03 | End: 2023-11-03

## 2023-11-03 RX ORDER — SODIUM CHLORIDE 9 MG/ML
INJECTION, SOLUTION INTRAVENOUS CONTINUOUS PRN
Status: DISCONTINUED | OUTPATIENT
Start: 2023-11-03 | End: 2023-11-03 | Stop reason: SDUPTHER

## 2023-11-03 RX ORDER — PROPOFOL 10 MG/ML
INJECTION, EMULSION INTRAVENOUS PRN
Status: DISCONTINUED | OUTPATIENT
Start: 2023-11-03 | End: 2023-11-03 | Stop reason: SDUPTHER

## 2023-11-03 RX ORDER — CYANOCOBALAMIN 1000 UG/ML
1000 INJECTION, SOLUTION INTRAMUSCULAR; SUBCUTANEOUS DAILY
Status: DISCONTINUED | OUTPATIENT
Start: 2023-11-03 | End: 2023-11-03 | Stop reason: HOSPADM

## 2023-11-03 RX ADMIN — PROPOFOL 120 MCG/KG/MIN: 10 INJECTION, EMULSION INTRAVENOUS at 14:29

## 2023-11-03 RX ADMIN — MIDAZOLAM HYDROCHLORIDE 1 MG: 1 INJECTION, SOLUTION INTRAMUSCULAR; INTRAVENOUS at 09:04

## 2023-11-03 RX ADMIN — SODIUM CHLORIDE: 9 INJECTION, SOLUTION INTRAVENOUS at 14:17

## 2023-11-03 RX ADMIN — PANTOPRAZOLE SODIUM 40 MG: 40 INJECTION, POWDER, FOR SOLUTION INTRAVENOUS at 15:00

## 2023-11-03 RX ADMIN — MIDAZOLAM HYDROCHLORIDE 1 MG: 1 INJECTION, SOLUTION INTRAMUSCULAR; INTRAVENOUS at 09:08

## 2023-11-03 RX ADMIN — Medication 2000 UNITS: at 16:02

## 2023-11-03 RX ADMIN — LIDOCAINE HYDROCHLORIDE 60 MG: 20 INJECTION, SOLUTION INFILTRATION; PERINEURAL at 14:28

## 2023-11-03 RX ADMIN — METHIMAZOLE 5 MG: 5 TABLET ORAL at 16:02

## 2023-11-03 RX ADMIN — FENTANYL CITRATE 50 MCG: 50 INJECTION, SOLUTION INTRAMUSCULAR; INTRAVENOUS at 09:04

## 2023-11-03 RX ADMIN — FENTANYL CITRATE 50 MCG: 50 INJECTION, SOLUTION INTRAMUSCULAR; INTRAVENOUS at 09:07

## 2023-11-03 RX ADMIN — Medication 10 ML: at 08:15

## 2023-11-03 RX ADMIN — MIDAZOLAM 2 MG: 1 INJECTION INTRAMUSCULAR; INTRAVENOUS at 14:31

## 2023-11-03 RX ADMIN — CYANOCOBALAMIN 1000 MCG: 1000 INJECTION, SOLUTION INTRAMUSCULAR; SUBCUTANEOUS at 16:02

## 2023-11-03 RX ADMIN — PROPOFOL 80 MG: 10 INJECTION, EMULSION INTRAVENOUS at 14:28

## 2023-11-03 RX ADMIN — MIDAZOLAM HYDROCHLORIDE 1 MG: 1 INJECTION, SOLUTION INTRAMUSCULAR; INTRAVENOUS at 08:48

## 2023-11-03 RX ADMIN — POLYETHYLENE GLYCOL 3350, SODIUM SULFATE, SODIUM CHLORIDE, POTASSIUM CHLORIDE, ASCORBIC ACID, SODIUM ASCORBATE 100 G: KIT at 01:40

## 2023-11-03 ASSESSMENT — PAIN SCALES - GENERAL
PAINLEVEL_OUTOF10: 1
PAINLEVEL_OUTOF10: 0
PAINLEVEL_OUTOF10: 0

## 2023-11-03 ASSESSMENT — PAIN DESCRIPTION - FREQUENCY: FREQUENCY: INTERMITTENT

## 2023-11-03 NOTE — BRIEF OP NOTE
Brief Postoperative Note    Bill Narayan  YOB: 1960  [id number]    Pre-operative Diagnosis: Blood Dyscrasia     Post-operative Diagnosis: Same    Procedure: Bone Marrow Aspiration and Biopsy    Anesthesia: Moderate Sedation    Surgeons/Assistants: Rossana Bhandari MD    Estimated Blood Loss: less than 5    Complications: None    Specimens: Was Obtained: 15 cc of bone marrow and 1 cm BM core    Findings: Successful right iliac bone marrow aspiration and biopsy    Electronically signed by Rossana Bhandari MD on 11/3/2023 at 10:14 AM

## 2023-11-03 NOTE — ANESTHESIA POSTPROCEDURE EVALUATION
Department of Anesthesiology  Postprocedure Note    Patient: Kristopher Dudley  MRN: [id number]  YOB: 1960  Date of evaluation: 11/3/2023      Procedure Summary     Date: 11/03/23 Room / Location: [address] [place] [address]    Anesthesia Start: 0104 Anesthesia Stop: 6991    Procedures:       EGD BIOPSY (Abdomen)      COLONOSCOPY DIAGNOSTIC Diagnosis:       Anemia, unspecified type      (Anemia, unspecified type [D64.9])    Surgeons: Jono Cordova MD Responsible Provider: Denise Triana MD    Anesthesia Type: MAC ASA Status: 3          Anesthesia Type: No value filed. Kayla Phase I: Kayla Score: 10    Kayla Phase II:        Anesthesia Post Evaluation    Patient location during evaluation: PACU  Level of consciousness: awake  Airway patency: patent  Complications: no  Cardiovascular status: hemodynamically stable  Respiratory status: acceptable  There was medical reason for not using a multimodal analgesia pain management approach.

## 2023-11-03 NOTE — PROGRESS NOTES
Patient vitals and assessment completed. Patient going off unit at 2439 for CT guided Bone marrow biopsy.

## 2023-11-03 NOTE — PROGRESS NOTES
Hospitalist Progress Note      PCP: Rubi Monk MD    Date of Admission: 11/2/2023    LOS: 1    Chief Complaint:   Chief Complaint   Patient presents with    Abnormal Lab     Pt hgb of 5. Labs drawn this AM by PCP and instructed to come to ED. Pt endorsing lightheadedness, low appetite and dizziness. Denies bleeding. Case Summary:   62-year-old lady with history of hyperthyroidism, Graves' disease, COPD/asthma, type 2 diabetes, obesity who was admitted with symptomatic anemia found to have pancytopenia secondary to B12 deficiency and hemolysis secondary to B12 deficiency. Status post bone marrow biopsy 11/3/2023      Active Hospital Problems    Diagnosis Date Noted    Symptomatic anemia [D64.9] 11/02/2023    Type 2 diabetes mellitus [E11.9] 03/29/2023    Obesity (BMI 30-39. 9) [E66.9] 08/31/2019    COPD (chronic obstructive pulmonary disease) ([address]) [J44.9] 09/08/2014    Restrictive lung disease [J98.4] 09/08/2014    Asthma [J45.909]     Graves' disease [E05.00] 07/08/2014    Hyperthyroidism [E05.90] 06/20/2014         Principal Problem:    Symptomatic anemia with pancytopenia and hemolysis secondary to B12 deficiency: Patient was consulted by hematology/oncology.  - Status post bone marrow biopsy this morning and will follow-up results with hematology in the outpatient  - Continue B12 replacement therapy  - For colonoscopy this evening with plans for discharge thereafter    Active Problems:    Graves' disease and Hyperthyroidism: On methimazole    Asthma/Restrictive lung disease/COPD (chronic obstructive pulmonary disease): Without exacerbation. Continue as needed inhalers, montelukast, Zyrtec, fluticasone    Obesity (BMI 30-39. 9)    Type 2 diabetes mellitus: Diet controlled.   Will monitor        Medications:  Reviewed  Infusion Medications    sodium chloride       Scheduled Medications    cyanocobalamin  1,000 mcg IntraMUSCular Daily    cetirizine  10 mg Oral Daily    Vitamin D  2,000 Units

## 2023-11-03 NOTE — PRE SEDATION
Taking? Authorizing Provider   methIMAzole (TAPAZOLE) 5 MG tablet TAKE 1 TABLET BY MOUTH EVERY OTHER DAY 11/1/23   Tyler Perez MD   cetirizine (ZYRTEC) 10 MG tablet Take 1 tablet by mouth daily 11/1/23 4/29/24  Candice Lam MD   Semaglutide (RYBELSUS) 3 MG TABS Take 1 tablet by mouth daily  Patient not taking: Reported on 11/2/2023 9/29/23   Candice Lam MD   hydroCHLOROthiazide (HYDRODIURIL) 12.5 MG tablet Take 1 tablet by mouth daily One tab daily 9/20/23   Candice Lam MD   fluticasone-umeclidin-vilant (TRELEGY ELLIPTA) 778-40.4-77 MCG/ACT AEPB inhaler Inhale 1 puff into the lungs daily 9/20/23   Candice Lam MD   Cholecalciferol (VITAMIN D) 50 MCG (2000 UT) CAPS capsule Take 1 capsule by mouth daily 5/8/23   Candice Lam MD   albuterol sulfate HFA (PROVENTIL;VENTOLIN;PROAIR) 108 (90 Base) MCG/ACT inhaler Inhale 2 puffs into the lungs every 6 hours as needed for Wheezing or Shortness of Breath 5/2/23   Candice Lam MD   ibuprofen (ADVIL;MOTRIN) 800 MG tablet Take 1 tablet by mouth every 8 hours as needed for Pain 2/13/23   Candice Lam MD   Blood Pressure Monitoring (MICROLIFE BPM1 BP MONITOR) KIT 1 kit by Does not apply route daily 11/9/22   Lavshahnaz, Eliseon Fam, APRN - CNP   fluticasone-umeclidin-vilant (TRELEGY ELLIPTA) 100-62.5-25 MCG/INH AEPB Inhale 1 puff into the lungs daily 9/28/22   Candice Lam MD   montelukast (SINGULAIR) 10 MG tablet Take 1 tablet by mouth nightly 5/11/22   Candice Lam MD   fluticasone [facility]) 50 MCG/ACT nasal spray 2 sprays in each nostril daily.   Patient not taking: Reported on 11/2/2023 9/9/20   Candice Lam MD   Handicap Placard MISC One year 7/11/19   SHANIQUA Saldana - CNP     Coumadin Use Last 7 Days:  no  Antiplatelet drug therapy use last 7 days: no  Other anticoagulant use last 7 days: no  Additional Medication Information:  NA      Pre-Sedation Documentation and Exam:   I have reviewed the patient's history and review of systems.     Mallampati Airway Assessment:  Mallampati Class II - (soft palate, fauces & uvula are visible)    Prior History of Anesthesia Complications:   none    ASA Classification:  Class 2 - A normal healthy patient with mild systemic disease    Sedation/ Anesthesia Plan:   intravenous sedation    Medications Planned:   midazolam (Versed) intravenously and fentanyl intravenously    Patient is an appropriate candidate for plan of sedation: yes    Electronically signed by Rosa Isela Palomino MD on 11/3/2023 at 10:12 AM

## 2023-11-03 NOTE — PROGRESS NOTES
Patient back on unit from Bone marrrow biopsy. Vital signs stable. Call light within reach.  No other needs at this time

## 2023-11-03 NOTE — PROGRESS NOTES
Spoke with Dr. Aisha Santoyo, he wants to see the pt in his office on Monday or Tuesday. He will discuss further treatment for B12 at that time. Patient and family made aware and verbalized understanding.

## 2023-11-03 NOTE — PROCEDURES
Colonoscopy Procedure  Note          Patient: Gee Peña  : 1960  CRN:  @SCE@    Procedure: Colonoscopy with     Date:  11/3/2023    Surgeon:  Ck Stevenson MD, MD    Referring Physician:  Kenyatta Patel MD    Preoperative Diagnosis:  Anemia, unspecified type [D64.9]    Postoperative Diagnosis: 1 diverticulosis of the sigmoid colon #2 small internal hemorrhoids #3 otherwise normal colonoscopy #4 normal terminal ileum    Anesthesia:  MAC    EBL: Minimal to none. Indications: This is a 66y.o. year old female who inpatient at [facility] she came in with a hemoglobin of 5 we did upper endoscopy today and were doing a colonoscopy to evaluate why she has such a low hemoglobin    Procedure: An informed consent was obtained from the patient after explanation of indications, benefits, possible risks and complications of the procedure. The patient was then taken to the endoscopy suite, placed in the left lateral decubitus position, and the above IV anesthesia was administered. Digital rectal examination was performed. No masses good rectal tone      Rectum retroflexion attempted multiple times but for some reason we could not get a good retroflexion slow pull-through did reveal some small internal hemorrhoids    Sigmoid diverticulosis    Descending normal    Transverse normal    Ascending normal    Cecum normal    TI normal for for 15 cm there is no reason for the patient to have vitamin B12 problem here at the TI    Prep was excellent      The patient tolerated the procedure well and was taken to the PACU in good condition. There were no immediate complications. Impression: Diverticulosis  Internal hemorrhoids    Recommendations:  In the colonoscopy to find no reason for the blood loss anemia    Therefore I do agree with the hematology service that there is a high likelihood that this is a bone marrow process    The patient does not need to follow-up colonoscopy for 10

## 2023-11-03 NOTE — PROCEDURES
Endoscopy Note    Patient: uSdhakar Cleaning  : 1960  CSN:     Procedure: Esophagogastroduodenoscopy with biopsy    Date:  11/3/2023     Surgeon:  Orquidea Barrios MD     Referring Physician:      Preoperative Diagnosis: Severe anemia    Postoperative Diagnosis: #1 no overt abnormalities esophagus number two 1 to 2 cm hiatal hernia #3 gastritis #4 a duodenal bulb polyp removed    Anesthesia: Monitored anesthesia care    EBL: <5 mL    Indications: This is a 66y.o. year old female who came to the hospital she had a hemoglobin of approximately 5 and she has been given blood transfusions are working her up for GI blood loss she also has a severe vitamin B12 deficiency and she does have a low platelet count and low white count hematology is working with her also    Description of Procedure:  Informed consent was obtained from the patient after explanation of indications, benefits and possible risks and complications of the procedure. The patient was then taken to the endoscopy suite, placed in the left lateral decubitus position and the above IV sedation was administrered. The Olympus videoendoscope was passed through the hypopharynx     Posterior pharynx was normal    Esophagus was normal    Hiatal hernia 2 cm    Stomach was inflamed we did take several biopsies also we want to make sure no biopsy because of vit B12 deficiency the patient does not have the look of an atrophic gastritis or an autoimmune gastritis    Duodenum was normal but there was a polyp in the duodenal bulb which I did removed with a biopsy forceps    Retroflexion showed the hiatal hernia          Gastric or Duodenal ulcer present: No      The patient tolerated the procedure well and was taken to the post anesthesia care unit in good condition. Impression: #1 gastritis #2 hiatal hernia #3 polyp in the duodenum      Recommendations:  This time we will need to wait the biopsy result  But I do not see anything that would be causing her

## 2023-11-03 NOTE — H&P
Gastroenterology Note             Pre-operative History and Physical    Patient: Rena Notice  : 1960  CSN:     History Obtained From:  patient and/or guardian. HISTORY OF PRESENT ILLNESS:    The patient is a 66 y.o. female  here for EGD/colonoscopy. Is a very pleasant 47-year-old female who had the pleasure of seeing yesterday she is an inpatient at [facility] she came in with a hemoglobin of 5 and she has a very low vitamin B12 but also trying to make sure that she has no GI source   She also does have a low platelet count  And low white count      Past Medical History:    Past Medical History:   Diagnosis Date    Asthma     Chronic back pain     COPD (chronic obstructive pulmonary disease) ([address])     confirmed with PFT     HTN, goal below 140/90     Hyperthyroidism     Grave's dz    Obesity (BMI 30-39.9) 2019    Red eyes     Restrictive lung disease     secondary to weight    Surgical menopause     LMP at age 39     Past Surgical History:    Past Surgical History:   Procedure Laterality Date    CT BONE MARROW BIOPSY  11/3/2023    CT BONE MARROW BIOPSY 11/3/2023 [facility] CT SCAN    HERNIA REPAIR      20 years ago     HYSTERECTOMY (CERVIX STATUS UNKNOWN)      10 years ago; fibroid    US BREAST BIOPSY W LOC DEVICE 1ST LESION RIGHT Right 2023    US BREAST BIOPSY W LOC DEVICE 1ST LESION RIGHT 2023 [facility] ULTRASOUND     Medications Prior to Admission:   No current facility-administered medications on file prior to encounter.      Current Outpatient Medications on File Prior to Encounter   Medication Sig Dispense Refill    methIMAzole (TAPAZOLE) 5 MG tablet TAKE 1 TABLET BY MOUTH EVERY OTHER DAY 45 tablet 2    cetirizine (ZYRTEC) 10 MG tablet Take 1 tablet by mouth daily 30 tablet 5    Semaglutide (RYBELSUS) 3 MG TABS Take 1 tablet by mouth daily (Patient not taking: Reported on 2023) 30 tablet 0    hydroCHLOROthiazide (HYDRODIURIL) 12.5 MG tablet Take 1 tablet by

## 2023-11-03 NOTE — ANESTHESIA PRE PROCEDURE
Department of Anesthesiology  Preprocedure Note       Name:  Niki Roberson   Age:  66 y.o.  :  1960                                          MRN:  [id number]         Date:  11/3/2023      Surgeon: Arden Duarte):  Nam Richard MD    Procedure: Procedure(s):  EGD DIAGNOSTIC ONLY  COLONOSCOPY DIAGNOSTIC    Medications prior to admission:   Prior to Admission medications    Medication Sig Start Date End Date Taking?  Authorizing Provider   methIMAzole (TAPAZOLE) 5 MG tablet TAKE 1 TABLET BY MOUTH EVERY OTHER DAY 23   Linda Ashley MD   cetirizine (ZYRTEC) 10 MG tablet Take 1 tablet by mouth daily 23  Cezar Todd MD   Semaglutide (RYBELSUS) 3 MG TABS Take 1 tablet by mouth daily  Patient not taking: Reported on 2023   Cezar Todd MD   hydroCHLOROthiazide (HYDRODIURIL) 12.5 MG tablet Take 1 tablet by mouth daily One tab daily 23   Cezar Todd MD   fluticasone-umeclidin-vilant (TRELEGY ELLIPTA) 199-99.2-71 MCG/ACT AEPB inhaler Inhale 1 puff into the lungs daily 23   Cezar Todd MD   Cholecalciferol (VITAMIN D) 50 MCG (2000 UT) CAPS capsule Take 1 capsule by mouth daily 23   Cezar Todd MD   albuterol sulfate HFA (PROVENTIL;VENTOLIN;PROAIR) 108 (90 Base) MCG/ACT inhaler Inhale 2 puffs into the lungs every 6 hours as needed for Wheezing or Shortness of Breath 23   Cezar Todd MD   ibuprofen (ADVIL;MOTRIN) 800 MG tablet Take 1 tablet by mouth every 8 hours as needed for Pain 23   Cezar Todd MD   Blood Pressure Monitoring (MICROLIFE BPM1 BP MONITOR) KIT 1 kit by Does not apply route daily 22   Adrienne French APRN - MONICA   fluticasone-umeclidin-vilant (TRELEGY ELLIPTA) 100-62.5-25 MCG/INH AEPB Inhale 1 puff into the lungs daily 22   Cezar Todd MD   montelukast (SINGULAIR) 10 MG tablet Take 1 tablet by mouth nightly 22   Cezar Todd MD

## 2023-11-03 NOTE — PROGRESS NOTES
Awake, denies pain ,not passing flatus ,vss,Criteria met to transfer back to room 5573,5 T notified .

## 2023-11-04 LAB
BACTERIA BLD CULT ORG #2: NORMAL
BACTERIA UR CULT: ABNORMAL
HBV CORE AB SERPL QL IA: NEGATIVE
ORGANISM: ABNORMAL

## 2023-11-05 ASSESSMENT — ENCOUNTER SYMPTOMS
CHANGE IN BOWEL HABIT: 0
NAUSEA: 0
SORE THROAT: 0
SWOLLEN GLANDS: 0

## 2023-11-06 ENCOUNTER — TELEPHONE (OUTPATIENT)
Dept: INTERNAL MEDICINE CLINIC | Age: 63
End: 2023-11-06

## 2023-11-06 ENCOUNTER — CARE COORDINATION (OUTPATIENT)
Dept: OTHER | Facility: CLINIC | Age: 63
End: 2023-11-06

## 2023-11-06 LAB
BACTERIA BLD CULT: NORMAL
IF BLOCK AB SER QL RIA: POSITIVE

## 2023-11-06 NOTE — TELEPHONE ENCOUNTER
Care Transitions Initial Follow Up Call    Outreach made within 2 business days of discharge: Yes    Patient: Dany Scott Patient : 1960   MRN: [id number]  Reason for Admission: There are no discharge diagnoses documented for the most recent discharge. Discharge Date: 11/3/23       Spoke with: Pt    Discharge department/facility: [facility] Patient Contact:  Was patient able to fill all prescriptions: Yes  Was patient instructed to bring all medications to the follow-up visit: Yes  Is patient taking all medications as directed in the discharge summary?  Yes  Does patient understand their discharge instructions: Yes  Does patient have questions or concerns that need addressed prior to 7-14 day follow up office visit: no    Scheduled appointment within 7-14 days    Follow Up  Future Appointments   Date Time Provider [address] [place] [address]   2023 11:00 AM SHANIQUA Bhagat - CNP [facility] [place] [facility]   2024 10:30 AM Home Figueroa MD F [facility]   5/3/2024 12:10 PM MD Claudia Huitron RN

## 2023-11-06 NOTE — CARE COORDINATION
Care Transitions Outreach Attempt    Call within 2 business days of discharge: Yes   Attempted to reach patient for transitions of care follow up. Unable to reach patient. Patient: Ciro Michele Patient : 1960 MRN: [id number]    Last Discharge Facility       Date Complaint Diagnosis Description Type Department Provider    23 Abnormal Lab Anemia, unspecified type ED to Hosp-Admission (Discharged) (ADMITTED) [facility] Chris Humphreys MD; Polic[facility]... Was this an external facility discharge? No Discharge Facility Name: [facility]    Noted following upcoming appointments from discharge chart review:   [facility] follow up appointment(s):   Future Appointments   Date Time Provider [address]   2023 11:00 AM SHANIQUA Freitas - CNP [facility] [place] [facility]   2024 10:30 AM MD [facility] [place] [facility]   5/3/2024 12:10 PM MD Ed Nunez [facility]     Non-[facility]  follow up appointment(s): unknown    ACM attempted to reach patient for introduction to Associate Care Management related to admission for symptomatic anemia. HIPAA compliant message left requesting a return phone call at patient convenience.      Plan for follow-up call in 1-2 days

## 2023-11-07 ENCOUNTER — CARE COORDINATION (OUTPATIENT)
Dept: OTHER | Facility: CLINIC | Age: 63
End: 2023-11-07

## 2023-11-07 ENCOUNTER — OFFICE VISIT (OUTPATIENT)
Dept: INTERNAL MEDICINE CLINIC | Age: 63
End: 2023-11-07

## 2023-11-07 VITALS
OXYGEN SATURATION: 94 % | WEIGHT: 176 LBS | SYSTOLIC BLOOD PRESSURE: 120 MMHG | DIASTOLIC BLOOD PRESSURE: 60 MMHG | HEART RATE: 80 BPM | BODY MASS INDEX: 31.18 KG/M2

## 2023-11-07 DIAGNOSIS — Z09 HOSPITAL DISCHARGE FOLLOW-UP: Primary | ICD-10-CM

## 2023-11-07 DIAGNOSIS — D70.8 OTHER NEUTROPENIA (HCC): ICD-10-CM

## 2023-11-07 DIAGNOSIS — D51.0 VITAMIN B12 DEFICIENCY ANEMIA DUE TO INTRINSIC FACTOR DEFICIENCY: ICD-10-CM

## 2023-11-07 LAB
BACTERIA BLD CULT ORG #2: NORMAL
PCA IGG SER QL IF: 33 UNITS (ref 0–24.9)

## 2023-11-07 ASSESSMENT — ANXIETY QUESTIONNAIRES
3. WORRYING TOO MUCH ABOUT DIFFERENT THINGS: 0
4. TROUBLE RELAXING: 0
6. BECOMING EASILY ANNOYED OR IRRITABLE: 0
1. FEELING NERVOUS, ANXIOUS, OR ON EDGE: 0
5. BEING SO RESTLESS THAT IT IS HARD TO SIT STILL: 0
IF YOU CHECKED OFF ANY PROBLEMS ON THIS QUESTIONNAIRE, HOW DIFFICULT HAVE THESE PROBLEMS MADE IT FOR YOU TO DO YOUR WORK, TAKE CARE OF THINGS AT HOME, OR GET ALONG WITH OTHER PEOPLE: NOT DIFFICULT AT ALL
GAD7 TOTAL SCORE: 0
2. NOT BEING ABLE TO STOP OR CONTROL WORRYING: 0
7. FEELING AFRAID AS IF SOMETHING AWFUL MIGHT HAPPEN: 0

## 2023-11-07 ASSESSMENT — PATIENT HEALTH QUESTIONNAIRE - PHQ9
2. FEELING DOWN, DEPRESSED OR HOPELESS: 0
SUM OF ALL RESPONSES TO PHQ9 QUESTIONS 1 & 2: 0
SUM OF ALL RESPONSES TO PHQ QUESTIONS 1-9: 0
1. LITTLE INTEREST OR PLEASURE IN DOING THINGS: 0

## 2023-11-07 NOTE — PATIENT INSTRUCTIONS
Avoid large crowds, wear a masks when out of the home  Avoid taking NASIDS, wbc is low   Continue to drink plenty of water

## 2023-11-07 NOTE — CARE COORDINATION
Care Transitions Outreach Attempt    Call within 2 business days of discharge: Yes   Attempted to reach patient for transitions of care follow up. Unable to reach patient. Patient: Dorian Zarate Patient : 1960 MRN: [id number]    Last Discharge Facility       Date Complaint Diagnosis Description Type Department Provider    23 Abnormal Lab Anemia, unspecified type ED to Hosp-Admission (Discharged) (ADMITTED) [facility] Aaron Eaton MD; [facility]... Was this an external facility discharge? No Discharge Facility Name: [facility]    Noted following upcoming appointments from discharge chart review:   [facility] follow up appointment(s):   Future Appointments   Date Time Provider [address]   2024 10:30 AM MD CON [place] [facility] [place]   5/3/2024 12:10 PM MD Uziel Abbott [facility]     Non-[facility]  follow up appointment(s): unknown    ACM attempted 2nd outreach to patient for introduction to Associate Care Management related to admission for symptomatic anemia. HIPAA compliant message left requesting a return phone call at patient convenience. Unable to Reach Letter sent to patient via [employer]. Will continue to outreach.

## 2023-11-07 NOTE — PROGRESS NOTES
Post-Discharge Transitional Care  Follow Up      Junior Zamora   YOB: 1960    Date of Office Visit:  11/7/2023  Date of Hospital Admission: 11/2/23  Date of Hospital Discharge: 11/3/23  Risk of hospital readmission (high >=14%. Medium >=10%) :Readmission Risk Score: 12.2      Care management risk score Rising risk (score 2-5) and Complex Care (Scores >=6): No Risk Score On File     Non face to face  following discharge, date last encounter closed (first attempt may have been earlier): 11/07/2023    Call initiated 2 business days of discharge: Yes    ASSESSMENT/PLAN:   Kevin Posada was seen today for follow-up from hospital.    Diagnoses and all orders for this visit:    Hospital discharge follow-up  -     [place] DISCHARGE MEDS RECONCILED W/ CURRENT OUTPATIENT MED LIST    Vitamin B12 deficiency anemia due to intrinsic factor deficiency    Other neutropenia ([address])     Avoid large crowds, wear a masks when out of the home  Avoid taking NASIDS, wbc is low   Continue to drink plenty of water    Medical Decision Making: low complexity  Return in 2 months (on 1/7/2024). On this date 11/7/2023 I have spent 10 minutes reviewing previous notes, test results and face to face with the patient discussing the diagnosis and importance of compliance with the treatment plan as well as documenting on the day of the visit. Subjective:   HPI:  Follow up of Hospital problems/diagnosis(es): anemia    Inpatient course: Discharge summary reviewed- see chart.     Interval history/Current status: stable    Patient Active Problem List   Diagnosis    Hyperthyroidism    Graves' disease    Asthma    Restrictive lung disease    COPD (chronic obstructive pulmonary disease) ([address])    Seasonal allergic rhinitis due to pollen    PND (post-nasal drip)    Phlegm in throat    Sleep apnea in adult    Non morbid obesity, unspecified obesity type    Obesity (BMI 30-39.9)    Episcleritis of right eye    Type 2 diabetes mellitus

## 2023-11-08 NOTE — ED PROVIDER NOTES
as an analyte specific reagent (ASR). These tests were developed and                  their performance characteristics determined by the clinical Laboratories                  of [address] [place] [address] has determined that such clearance                  or approval is not necessary. D. CLOT SECTION: Clot section is composed of primarily blood. SPECIAL STUDIES: Iron stain is performed on aspirate smear and biopsy                  section. There is adequate (1+)histiocytic iron storage with no ringed                  sideroblasts. Retic stain is performed on the biopsy. There is mild (1+)                  significant reticulin fibrosis. PAS stain is performed biopsy and it                  shows scattered megakaryocytes. Controls stained appropriately. Flow cytometric analysis ([id number]) shows no phenotypically abnormal                  cell population.                                                                         CPT: N5164849 X1   [id number] X1   [id number] X2   [id number] X5   [id number] X1   [id number] X1                                    Case signed out at [facility], [address], [address] [place] [address] [address] processing at [address]  Phone [phone]                                                                        Juliocesar Pedraza M.D., PH.D.                  (Electronic Signature)                  11/06/2023                                                               Page 1 of 1   SURGICAL PATHOLOGY    Narrative:                                          [facility]                                                       [address], [address] [place] [address]

## 2023-11-10 ENCOUNTER — CARE COORDINATION (OUTPATIENT)
Dept: OTHER | Facility: CLINIC | Age: 63
End: 2023-11-10

## 2023-11-10 NOTE — CARE COORDINATION
Final Transition of Care Outreach Attempt     ACM attempted to reach patient for final Care Transitions call. HIPAA compliant message left requesting a return phone call at patient convenience. Final Unable to Reach Letter sent via [employer]. Information sent to patient via [employer]:    Nurse Vielka  Right Care, Right Place, Right Time  HR apps     No further outreach scheduled with this ACM, patient has been provided with this ACM's contact information. ACM will sign off care team at this time. Episode of care resolved.       Future Appointments   Date Time Provider [address]   1/26/2024 10:30 AM Carli Dorsey MD  [facility]   5/3/2024 12:10 PM Ham Gomez MD [facility]

## 2023-11-27 RX ORDER — MULTIVIT-MIN/IRON/FOLIC ACID/K 18-600-40
1 CAPSULE ORAL DAILY
Qty: 30 CAPSULE | Refills: 11 | Status: SHIPPED | OUTPATIENT
Start: 2023-11-27

## 2023-11-27 RX ORDER — CETIRIZINE HYDROCHLORIDE 10 MG/1
10 TABLET ORAL DAILY
Qty: 30 TABLET | Refills: 5 | OUTPATIENT
Start: 2023-11-27 | End: 2024-05-25

## 2023-11-27 RX ORDER — CETIRIZINE HYDROCHLORIDE 10 MG/1
10 TABLET ORAL DAILY
Qty: 30 TABLET | Refills: 5 | Status: SHIPPED | OUTPATIENT
Start: 2023-11-27 | End: 2024-05-25

## 2023-12-27 DIAGNOSIS — E11.9 TYPE 2 DIABETES MELLITUS WITHOUT COMPLICATION, WITHOUT LONG-TERM CURRENT USE OF INSULIN (HCC): Primary | ICD-10-CM

## 2023-12-27 RX ORDER — ORAL SEMAGLUTIDE 3 MG/1
1 TABLET ORAL DAILY
Qty: 30 TABLET | Refills: 0 | Status: SHIPPED | COMMUNITY
Start: 2023-12-27 | End: 2024-02-16 | Stop reason: SDUPTHER

## 2024-01-19 LAB
Lab: NORMAL
REPORT: NORMAL
THIS TEST SENT TO: NORMAL

## 2024-01-26 ENCOUNTER — OFFICE VISIT (OUTPATIENT)
Dept: INTERNAL MEDICINE CLINIC | Age: 64
End: 2024-01-26
Payer: COMMERCIAL

## 2024-01-26 VITALS
SYSTOLIC BLOOD PRESSURE: 128 MMHG | WEIGHT: 182.8 LBS | HEART RATE: 64 BPM | HEIGHT: 63 IN | TEMPERATURE: 97.1 F | DIASTOLIC BLOOD PRESSURE: 80 MMHG | OXYGEN SATURATION: 96 % | RESPIRATION RATE: 18 BRPM | BODY MASS INDEX: 32.39 KG/M2

## 2024-01-26 DIAGNOSIS — J44.9 CHRONIC OBSTRUCTIVE PULMONARY DISEASE, UNSPECIFIED COPD TYPE (HCC): Chronic | ICD-10-CM

## 2024-01-26 DIAGNOSIS — J45.40 MODERATE PERSISTENT ASTHMA WITHOUT COMPLICATION: Primary | ICD-10-CM

## 2024-01-26 DIAGNOSIS — D89.9 PERNICIOUS ANEMIA DUE TO AUTOIMMUNE DISORDER (HCC): ICD-10-CM

## 2024-01-26 DIAGNOSIS — D51.0 PERNICIOUS ANEMIA DUE TO AUTOIMMUNE DISORDER (HCC): ICD-10-CM

## 2024-01-26 PROCEDURE — 99214 OFFICE O/P EST MOD 30 MIN: CPT | Performed by: INTERNAL MEDICINE

## 2024-01-26 RX ORDER — FLUTICASONE FUROATE, UMECLIDINIUM BROMIDE AND VILANTEROL TRIFENATATE 100; 62.5; 25 UG/1; UG/1; UG/1
1 POWDER RESPIRATORY (INHALATION) DAILY
Qty: 3 EACH | Refills: 0 | Status: SHIPPED | COMMUNITY
Start: 2024-01-26

## 2024-01-26 ASSESSMENT — ANXIETY QUESTIONNAIRES
7. FEELING AFRAID AS IF SOMETHING AWFUL MIGHT HAPPEN: 0
5. BEING SO RESTLESS THAT IT IS HARD TO SIT STILL: 0
1. FEELING NERVOUS, ANXIOUS, OR ON EDGE: 0
2. NOT BEING ABLE TO STOP OR CONTROL WORRYING: 0
6. BECOMING EASILY ANNOYED OR IRRITABLE: 0
4. TROUBLE RELAXING: 0
IF YOU CHECKED OFF ANY PROBLEMS ON THIS QUESTIONNAIRE, HOW DIFFICULT HAVE THESE PROBLEMS MADE IT FOR YOU TO DO YOUR WORK, TAKE CARE OF THINGS AT HOME, OR GET ALONG WITH OTHER PEOPLE: NOT DIFFICULT AT ALL
3. WORRYING TOO MUCH ABOUT DIFFERENT THINGS: 0
GAD7 TOTAL SCORE: 0

## 2024-01-26 ASSESSMENT — PATIENT HEALTH QUESTIONNAIRE - PHQ9
SUM OF ALL RESPONSES TO PHQ9 QUESTIONS 1 & 2: 0
SUM OF ALL RESPONSES TO PHQ QUESTIONS 1-9: 0
SUM OF ALL RESPONSES TO PHQ QUESTIONS 1-9: 0
2. FEELING DOWN, DEPRESSED OR HOPELESS: 0
1. LITTLE INTEREST OR PLEASURE IN DOING THINGS: 0
SUM OF ALL RESPONSES TO PHQ QUESTIONS 1-9: 0
SUM OF ALL RESPONSES TO PHQ QUESTIONS 1-9: 0

## 2024-01-26 ASSESSMENT — ASTHMA QUESTIONNAIRES
QUESTION_3 LAST FOUR WEEKS HOW OFTEN DID YOUR ASTHMA SYMPTOMS (WHEEZING, COUGHING, SHORTNESS OF BREATH, CHEST TIGHTNESS OR PAIN) WAKE YOU UP AT NIGHT OR EARLIER THAN USUAL IN THE MORNING: 4
QUESTION_4 LAST FOUR WEEKS HOW OFTEN HAVE YOU USED YOUR RESCUE INHALER OR NEBULIZER MEDICATION (SUCH AS ALBUTEROL): 5
QUESTION_5 LAST FOUR WEEKS HOW WOULD YOU RATE YOUR ASTHMA CONTROL: 4
ACT_TOTALSCORE: 23
QUESTION_1 LAST FOUR WEEKS HOW MUCH OF THE TIME DID YOUR ASTHMA KEEP YOU FROM GETTING AS MUCH DONE AT WORK, SCHOOL OR AT HOME: 5
QUESTION_2 LAST FOUR WEEKS HOW OFTEN HAVE YOU HAD SHORTNESS OF BREATH: 5

## 2024-01-26 NOTE — PROGRESS NOTES
Candice Narayan (:  1960) is a 63 y.o. female,Established patient, here for evaluation of the following chief complaint(s):  Follow-up and Asthma         ASSESSMENT/PLAN:  1. Moderate persistent asthma without complication  stable  -     fluticasone-umeclidin-vilant (TRELEGY ELLIPTA) 100-62.5-25 MCG/ACT AEPB inhaler; Inhale 1 puff into the lungs daily, Disp-3 each, R-0Sample    2. Chronic obstructive pulmonary disease, unspecified COPD type (HCC)  -     fluticasone-umeclidin-vilant (TRELEGY ELLIPTA) 100-62.5-25 MCG/ACT AEPB inhaler; Inhale 1 puff into the lungs daily, Disp-3 each, R-0Sample    3. Pernicious anemia due to autoimmune disorder (HCC)  Improved  -  continue vitamin b12 injections    Return in about 4 months (around 2024) for Annual Physical.         Subjective   SUBJECTIVE/OBJECTIVE:  HPI  Asthma:  Current treatment includes ipratropium inhalers, combination beta agonists/steroid inhalers.  Using preventive medication(s) consistently: yes.  Residual symptoms: none.  Patient denies any other symptoms.  She requires her rescue inhaler 2 time(s) per week.      Review of Systems       Objective   Vitals:    24 1030   BP: 128/80   Pulse: 64   Resp: 18   Temp: 97.1 °F (36.2 °C)   SpO2: 96%   Weight: 82.9 kg (182 lb 12.8 oz)   Height: 1.6 m (5' 3\")      Wt Readings from Last 3 Encounters:   24 82.9 kg (182 lb 12.8 oz)   23 79.8 kg (176 lb)   23 77.1 kg (170 lb)     BP Readings from Last 3 Encounters:   24 128/80   23 120/60   23 (!) 118/51     Body mass index is 32.38 kg/m². Facility age limit for growth %monty is 20 years.   Physical Exam  Constitutional:       Appearance: Normal appearance.   HENT:      Right Ear: Tympanic membrane normal.      Nose: Nose normal.   Eyes:      General:         Right eye: No discharge.         Left eye: No discharge.      Extraocular Movements: Extraocular movements intact.      Pupils: Pupils are equal, round, and

## 2024-02-04 ENCOUNTER — HOSPITAL ENCOUNTER (EMERGENCY)
Age: 64
Discharge: HOME | End: 2024-02-04
Payer: COMMERCIAL

## 2024-02-04 ENCOUNTER — APPOINTMENT (OUTPATIENT)
Dept: GENERAL RADIOLOGY | Age: 64
End: 2024-02-04
Payer: COMMERCIAL

## 2024-02-04 VITALS
SYSTOLIC BLOOD PRESSURE: 142 MMHG | RESPIRATION RATE: 18 BRPM | WEIGHT: 182 LBS | OXYGEN SATURATION: 100 % | BODY MASS INDEX: 32.24 KG/M2 | TEMPERATURE: 97.9 F | HEART RATE: 67 BPM | DIASTOLIC BLOOD PRESSURE: 75 MMHG

## 2024-02-04 DIAGNOSIS — S89.92XA INJURY OF LEFT KNEE, INITIAL ENCOUNTER: Primary | ICD-10-CM
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
## 2024-02-04 PROCEDURE — 99283 EMERGENCY DEPT VISIT LOW MDM: CPT

## 2024-02-04 PROCEDURE — 73562 X-RAY EXAM OF KNEE 3: CPT

## 2024-02-04 RX ORDER — IBUPROFEN 600 MG/1
600 TABLET ORAL EVERY 8 HOURS PRN
Qty: 60 TABLET | Refills: 0 | Status: SHIPPED | OUTPATIENT
Start: 2024-02-04 | End: 2024-03-05

## 2024-02-04 NOTE — ED PROVIDER NOTES
[facility] EMERGENCY DEPARTMENT  EMERGENCY DEPARTMENT ENCOUNTER        Pt Name: Candice Narayan  MRN: [id number]  Birthdate 1960  Date of evaluation: 2/4/2024  Provider: Rachael Pinto PA-C  PCP: Cristhian Colon MD  Note Started: 6:21 PM EST 2/4/24      ROCK. I have evaluated this patient.        CHIEF COMPLAINT       Chief Complaint   Patient presents with    Fall     Pt had mechanical fall on Friday, slipped on water and injured L knee.        HISTORY OF PRESENT ILLNESS: 1 or more Elements     History From: patient   Limitations to history : None    Candice Narayan is a 63 y.o. female who presents for evaluation of left knee pain status post mechanical fall that occurred 2 days ago.  Patient states that she slipped on water and twisted the leg.  Has since had pain.  Difficulty with ambulation.  No severe Swelling.  No numbness tingling or weakness distally.  No other injuries or complaints at this time.    Nursing Notes were all reviewed and agreed with or any disagreements were addressed in the HPI.    REVIEW OF SYSTEMS :      Review of Systems   Constitutional:  Negative for appetite change, chills and fever.   HENT:  Negative for congestion and rhinorrhea.    Respiratory:  Negative for cough, shortness of breath and wheezing.    Cardiovascular:  Negative for chest pain.   Gastrointestinal:  Negative for abdominal pain, diarrhea, nausea and vomiting.   Genitourinary:  Negative for difficulty urinating, dysuria and hematuria.   Musculoskeletal:  Positive for arthralgias (L knee) and gait problem. Negative for neck pain and neck stiffness.   Skin:  Negative for rash.   Neurological:  Negative for weakness, numbness and headaches.       Positives and Pertinent negatives as per HPI.     SURGICAL HISTORY     Past Surgical History:   Procedure Laterality Date    COLONOSCOPY N/A 11/3/2023    COLONOSCOPY DIAGNOSTIC performed by Alejandro Terrazas MD at [facility] ENDOSCOPY    CT BONE MARROW

## 2024-02-05 ENCOUNTER — CARE COORDINATION (OUTPATIENT)
Dept: OTHER | Facility: CLINIC | Age: 64
End: 2024-02-05

## 2024-02-05 NOTE — CARE COORDINATION
Ambulatory Care Coordination Note    ACM attempted to reach patient for introduction to Associate Care Management related to ED visit 2/4/2024. HIPAA compliant message left requesting a return phone call at patient convenience.     Plan for follow-up call in 1-2 days      Future Appointments   Date Time Provider Department Center   2/7/2024  9:00 AM Eric Root MD [facility]   2/9/2024  1:40 PM Ev Goff PT [facility]    5/3/2024 12:10 PM Aman Zamora MD [facility]   5/23/2024 10:15 AM Cristhian Colon MD [facility] [place] [facility] [place]   9/30/2024 10:30 AM Cristhian Colon MD [facility] [place] [facility] [place]

## 2024-02-06 ENCOUNTER — CARE COORDINATION (OUTPATIENT)
Dept: OTHER | Facility: CLINIC | Age: 64
End: 2024-02-06

## 2024-02-06 SDOH — HEALTH STABILITY: PHYSICAL HEALTH: ON AVERAGE, HOW MANY MINUTES DO YOU ENGAGE IN EXERCISE AT THIS LEVEL?: 0 MIN

## 2024-02-06 SDOH — HEALTH STABILITY: PHYSICAL HEALTH: ON AVERAGE, HOW MANY DAYS PER WEEK DO YOU ENGAGE IN MODERATE TO STRENUOUS EXERCISE (LIKE A BRISK WALK)?: 0 DAYS

## 2024-02-06 NOTE — CARE COORDINATION
Ambulatory Care Coordination Note    ACM attempted 2nd outreach to patient for introduction to Associate Care Management related to ED visit 2/4/2024. HIPAA compliant message left requesting a return phone call at patient convenience.     Unable to Reach Letter sent to patient via EdRover.    Will continue to outreach.      Future Appointments   Date Time Provider Department Center   2/7/2024  9:00 AM Eric Root MD [facility]   3/4/2024 11:40 AM Ev Goff PT [facility]    5/3/2024 12:10 PM Aman Zamora MD [facility]   5/23/2024 10:15 AM Cristhian Colon MD [facility] [place]   9/30/2024 10:30 AM Cristhian Colon MD [facility] [place]

## 2024-02-07 ENCOUNTER — OFFICE VISIT (OUTPATIENT)
Dept: ORTHOPEDIC SURGERY | Age: 64
End: 2024-02-07
Payer: COMMERCIAL

## 2024-02-07 VITALS — HEIGHT: 63 IN | RESPIRATION RATE: 16 BRPM | BODY MASS INDEX: 32.25 KG/M2 | WEIGHT: 182 LBS

## 2024-02-07 DIAGNOSIS — M17.12 ARTHRITIS OF LEFT KNEE: ICD-10-CM

## 2024-02-07 DIAGNOSIS — S83.412A SPRAIN OF MEDIAL COLLATERAL LIGAMENT OF LEFT KNEE, INITIAL ENCOUNTER: Primary | ICD-10-CM

## 2024-02-07 PROCEDURE — 99204 OFFICE O/P NEW MOD 45 MIN: CPT | Performed by: ORTHOPAEDIC SURGERY

## 2024-02-07 PROCEDURE — L1832 KO ADJ JNT POS R SUP PRE CST: HCPCS | Performed by: ORTHOPAEDIC SURGERY

## 2024-02-07 RX ORDER — METHYLPREDNISOLONE 4 MG/1
TABLET ORAL
Qty: 1 KIT | Refills: 0 | Status: SHIPPED | OUTPATIENT
Start: 2024-02-07 | End: 2024-02-13

## 2024-02-07 NOTE — PROGRESS NOTES
body weight.    5)  Physical therapy referral with transition to home program, focusing on strengthening, low impact aerobic exercises, and neuromuscular education (i.e. balance, agility, coordination).  6)  Intra-articular corticosteroid injections are an option.  Corticosteroid injections can be performed every 4 months as needed.  7)  Bracing and cane use can improve pain and function.  T-scope fitted today.  8)  Ice therapy and topical patches such as Salonpas are good options as well.    Medrol Dosepak prescribed.  Use a knee T scope as needed for support, fully unlocked.  Advance activities as tolerated  Formal PT  Follow-up in 1 month for clinical checkup.    Eric Root MD

## 2024-02-14 ENCOUNTER — CARE COORDINATION (OUTPATIENT)
Dept: OTHER | Facility: CLINIC | Age: 64
End: 2024-02-14

## 2024-02-14 NOTE — CARE COORDINATION
Ambulatory Care Coordination Note    ACM attempted third and final call to patient for introduction to Associate Care Management. HIPAA compliant message left requesting a return phone call at patient convenience.    Final Unable to Reach Letter sent to patient via Digital Theatre.    No further outreach scheduled with this ACM, patient has been provided with this ACM's contact information.  ACM will sign off care team in 5-7 days if no response.     Future Appointments   Date Time Provider Department Center   3/4/2024 11:40 AM Ev Goff PT [facility] [place]    3/8/2024 10:45 AM Eric Root MD [facility]   5/3/2024 12:10 PM Aman Zamora MD [facility]   5/23/2024 10:15 AM Cristhian Colon MD [facility] [place] [facility] [place]   9/30/2024 10:30 AM Cristhian Colon MD [facility] [place] [facility] [place]

## 2024-02-16 ENCOUNTER — CARE COORDINATION (OUTPATIENT)
Dept: OTHER | Facility: CLINIC | Age: 64
End: 2024-02-16

## 2024-02-16 NOTE — CARE COORDINATION
Patient responded via BlackbookHR message. Patient declined care management at this time.   No further outreach scheduled with this ACM, ACM will sign off care team at this time. Program status updated to reflect closed.  Patient  has this ACM's contact information if future needs arise.

## 2024-02-21 DIAGNOSIS — E05.90 HYPERTHYROIDISM: ICD-10-CM

## 2024-02-21 DIAGNOSIS — M51.36 DDD (DEGENERATIVE DISC DISEASE), LUMBAR: ICD-10-CM

## 2024-02-21 RX ORDER — IBUPROFEN 800 MG/1
800 TABLET ORAL EVERY 8 HOURS PRN
Qty: 90 TABLET | Refills: 5 | Status: SHIPPED | OUTPATIENT
Start: 2024-02-21

## 2024-02-22 RX ORDER — METHIMAZOLE 5 MG/1
TABLET ORAL
Qty: 45 TABLET | Refills: 2 | Status: SHIPPED | OUTPATIENT
Start: 2024-02-22

## 2024-02-22 NOTE — TELEPHONE ENCOUNTER
Medication:   Requested Prescriptions     Pending Prescriptions Disp Refills    methIMAzole (TAPAZOLE) 5 MG tablet 45 tablet 2     Sig: TAKE 1 TABLET BY MOUTH EVERY OTHER DAY       Last Filled:      Patient Phone Number: [phone] (home)     Last appt: 11/1/2023   Next appt: 5/3/2024    Last Thyroid:   Lab Results   Component Value Date/Time    TSH 0.08 09/18/2020 10:59 AM    FT3 2.5 11/01/2023 10:56 AM    O8PEYTX 1.18 09/21/2022 02:48 PM    T4FREE 1.4 11/01/2023 10:56 AM

## 2024-03-04 ENCOUNTER — HOSPITAL ENCOUNTER (OUTPATIENT)
Dept: PHYSICAL THERAPY | Age: 64
Setting detail: THERAPIES SERIES
Discharge: HOME | End: 2024-03-04
Attending: INTERNAL MEDICINE

## 2024-03-04 DIAGNOSIS — R26.89 ANTALGIC GAIT: ICD-10-CM

## 2024-03-04 DIAGNOSIS — M25.662 DECREASED ROM OF LEFT KNEE: Primary | ICD-10-CM

## 2024-03-04 DIAGNOSIS — R29.898 DECREASED STRENGTH OF LOWER EXTREMITY: ICD-10-CM

## 2024-03-08 ENCOUNTER — OFFICE VISIT (OUTPATIENT)
Dept: ORTHOPEDIC SURGERY | Age: 64
End: 2024-03-08
Payer: COMMERCIAL

## 2024-03-08 VITALS — RESPIRATION RATE: 12 BRPM | HEIGHT: 63 IN | WEIGHT: 182 LBS | BODY MASS INDEX: 32.25 KG/M2

## 2024-03-08 DIAGNOSIS — M17.12 ARTHRITIS OF LEFT KNEE: ICD-10-CM

## 2024-03-08 DIAGNOSIS — S83.412D SPRAIN OF MEDIAL COLLATERAL LIGAMENT OF LEFT KNEE, SUBSEQUENT ENCOUNTER: Primary | ICD-10-CM

## 2024-03-08 PROCEDURE — 99213 OFFICE O/P EST LOW 20 MIN: CPT | Performed by: PHYSICIAN ASSISTANT

## 2024-03-08 NOTE — PROGRESS NOTES
ORTHOPAEDIC NEW PATIENT NOTE    Chief Complaint   Patient presents with    Follow-up     L knee        HPI  3/8/24  FU left knee MCL sprain/arthritis  Pain improved since last visit, thinks it is mostly because of the Ibuprofen she is she is taking rather than the Medrol Dosepak that was prescribed  Still has some throbbing pain over the medial aspect, worse with start up activities  She states that when she lays down on her right side, it feels good to let her left leg hang off the bed (essentially causing varus stress)  Has not started PT yet, first appointment is next week  She has been wearing the T scope while walking, which does help with her pain  No other complaints or concerns      2/7/24  63 y.o. female seen for evaluation of left knee injury:  Pain onset acute on chronic  Patient reports intermittent pain in the left knee for a while  However she injured her left knee on February 2, when she slipped on water at home and fell  She thinks her left knee went inwards  Immediate pain and swelling  She did not seek treatment right away as she thought the symptoms would improve  However as the pain was severe, she went to the emergency department few days after and had radiographs performed  She was given a knee immobilizer and referred here  Pain described mainly anteromedial  Again associated with swelling  She has been using ibuprofen without much success  Pain is described as throbbing  She reports pain with attempted range of motion and flexion  She reports her range of motion has improved since the injury        Allergies   Allergen Reactions    Azithromycin Hives        Current Outpatient Medications   Medication Sig Dispense Refill    methIMAzole (TAPAZOLE) 5 MG tablet TAKE 1 TABLET BY MOUTH EVERY OTHER DAY 45 tablet 2    ibuprofen (ADVIL;MOTRIN) 800 MG tablet Take 1 tablet by mouth every 8 hours as needed for Pain 90 tablet 5    Semaglutide (RYBELSUS) 3 MG TABS Take 1 tablet by mouth daily 30 tablet 0

## 2024-03-09 DIAGNOSIS — E11.9 TYPE 2 DIABETES MELLITUS WITHOUT COMPLICATION, WITHOUT LONG-TERM CURRENT USE OF INSULIN (HCC): ICD-10-CM

## 2024-03-11 RX ORDER — ORAL SEMAGLUTIDE 3 MG/1
1 TABLET ORAL DAILY
Qty: 30 TABLET | Refills: 0 | Status: SHIPPED | OUTPATIENT
Start: 2024-03-11 | End: 2024-03-13 | Stop reason: CLARIF

## 2024-03-13 ENCOUNTER — TELEPHONE (OUTPATIENT)
Dept: INTERNAL MEDICINE CLINIC | Age: 64
End: 2024-03-13

## 2024-03-13 NOTE — TELEPHONE ENCOUNTER
Semaglutide (RYBELSUS) 3 MG TABS  Date: 3/11/2024 Department: [facility] Ordering/Authorizing: Cristhian Colon MD     Outpatient Medication Detail     Disp Refills Start End    Semaglutide (RYBELSUS) 3 MG TABS 30 tablet 0 3/11/2024 --    Sig - Route: Take 1 tablet by mouth daily - Oral    Sent to pharmacy as: Rybelsus 3 MG Oral Tablet (Semaglutide)    E-Prescribing Status: Receipt confirmed by pharmacy (3/11/2024  8:57 AM EDT)        Pt says supposed to be 7mg    Please advise

## 2024-03-14 NOTE — TELEPHONE ENCOUNTER
36.8 Patient is requesting a refill of their prescription.     Requested Prescriptions     Pending Prescriptions Disp Refills    cetirizine (ZYRTEC) 10 MG tablet 30 tablet 3     Sig: Take 1 tablet by mouth daily        Recent Visits  Date Type Provider Dept   09/10/21 Office Visit Fabio Valenzuela MD [facility]   01/08/21 Office Visit Fabio Valenzuela MD [facility]   09/09/20 Office Visit Fabio Valenzuela MD [facility]   Showing recent visits within past 540 days with a meds authorizing provider and meeting all other requirements  Future Appointments  Date Type Provider Dept   12/15/21 Appointment Fabio Valenzuela MD [facility]   Showing future appointments within next 150 days with a meds authorizing provider and meeting all other requirements     9/10/2021 36.8

## 2024-03-15 ENCOUNTER — HOSPITAL ENCOUNTER (OUTPATIENT)
Dept: PHYSICAL THERAPY | Age: 64
Setting detail: THERAPIES SERIES
Discharge: HOME | End: 2024-03-15
Attending: INTERNAL MEDICINE
Payer: COMMERCIAL

## 2024-03-15 DIAGNOSIS — M25.662 DECREASED RANGE OF MOTION (ROM) OF LEFT KNEE: ICD-10-CM

## 2024-03-15 DIAGNOSIS — R29.898 LEFT LEG WEAKNESS: ICD-10-CM

## 2024-03-15 DIAGNOSIS — Z74.09 IMPAIRED FUNCTIONAL MOBILITY, BALANCE, GAIT, AND ENDURANCE: Primary | ICD-10-CM

## 2024-03-15 PROCEDURE — 97110 THERAPEUTIC EXERCISES: CPT

## 2024-03-15 PROCEDURE — 97161 PT EVAL LOW COMPLEX 20 MIN: CPT

## 2024-03-15 NOTE — PLAN OF CARE
[facility] - [facility] [address], [place] [address] office: [phone] fax: [phone]     Physical Therapy Initial Evaluation Certification      Dear Cristhian Colon, *,    We had the pleasure of evaluating the following patient for physical therapy services at [facility].  A summary of our findings can be found in the initial assessment below.  This includes our plan of care.  If you have any questions or concerns regarding these findings, please do not hesitate to contact me at the office phone number listed above.  Thank you for the referral.     Physician Signature:_______________________________Date:__________________  By signing above (or electronic signature), therapist’s plan is approved by physician       Physical Therapy: TREATMENT/PROGRESS NOTE   Patient: Candice Narayan (63 y.o. female)   Examination Date: 03/15/2024   :  1960 MRN: [id number]   Visit #: 1   Insurance Allowable Auth Needed   [place] []Yes    [x]No    Insurance: Payor: UMR / Plan: UMR / Product Type: *No Product type* /   Insurance ID: [id number] - (Commercial)  Secondary Insurance (if applicable):    Treatment Diagnosis:     ICD-10-CM    1. Impaired functional mobility, balance, gait, and endurance  Z74.09       2. Decreased range of motion (ROM) of left knee  M25.662       3. Left leg weakness  R29.898          Medical Diagnosis:  Sprain of medial collateral ligament of left knee, initial encounter [S83.412A]  Arthritis of left knee [M17.12]   Referring Physician: Cristhian Colon, *  PCP: Cristhian Colon MD       Plan of care signed (Y/N):     Date of Patient follow up with Physician:      Progress Report/POC: EVAL today  POC update due: (10 visits /OR AUTH LIMITS, whichever is less)  2024                                             Precautions/ Contra-indications:           Latex allergy:  NO  Pacemaker:

## 2024-03-22 ENCOUNTER — TELEPHONE (OUTPATIENT)
Dept: INTERNAL MEDICINE CLINIC | Age: 64
End: 2024-03-22

## 2024-03-22 NOTE — TELEPHONE ENCOUNTER
There is not anyone scheduled for 8 am this 05/23/2024 is it ok to move her appt to 8am? Please advise

## 2024-03-22 NOTE — TELEPHONE ENCOUNTER
Please Advise      Pt  appt is on 5/23 she would like to know if she can do a 8:00 because her work schedule has changed

## 2024-04-09 RX ORDER — MULTIVIT-MIN/IRON/FOLIC ACID/K 18-600-40
1 CAPSULE ORAL DAILY
Qty: 30 CAPSULE | Refills: 11 | Status: SHIPPED | OUTPATIENT
Start: 2024-04-09

## 2024-04-09 RX ORDER — ACETAMINOPHEN 160 MG
2000 TABLET,DISINTEGRATING ORAL DAILY
Qty: 30 CAPSULE | Refills: 11 | OUTPATIENT
Start: 2024-04-09

## 2024-04-28 ENCOUNTER — PATIENT MESSAGE (OUTPATIENT)
Dept: ENDOCRINOLOGY | Age: 64
End: 2024-04-28

## 2024-04-29 DIAGNOSIS — E05.90 HYPERTHYROIDISM: Primary | ICD-10-CM

## 2024-04-29 NOTE — TELEPHONE ENCOUNTER
From: Candice Narayan  To: Dr. Aman Zamora  Sent: 4/28/2024 6:17 PM EDT  Subject: Open labs    Hello Was wondering if I had Lab orders in the system for my appt Friday

## 2024-05-03 ENCOUNTER — TELEMEDICINE (OUTPATIENT)
Dept: ENDOCRINOLOGY | Age: 64
End: 2024-05-03
Payer: COMMERCIAL

## 2024-05-03 DIAGNOSIS — E05.90 HYPERTHYROIDISM: Primary | ICD-10-CM

## 2024-05-03 PROCEDURE — 99214 OFFICE O/P EST MOD 30 MIN: CPT | Performed by: INTERNAL MEDICINE

## 2024-05-03 RX ORDER — METHIMAZOLE 5 MG/1
TABLET ORAL
Qty: 45 TABLET | Refills: 2 | Status: SHIPPED | OUTPATIENT
Start: 2024-05-03

## 2024-05-03 NOTE — PROGRESS NOTES
Patient was evaluated through a synchronous (real-time) audio-video encounter. The patient (or guardian if applicable) is aware that this is a billable service, which includes applicable co-pays. This Virtual Visit was conducted with patient's (and/or legal guardian's) consent. Patient identification was verified, and a caregiver was present when appropriate.   The patient was located at Home:   Provider was located at Home     STOP! Confirm you are appropriately licensed, registered, or certified to deliver care in the state where the patient is located as indicated above. If you are not or unsure, please re-schedule the visit.    Are you appropriately licensed in the patient's state?: Yes      KARISSA Narayan is a 63 y.o. female who is here for a follow-up for management of hyperthyroidism.    Interim:    Did not have labs    PCP  Cristhian Colon MD     Has seen Dr. Estevez    She has a PMH of Asthma, hypertension and chronic back pain.    Patient was found to have a low TSH ( <0.01 ) and high FT4 (3.8 ) on labs done on 06/11/14.  Repeat labs confirmed hyperthyroidism.  Thyroid uptake and scan was consistent with Graves disease.    No FH of thyroid disease.    She was started on tapazole 10 mg BID.  She developed a rash and stopped it for 2-3 days.    She restarted it at 10 mg daily. Has to take hydroxyzine with it to control itching.     Took  tapazole  5 mg daily since 04/16---> 06/17    Resumed in 2018. Now on Methimazole 5 mg every other day.     She is experiencing occasional  increased sweating    No palpitations, tremors.   No diarrhea      Weight has been stable.     Reports left eye side issue, seen opthamology  Was not told due to Graves' disease  Has used steroid drops    Thyroid US 06/14 INDICATION: Hyperthyroidism. COMPARISON: None. FINDINGS: Sonography of the thyroid gland was performed. The entire thyroid gland is enlarged and heterogeneous in echo texture. The right lobe measures

## 2024-05-09 ENCOUNTER — PATIENT MESSAGE (OUTPATIENT)
Dept: ENDOCRINOLOGY | Age: 64
End: 2024-05-09

## 2024-05-09 LAB
T3 FREE: 2.58 PG/ML (ref 1.58–3.91)
T4 FREE: 1 NG/DL (ref 0.8–1.8)

## 2024-05-09 NOTE — TELEPHONE ENCOUNTER
From: Candice Narayan  To: Dr. Aman Zamora  Sent: 5/9/2024 1:45 PM EDT  Subject: Lab order     Hello I did have my labs done today at [facility]

## 2024-05-20 ENCOUNTER — OFFICE VISIT (OUTPATIENT)
Dept: INTERNAL MEDICINE CLINIC | Age: 64
End: 2024-05-20
Payer: COMMERCIAL

## 2024-05-20 VITALS
BODY MASS INDEX: 33.27 KG/M2 | DIASTOLIC BLOOD PRESSURE: 72 MMHG | RESPIRATION RATE: 18 BRPM | HEIGHT: 63 IN | SYSTOLIC BLOOD PRESSURE: 118 MMHG | HEART RATE: 58 BPM | OXYGEN SATURATION: 98 % | TEMPERATURE: 97.6 F | WEIGHT: 187.8 LBS

## 2024-05-20 DIAGNOSIS — M51.36 DDD (DEGENERATIVE DISC DISEASE), LUMBAR: ICD-10-CM

## 2024-05-20 DIAGNOSIS — J45.40 MODERATE PERSISTENT ASTHMA WITHOUT COMPLICATION: Primary | ICD-10-CM

## 2024-05-20 DIAGNOSIS — D69.6 THROMBOCYTOPENIA, UNSPECIFIED (HCC): ICD-10-CM

## 2024-05-20 DIAGNOSIS — D89.9 PERNICIOUS ANEMIA DUE TO AUTOIMMUNE DISORDER (HCC): ICD-10-CM

## 2024-05-20 DIAGNOSIS — E11.9 TYPE 2 DIABETES MELLITUS WITHOUT COMPLICATION, WITHOUT LONG-TERM CURRENT USE OF INSULIN (HCC): ICD-10-CM

## 2024-05-20 DIAGNOSIS — J44.9 CHRONIC OBSTRUCTIVE PULMONARY DISEASE, UNSPECIFIED COPD TYPE (HCC): Chronic | ICD-10-CM

## 2024-05-20 DIAGNOSIS — D51.0 PERNICIOUS ANEMIA DUE TO AUTOIMMUNE DISORDER (HCC): ICD-10-CM

## 2024-05-20 PROCEDURE — 99214 OFFICE O/P EST MOD 30 MIN: CPT | Performed by: INTERNAL MEDICINE

## 2024-05-20 PROCEDURE — G2211 COMPLEX E/M VISIT ADD ON: HCPCS | Performed by: INTERNAL MEDICINE

## 2024-05-20 RX ORDER — FLUTICASONE FUROATE, UMECLIDINIUM BROMIDE AND VILANTEROL TRIFENATATE 100; 62.5; 25 UG/1; UG/1; UG/1
1 POWDER RESPIRATORY (INHALATION) DAILY
Qty: 2 EACH | Refills: 0 | Status: SHIPPED | COMMUNITY
Start: 2024-05-20

## 2024-05-20 ASSESSMENT — ANXIETY QUESTIONNAIRES
6. BECOMING EASILY ANNOYED OR IRRITABLE: NOT AT ALL
2. NOT BEING ABLE TO STOP OR CONTROL WORRYING: NOT AT ALL
4. TROUBLE RELAXING: NOT AT ALL
1. FEELING NERVOUS, ANXIOUS, OR ON EDGE: NOT AT ALL
GAD7 TOTAL SCORE: 1
3. WORRYING TOO MUCH ABOUT DIFFERENT THINGS: SEVERAL DAYS
7. FEELING AFRAID AS IF SOMETHING AWFUL MIGHT HAPPEN: NOT AT ALL
5. BEING SO RESTLESS THAT IT IS HARD TO SIT STILL: NOT AT ALL

## 2024-05-20 ASSESSMENT — PATIENT HEALTH QUESTIONNAIRE - PHQ9
SUM OF ALL RESPONSES TO PHQ9 QUESTIONS 1 & 2: 0
SUM OF ALL RESPONSES TO PHQ QUESTIONS 1-9: 0
2. FEELING DOWN, DEPRESSED OR HOPELESS: NOT AT ALL
1. LITTLE INTEREST OR PLEASURE IN DOING THINGS: NOT AT ALL
SUM OF ALL RESPONSES TO PHQ QUESTIONS 1-9: 0

## 2024-05-20 NOTE — PROGRESS NOTES
Candice Narayan (:  1960) is a 63 y.o. female,Established patient, here for evaluation of the following chief complaint(s):  Follow-up      Assessment & Plan   1. Moderate persistent asthma without complication  Improved  -  continue with trelegy    2. Pernicious anemia due to autoimmune disorder (HCC)  Improved  -  continue vitamin b12 injection    3. Thrombocytopenia, unspecified (HCC)  Resolved  Continue to monitor    4. Type 2 diabetes mellitus without complication, without long-term current use of insulin (HCC)  5. Chronic obstructive pulmonary disease, unspecified COPD type (HCC)  -     fluticasone-umeclidin-vilant (TRELEGY ELLIPTA) 100-62.5-25 MCG/ACT AEPB inhaler; Inhale 1 puff into the lungs daily, Disp-2 each, R-0Sample      Return in about 4 months (around 2024) for asthma 30 min.       Subjective   HPI patient comes in for follow-up of pernicious anemia.  She is seeing hematology who is administering vitamin B12 injections.  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  She is not able to absorb B12 through her GI tract.  She will likely require lifelong vitamin B12 injections.  She is feeling better since starting the injections.  She also notes that her asthma is well-controlled.  She is currently on Trelegy.  She has no new concerns.    Review of Systems       Objective   Vitals:    24 1134   BP: 118/72   Pulse: 58   Resp: 18   Temp: 97.6 °F (36.4 °C)   SpO2: 98%   Weight: 85.2 kg (187 lb 12.8 oz)   Height: 1.6 m (5' 3\")      Wt Readings from Last 3 Encounters:   24 85.2 kg (187 lb 12.8 oz)   24 82.6 kg (182 lb)   24 82.6 kg (182 lb)     BP Readings from Last 3 Encounters:   24 118/72   24 (!) 142/75   24 128/80     Body mass index is 33.27 kg/m². Facility age limit for growth %monty is 20 years.   Physical Exam  Constitutional:       General: She is not in acute distress.     Appearance: Normal appearance. She

## 2024-05-21 RX ORDER — IBUPROFEN 800 MG/1
800 TABLET ORAL EVERY 8 HOURS PRN
Qty: 90 TABLET | Refills: 5 | OUTPATIENT
Start: 2024-05-21

## 2024-05-28 NOTE — TELEPHONE ENCOUNTER
Submitted PA for Rybelsus 7MG tablets  [facility]ia [facility]: [id number] STATUS: PENDING. Follow up done daily; if no response in three days we will refax for status check. If another three days goes by with no response we will call the insurance for status. Quality 226: Preventive Care And Screening: Tobacco Use: Screening And Cessation Intervention: Patient screened for tobacco use and is an ex/non-smoker Detail Level: Detailed

## 2024-05-30 DIAGNOSIS — M51.36 DDD (DEGENERATIVE DISC DISEASE), LUMBAR: ICD-10-CM

## 2024-05-31 RX ORDER — IBUPROFEN 800 MG/1
800 TABLET ORAL EVERY 8 HOURS PRN
Qty: 90 TABLET | Refills: 5 | Status: SHIPPED | OUTPATIENT
Start: 2024-05-31

## 2024-06-13 ENCOUNTER — PATIENT MESSAGE (OUTPATIENT)
Dept: INTERNAL MEDICINE CLINIC | Age: 64
End: 2024-06-13

## 2024-06-14 RX ORDER — CETIRIZINE HYDROCHLORIDE 10 MG/1
10 TABLET ORAL DAILY
Qty: 30 TABLET | Refills: 5 | Status: SHIPPED | OUTPATIENT
Start: 2024-06-14 | End: 2024-12-11

## 2024-06-14 NOTE — TELEPHONE ENCOUNTER
From: Candice Narayan  To: Dr. Cristhian Colon  Sent: 6/13/2024 10:47 PM EDT  Subject: Cetirizine    Hello I was needing a refill for the Cetirizine 10 mg to go to [facility]     Thanks

## 2024-07-14 NOTE — TELEPHONE ENCOUNTER
Recent Visits  Date Type Provider Dept   05/20/24 Office Visit Cristhian Colon MD [facility]   01/26/24 Office Visit Cristhian Colon MD [facility]   11/07/23 Office Visit Yanelis French, APRN - CNP [facility]   11/01/23 Office Visit Cristhian Colon MD [facility]   09/29/23 Office Visit Cristhian Colon MD [facility]   03/29/23 Office Visit Cristhian Colon MD [facility]   Showing recent visits within past 540 days with a meds authorizing provider and meeting all other requirements  Future Appointments  Date Type Provider Dept   09/20/24 Appointment Cristhian Colon MD [facility]   09/30/24 Appointment Cristhian Colon MD [facility]   Showing future appointments within next 150 days with a meds authorizing provider and meeting all other requirements     5/20/2024

## 2024-07-15 RX ORDER — CETIRIZINE HYDROCHLORIDE 10 MG/1
10 TABLET ORAL DAILY
Qty: 30 TABLET | Refills: 5 | Status: SHIPPED | OUTPATIENT
Start: 2024-07-15 | End: 2025-01-11

## 2024-08-09 ENCOUNTER — TELEPHONE (OUTPATIENT)
Dept: INTERNAL MEDICINE CLINIC | Age: 64
End: 2024-08-09

## 2024-08-09 DIAGNOSIS — E11.9 TYPE 2 DIABETES MELLITUS WITHOUT COMPLICATION, WITHOUT LONG-TERM CURRENT USE OF INSULIN (HCC): Primary | ICD-10-CM

## 2024-08-09 NOTE — TELEPHONE ENCOUNTER
Amita/ rep from [facility] pharmacy called about the patient's transferred rybelsus 7 mg (take once daily)  from her [facility].  Requesting the diagnosis code to bill the insurance.    Pharmacy Phone: [phone]

## 2024-09-10 DIAGNOSIS — I10 ESSENTIAL HYPERTENSION: ICD-10-CM

## 2024-09-11 RX ORDER — HYDROCHLOROTHIAZIDE 12.5 MG/1
12.5 TABLET ORAL DAILY
Qty: 90 TABLET | Refills: 3 | Status: SHIPPED | OUTPATIENT
Start: 2024-09-11

## 2024-09-20 ENCOUNTER — OFFICE VISIT (OUTPATIENT)
Dept: INTERNAL MEDICINE CLINIC | Age: 64
End: 2024-09-20

## 2024-09-20 VITALS
HEIGHT: 63 IN | RESPIRATION RATE: 18 BRPM | TEMPERATURE: 97.5 F | DIASTOLIC BLOOD PRESSURE: 60 MMHG | OXYGEN SATURATION: 99 % | BODY MASS INDEX: 33.77 KG/M2 | HEART RATE: 66 BPM | SYSTOLIC BLOOD PRESSURE: 116 MMHG | WEIGHT: 190.6 LBS

## 2024-09-20 DIAGNOSIS — J45.40 MODERATE PERSISTENT ASTHMA WITHOUT COMPLICATION: Primary | ICD-10-CM

## 2024-09-20 DIAGNOSIS — E05.00 GRAVES' DISEASE: Chronic | ICD-10-CM

## 2024-09-20 DIAGNOSIS — E11.9 TYPE 2 DIABETES MELLITUS WITHOUT COMPLICATION, WITHOUT LONG-TERM CURRENT USE OF INSULIN (HCC): ICD-10-CM

## 2024-09-20 ASSESSMENT — PATIENT HEALTH QUESTIONNAIRE - PHQ9
SUM OF ALL RESPONSES TO PHQ QUESTIONS 1-9: 0
SUM OF ALL RESPONSES TO PHQ9 QUESTIONS 1 & 2: 0
1. LITTLE INTEREST OR PLEASURE IN DOING THINGS: NOT AT ALL
2. FEELING DOWN, DEPRESSED OR HOPELESS: NOT AT ALL

## 2024-09-20 ASSESSMENT — ANXIETY QUESTIONNAIRES
7. FEELING AFRAID AS IF SOMETHING AWFUL MIGHT HAPPEN: NOT AT ALL
IF YOU CHECKED OFF ANY PROBLEMS ON THIS QUESTIONNAIRE, HOW DIFFICULT HAVE THESE PROBLEMS MADE IT FOR YOU TO DO YOUR WORK, TAKE CARE OF THINGS AT HOME, OR GET ALONG WITH OTHER PEOPLE: NOT DIFFICULT AT ALL
2. NOT BEING ABLE TO STOP OR CONTROL WORRYING: NOT AT ALL
4. TROUBLE RELAXING: NOT AT ALL
1. FEELING NERVOUS, ANXIOUS, OR ON EDGE: NOT AT ALL
3. WORRYING TOO MUCH ABOUT DIFFERENT THINGS: NOT AT ALL
6. BECOMING EASILY ANNOYED OR IRRITABLE: SEVERAL DAYS
GAD7 TOTAL SCORE: 1
5. BEING SO RESTLESS THAT IT IS HARD TO SIT STILL: NOT AT ALL

## 2024-09-20 ASSESSMENT — ASTHMA QUESTIONNAIRES
QUESTION_3 LAST FOUR WEEKS HOW OFTEN DID YOUR ASTHMA SYMPTOMS (WHEEZING, COUGHING, SHORTNESS OF BREATH, CHEST TIGHTNESS OR PAIN) WAKE YOU UP AT NIGHT OR EARLIER THAN USUAL IN THE MORNING: NOT AT ALL
ACT_TOTALSCORE: 23
QUESTION_1 LAST FOUR WEEKS HOW MUCH OF THE TIME DID YOUR ASTHMA KEEP YOU FROM GETTING AS MUCH DONE AT WORK, SCHOOL OR AT HOME: NONE OF THE TIME
QUESTION_2 LAST FOUR WEEKS HOW OFTEN HAVE YOU HAD SHORTNESS OF BREATH: ONCE OR TWICE A WEEK
QUESTION_5 LAST FOUR WEEKS HOW WOULD YOU RATE YOUR ASTHMA CONTROL: WELL CONTROLLED
QUESTION_4 LAST FOUR WEEKS HOW OFTEN HAVE YOU USED YOUR RESCUE INHALER OR NEBULIZER MEDICATION (SUCH AS ALBUTEROL): NOT AT ALL

## 2024-09-30 ENCOUNTER — PATIENT MESSAGE (OUTPATIENT)
Dept: INTERNAL MEDICINE CLINIC | Age: 64
End: 2024-09-30

## 2024-10-08 ENCOUNTER — TELEPHONE (OUTPATIENT)
Dept: INTERNAL MEDICINE CLINIC | Age: 64
End: 2024-10-08

## 2024-10-08 NOTE — TELEPHONE ENCOUNTER
----- Message from Fabian FELTON sent at 10/8/2024  1:44 PM EDT -----  Regarding: [facility] Appointment Request  [facility] Appointment Request    Patient needs appointment for E[facility] Appointment Type: Annual Visit.    Patient Requested Dates(s): any day  Patient Requested Time: any time  Provider Name: Cristhian Colon MD      Reason for Appointment Request: Established Patient - No appointments available during search. Patient wants to move her appointment on the October 15, 2024 because of work abd it is for her Physical Visit appointment.  --------------------------------------------------------------------------------------------------------------------------    Relationship to Patient: Self     Call Back Information: OK to leave message on Sgnam  Preferred Call Back Number: [phone]

## 2024-10-24 ENCOUNTER — OFFICE VISIT (OUTPATIENT)
Dept: INTERNAL MEDICINE CLINIC | Age: 64
End: 2024-10-24
Payer: COMMERCIAL

## 2024-10-24 VITALS
HEIGHT: 63 IN | DIASTOLIC BLOOD PRESSURE: 80 MMHG | RESPIRATION RATE: 18 BRPM | HEART RATE: 57 BPM | TEMPERATURE: 97.4 F | OXYGEN SATURATION: 98 % | BODY MASS INDEX: 33.77 KG/M2 | SYSTOLIC BLOOD PRESSURE: 130 MMHG | WEIGHT: 190.6 LBS

## 2024-10-24 DIAGNOSIS — Z00.00 WELL ADULT EXAM: Primary | ICD-10-CM

## 2024-10-24 DIAGNOSIS — J45.40 MODERATE PERSISTENT ASTHMA WITHOUT COMPLICATION: ICD-10-CM

## 2024-10-24 DIAGNOSIS — E11.9 TYPE 2 DIABETES MELLITUS WITHOUT COMPLICATION, WITHOUT LONG-TERM CURRENT USE OF INSULIN (HCC): ICD-10-CM

## 2024-10-24 DIAGNOSIS — D51.0 PERNICIOUS ANEMIA DUE TO AUTOIMMUNE DISORDER (HCC): ICD-10-CM

## 2024-10-24 DIAGNOSIS — J44.9 CHRONIC OBSTRUCTIVE PULMONARY DISEASE, UNSPECIFIED COPD TYPE (HCC): Chronic | ICD-10-CM

## 2024-10-24 DIAGNOSIS — D89.9 PERNICIOUS ANEMIA DUE TO AUTOIMMUNE DISORDER (HCC): ICD-10-CM

## 2024-10-24 PROCEDURE — 99396 PREV VISIT EST AGE 40-64: CPT | Performed by: INTERNAL MEDICINE

## 2024-10-24 RX ORDER — FLUTICASONE FUROATE, UMECLIDINIUM BROMIDE AND VILANTEROL TRIFENATATE 100; 62.5; 25 UG/1; UG/1; UG/1
1 POWDER RESPIRATORY (INHALATION) DAILY
Qty: 2 EACH | Refills: 0 | Status: SHIPPED | COMMUNITY
Start: 2024-10-24

## 2024-10-24 SDOH — ECONOMIC STABILITY: INCOME INSECURITY: HOW HARD IS IT FOR YOU TO PAY FOR THE VERY BASICS LIKE FOOD, HOUSING, MEDICAL CARE, AND HEATING?: NOT VERY HARD

## 2024-10-24 SDOH — ECONOMIC STABILITY: FOOD INSECURITY: WITHIN THE PAST 12 MONTHS, YOU WORRIED THAT YOUR FOOD WOULD RUN OUT BEFORE YOU GOT MONEY TO BUY MORE.: NEVER TRUE

## 2024-10-24 SDOH — ECONOMIC STABILITY: FOOD INSECURITY: WITHIN THE PAST 12 MONTHS, THE FOOD YOU BOUGHT JUST DIDN'T LAST AND YOU DIDN'T HAVE MONEY TO GET MORE.: NEVER TRUE

## 2024-10-24 ASSESSMENT — ANXIETY QUESTIONNAIRES
6. BECOMING EASILY ANNOYED OR IRRITABLE: NOT AT ALL
GAD7 TOTAL SCORE: 0
7. FEELING AFRAID AS IF SOMETHING AWFUL MIGHT HAPPEN: NOT AT ALL
3. WORRYING TOO MUCH ABOUT DIFFERENT THINGS: NOT AT ALL
2. NOT BEING ABLE TO STOP OR CONTROL WORRYING: NOT AT ALL
1. FEELING NERVOUS, ANXIOUS, OR ON EDGE: NOT AT ALL
5. BEING SO RESTLESS THAT IT IS HARD TO SIT STILL: NOT AT ALL
IF YOU CHECKED OFF ANY PROBLEMS ON THIS QUESTIONNAIRE, HOW DIFFICULT HAVE THESE PROBLEMS MADE IT FOR YOU TO DO YOUR WORK, TAKE CARE OF THINGS AT HOME, OR GET ALONG WITH OTHER PEOPLE: NOT DIFFICULT AT ALL
4. TROUBLE RELAXING: NOT AT ALL

## 2024-10-24 ASSESSMENT — PATIENT HEALTH QUESTIONNAIRE - PHQ9
SUM OF ALL RESPONSES TO PHQ QUESTIONS 1-9: 0
SUM OF ALL RESPONSES TO PHQ QUESTIONS 1-9: 0
1. LITTLE INTEREST OR PLEASURE IN DOING THINGS: NOT AT ALL
2. FEELING DOWN, DEPRESSED OR HOPELESS: NOT AT ALL
SUM OF ALL RESPONSES TO PHQ QUESTIONS 1-9: 0
SUM OF ALL RESPONSES TO PHQ QUESTIONS 1-9: 0
SUM OF ALL RESPONSES TO PHQ9 QUESTIONS 1 & 2: 0

## 2024-10-24 NOTE — ASSESSMENT & PLAN NOTE
Monitored by specialist- no acute findings meriting change in the plan    Orders:    CBC with Auto Differential; Future    Vitamin B12 & Folate; Future

## 2024-10-24 NOTE — PROGRESS NOTES
Temp: 97.4 °F (36.3 °C)   SpO2: 98%   Weight: 86.5 kg (190 lb 9.6 oz)   Height: 1.6 m (5' 3\")      Wt Readings from Last 3 Encounters:   10/24/24 86.5 kg (190 lb 9.6 oz)   09/20/24 86.5 kg (190 lb 9.6 oz)   05/20/24 85.2 kg (187 lb 12.8 oz)     BP Readings from Last 3 Encounters:   10/24/24 130/80   09/20/24 116/60   05/20/24 118/72     Body mass index is 33.76 kg/m². Facility age limit for growth %monty is 20 years.   Objective   Physical Exam  Constitutional:       General: She is not in acute distress.     Appearance: Normal appearance. She is not ill-appearing.   HENT:      Right Ear: Tympanic membrane normal. There is no impacted cerumen.      Left Ear: Tympanic membrane normal.      Nose: Nose normal. No congestion.   Eyes:      General:         Right eye: No discharge.         Left eye: No discharge.      Pupils: Pupils are equal, round, and reactive to light.   Cardiovascular:      Rate and Rhythm: Normal rate and regular rhythm.      Heart sounds: No murmur heard.  Pulmonary:      Effort: Pulmonary effort is normal. No respiratory distress.      Breath sounds: No stridor. No wheezing or rhonchi.   Neurological:      Mental Status: She is alert.             Latest Ref Rng & Units 11/3/2023     6:20 AM 11/3/2023     6:19 AM 11/2/2023     9:26 PM   LAB PRIMARY CARE   GLU random 70 - 99 mg/dL 99       - 145 mmol/L 141      K 3.5 - 5.1 mmol/L 3.9      BUN 7 - 20 mg/dL 10      CR 0.6 - 1.2 mg/dL 1.0      GFR >60 >60      CA 8.3 - 10.6 mg/dL 9.3      HGB 12.0 - 16.0 g/dL  8.4  8.3        Lab Results   Component Value Date/Time    CHOL 143 09/29/2023 10:45 AM    CHOL 178 09/28/2022 08:33 AM    CHOL 209 09/10/2021 09:09 AM    CHOLFAST 203 07/12/2019 07:42 AM    TRIG 111 09/29/2023 10:45 AM    TRIG 68 09/28/2022 08:33 AM    TRIG 69 09/10/2021 09:09 AM    TRIGLYCFAST 79 07/12/2019 07:42 AM    HDL 51 09/29/2023 10:45 AM    HDL 62 09/28/2022 08:33 AM    HDL 65 09/10/2021 09:09 AM    GLUCOSE 99 11/03/2023 06:20 AM

## 2024-10-24 NOTE — ASSESSMENT & PLAN NOTE
Stable, continue trelegy    Orders:    fluticasone-umeclidin-vilant (TRELEGY ELLIPTA) 100-62.5-25 MCG/ACT AEPB inhaler; Inhale 1 puff into the lungs daily

## 2024-10-24 NOTE — ASSESSMENT & PLAN NOTE
Chronic, worsening (exacerbation), stable    Orders:    Comprehensive Metabolic Panel; Future    Lipid Panel; Future    Hemoglobin A1C; Future    Microalbumin / Creatinine Urine Ratio; Future

## 2024-12-22 DIAGNOSIS — E05.90 HYPERTHYROIDISM: ICD-10-CM

## 2024-12-23 RX ORDER — METHIMAZOLE 5 MG/1
TABLET ORAL
Qty: 45 TABLET | Refills: 2 | Status: SHIPPED | OUTPATIENT
Start: 2024-12-23

## 2024-12-23 NOTE — TELEPHONE ENCOUNTER
Medication:   Requested Prescriptions     Pending Prescriptions Disp Refills    methIMAzole (TAPAZOLE) 5 MG tablet [Pharmacy Med Name: METHIMAZOLE 5MG TABLETS] 45 tablet 2     Sig: TAKE 1 TABLET BY MOUTH EVERY OTHER DAY       Last Filled:      Patient Phone Number: [phone] (home)     Last appt: 5/3/2024   Next appt: Visit date not found    Last Thyroid:   Lab Results   Component Value Date/Time    TSH 0.89 11/02/2023 09:31 AM    FT3 2.58 05/09/2024 11:27 AM    K6ECVMH 1.18 09/21/2022 02:48 PM    T4FREE 1.0 05/09/2024 11:27 AM

## 2024-12-30 DIAGNOSIS — D89.9 PERNICIOUS ANEMIA DUE TO AUTOIMMUNE DISORDER (HCC): ICD-10-CM

## 2024-12-30 DIAGNOSIS — D51.0 PERNICIOUS ANEMIA DUE TO AUTOIMMUNE DISORDER (HCC): ICD-10-CM

## 2024-12-30 DIAGNOSIS — E11.9 TYPE 2 DIABETES MELLITUS WITHOUT COMPLICATION, WITHOUT LONG-TERM CURRENT USE OF INSULIN (HCC): ICD-10-CM

## 2024-12-30 LAB
ALBUMIN SERPL-MCNC: 4.3 G/DL (ref 3.4–5)
ALBUMIN/GLOB SERPL: 1.4 {RATIO} (ref 1.1–2.2)
ALP SERPL-CCNC: 80 U/L (ref 40–129)
ALT SERPL-CCNC: 15 U/L (ref 10–40)
ANION GAP SERPL CALCULATED.3IONS-SCNC: 12 MMOL/L (ref 3–16)
AST SERPL-CCNC: 17 U/L (ref 15–37)
BASOPHILS # BLD: 0 K/UL (ref 0–0.2)
BASOPHILS NFR BLD: 0.7 %
BILIRUB SERPL-MCNC: 0.7 MG/DL (ref 0–1)
BUN SERPL-MCNC: 9 MG/DL (ref 7–20)
CALCIUM SERPL-MCNC: 8.9 MG/DL (ref 8.3–10.6)
CHLORIDE SERPL-SCNC: 97 MMOL/L (ref 99–110)
CHOLEST SERPL-MCNC: 197 MG/DL (ref 0–199)
CO2 SERPL-SCNC: 26 MMOL/L (ref 21–32)
CREAT SERPL-MCNC: 1 MG/DL (ref 0.6–1.2)
CREAT UR-MCNC: 196 MG/DL (ref 28–259)
DEPRECATED RDW RBC AUTO: 15.1 % (ref 12.4–15.4)
EOSINOPHIL # BLD: 0.1 K/UL (ref 0–0.6)
EOSINOPHIL NFR BLD: 2.4 %
FOLATE SERPL-MCNC: 3.95 NG/ML (ref 4.78–24.2)
GFR SERPLBLD CREATININE-BSD FMLA CKD-EPI: 64 ML/MIN/{1.73_M2}
GLUCOSE SERPL-MCNC: 79 MG/DL (ref 70–99)
HCT VFR BLD AUTO: 44.1 % (ref 36–48)
HDLC SERPL-MCNC: 90 MG/DL (ref 40–60)
HGB BLD-MCNC: 14.8 G/DL (ref 12–16)
LDLC SERPL CALC-MCNC: 86 MG/DL
LYMPHOCYTES # BLD: 1.8 K/UL (ref 1–5.1)
LYMPHOCYTES NFR BLD: 31.3 %
MCH RBC QN AUTO: 33.5 PG (ref 26–34)
MCHC RBC AUTO-ENTMCNC: 33.6 G/DL (ref 31–36)
MCV RBC AUTO: 100 FL (ref 80–100)
MICROALBUMIN UR DL<=1MG/L-MCNC: 1.37 MG/DL
MICROALBUMIN/CREAT UR: 7 MG/G (ref 0–30)
MONOCYTES # BLD: 0.5 K/UL (ref 0–1.3)
MONOCYTES NFR BLD: 8.2 %
NEUTROPHILS # BLD: 3.4 K/UL (ref 1.7–7.7)
NEUTROPHILS NFR BLD: 57.4 %
PLATELET # BLD AUTO: 230 K/UL (ref 135–450)
PMV BLD AUTO: 9.9 FL (ref 5–10.5)
POTASSIUM SERPL-SCNC: 3.9 MMOL/L (ref 3.5–5.1)
PROT SERPL-MCNC: 7.3 G/DL (ref 6.4–8.2)
RBC # BLD AUTO: 4.41 M/UL (ref 4–5.2)
SODIUM SERPL-SCNC: 135 MMOL/L (ref 136–145)
TRIGL SERPL-MCNC: 107 MG/DL (ref 0–150)
VIT B12 SERPL-MCNC: 367 PG/ML (ref 211–911)
VLDLC SERPL CALC-MCNC: 21 MG/DL
WBC # BLD AUTO: 5.9 K/UL (ref 4–11)

## 2024-12-31 LAB
EST. AVERAGE GLUCOSE BLD GHB EST-MCNC: 105.4 MG/DL
HBA1C MFR BLD: 5.3 %

## 2025-01-17 ENCOUNTER — TELEMEDICINE (OUTPATIENT)
Dept: ENDOCRINOLOGY | Age: 65
End: 2025-01-17
Payer: COMMERCIAL

## 2025-01-17 DIAGNOSIS — E05.90 HYPERTHYROIDISM: ICD-10-CM

## 2025-01-17 PROCEDURE — 99213 OFFICE O/P EST LOW 20 MIN: CPT | Performed by: INTERNAL MEDICINE

## 2025-01-17 RX ORDER — METHIMAZOLE 5 MG/1
TABLET ORAL
Qty: 45 TABLET | Refills: 2 | Status: SHIPPED | OUTPATIENT
Start: 2025-01-17

## 2025-01-17 RX ORDER — METHIMAZOLE 5 MG/1
TABLET ORAL
Qty: 45 TABLET | Refills: 2 | Status: SHIPPED | OUTPATIENT
Start: 2025-01-17 | End: 2025-01-17 | Stop reason: SDUPTHER

## 2025-01-17 NOTE — PROGRESS NOTES
Mouth/Throat: Mucous membranes are moist.     External Ears [x] Normal  no discharge    Neck: [x] No visualized mass  no swelling    Pulmonary/Chest: [x] Respiratory effort normal.  [x] No visualized signs of difficulty breathing or respiratory distress             Musculoskeletal:   [x] Normal gait with no signs of ataxia         [x] Normal range of motion of neck          Head and neck stable, appears normal ROM, strength good    Neurological:        [x] No Facial Asymmetry (Cranial nerve 7 motor function) (limited exam to video visit)          [x] No gaze palsy                Skin:        [x] No significant exanthematous lesions or discoloration noted on facial skin                 Psychiatric:       [x] Normal Affect [x] No Hallucinations            Other pertinent observable physical exam findings-     Due to this being a TeleHealth encounter, evaluation of the following organ systems is limited: Vitals/Constitutional/EENT/Resp/CV/GI//MS/Neuro/Skin/Heme-Lymph-Imm.      Services were provided through a video synchronous discussion virtually to substitute for in-person clinic visit.         Past Medical History:   Diagnosis Date    Asthma     Chronic back pain     COPD (chronic obstructive pulmonary disease) (HCC)     confirmed with PFT     HTN, goal below 140/90     Hyperthyroidism     Grave's dz    Obesity (BMI 30-39.9) 08/31/2019    Red eyes     Restrictive lung disease     secondary to weight    Surgical menopause     LMP at age 45     Past Surgical History:   Procedure Laterality Date    COLONOSCOPY N/A 11/3/2023    COLONOSCOPY DIAGNOSTIC performed by Alejandro Terrazas MD at [facility] ENDOSCOPY    CT BIOPSY BONE MARROW  11/3/2023    CT BONE MARROW BIOPSY 11/3/2023 [facility] CT SCAN    HERNIA REPAIR      20 years ago     HYSTERECTOMY (CERVIX STATUS UNKNOWN)      10 years ago; fibroid    UPPER GASTROINTESTINAL ENDOSCOPY N/A 11/3/2023    EGD BIOPSY performed by Alejandro Terrazas MD at [facility] ENDOSCOPY    US BREAST BIOPSY W

## 2025-01-24 ENCOUNTER — OFFICE VISIT (OUTPATIENT)
Dept: INTERNAL MEDICINE CLINIC | Age: 65
End: 2025-01-24

## 2025-01-24 VITALS
BODY MASS INDEX: 32.11 KG/M2 | DIASTOLIC BLOOD PRESSURE: 72 MMHG | SYSTOLIC BLOOD PRESSURE: 118 MMHG | HEIGHT: 63 IN | HEART RATE: 68 BPM | RESPIRATION RATE: 18 BRPM | TEMPERATURE: 97.2 F | WEIGHT: 181.2 LBS | OXYGEN SATURATION: 98 %

## 2025-01-24 DIAGNOSIS — J44.9 CHRONIC OBSTRUCTIVE PULMONARY DISEASE, UNSPECIFIED COPD TYPE (HCC): Primary | Chronic | ICD-10-CM

## 2025-01-24 DIAGNOSIS — E11.9 TYPE 2 DIABETES MELLITUS WITHOUT COMPLICATION, WITHOUT LONG-TERM CURRENT USE OF INSULIN (HCC): ICD-10-CM

## 2025-01-24 RX ORDER — FLUTICASONE FUROATE, UMECLIDINIUM BROMIDE AND VILANTEROL TRIFENATATE 100; 62.5; 25 UG/1; UG/1; UG/1
1 POWDER RESPIRATORY (INHALATION) DAILY
Qty: 2 EACH | Refills: 0 | Status: SHIPPED | COMMUNITY
Start: 2025-01-24

## 2025-01-24 SDOH — ECONOMIC STABILITY: FOOD INSECURITY: WITHIN THE PAST 12 MONTHS, YOU WORRIED THAT YOUR FOOD WOULD RUN OUT BEFORE YOU GOT MONEY TO BUY MORE.: PATIENT DECLINED

## 2025-01-24 SDOH — ECONOMIC STABILITY: FOOD INSECURITY: WITHIN THE PAST 12 MONTHS, THE FOOD YOU BOUGHT JUST DIDN'T LAST AND YOU DIDN'T HAVE MONEY TO GET MORE.: PATIENT DECLINED

## 2025-01-24 ASSESSMENT — ANXIETY QUESTIONNAIRES
6. BECOMING EASILY ANNOYED OR IRRITABLE: NOT AT ALL
GAD7 TOTAL SCORE: 0
7. FEELING AFRAID AS IF SOMETHING AWFUL MIGHT HAPPEN: NOT AT ALL
4. TROUBLE RELAXING: NOT AT ALL
1. FEELING NERVOUS, ANXIOUS, OR ON EDGE: NOT AT ALL
2. NOT BEING ABLE TO STOP OR CONTROL WORRYING: NOT AT ALL
3. WORRYING TOO MUCH ABOUT DIFFERENT THINGS: NOT AT ALL
5. BEING SO RESTLESS THAT IT IS HARD TO SIT STILL: NOT AT ALL
IF YOU CHECKED OFF ANY PROBLEMS ON THIS QUESTIONNAIRE, HOW DIFFICULT HAVE THESE PROBLEMS MADE IT FOR YOU TO DO YOUR WORK, TAKE CARE OF THINGS AT HOME, OR GET ALONG WITH OTHER PEOPLE: NOT DIFFICULT AT ALL

## 2025-01-24 ASSESSMENT — PATIENT HEALTH QUESTIONNAIRE - PHQ9
SUM OF ALL RESPONSES TO PHQ9 QUESTIONS 1 & 2: 0
SUM OF ALL RESPONSES TO PHQ QUESTIONS 1-9: 0
SUM OF ALL RESPONSES TO PHQ QUESTIONS 1-9: 0
2. FEELING DOWN, DEPRESSED OR HOPELESS: NOT AT ALL
1. LITTLE INTEREST OR PLEASURE IN DOING THINGS: NOT AT ALL
SUM OF ALL RESPONSES TO PHQ QUESTIONS 1-9: 0
SUM OF ALL RESPONSES TO PHQ QUESTIONS 1-9: 0

## 2025-01-24 NOTE — PROGRESS NOTES
Candice Narayan (:  1960) is a 64 y.o. female,Established patient, here for evaluation of the following chief complaint(s):  Follow-up and Asthma         Assessment & Plan  Chronic obstructive pulmonary disease, unspecified COPD type (HCC)   Chronic, at goal (stable), continue current treatment plan    Orders:    fluticasone-umeclidin-vilant (TRELEGY ELLIPTA) 100-62.5-25 MCG/ACT AEPB inhaler; Inhale 1 puff into the lungs daily    Type 2 diabetes mellitus without complication, without long-term current use of insulin (HCC)   Chronic, at goal (stable), continue current treatment plan           No follow-ups on file.       Subjective   HPI    COPD:  Current treatment includes combined beta agonist/steroid inhaler, anticholinergic inhaler.  Residual symptoms: none.  She denies any other symptoms. She requires her rescue inhaler 0 time(s) per month.     Review of Systems       Objective   Vitals:    25 1502   BP: 118/72   Pulse: 68   Resp: 18   Temp: 97.2 °F (36.2 °C)   SpO2: 98%   Weight: 82.2 kg (181 lb 3.2 oz)   Height: 1.6 m (5' 3\")      Wt Readings from Last 3 Encounters:   25 82.2 kg (181 lb 3.2 oz)   10/24/24 86.5 kg (190 lb 9.6 oz)   24 86.5 kg (190 lb 9.6 oz)     BP Readings from Last 3 Encounters:   25 118/72   10/24/24 130/80   24 116/60     Body mass index is 32.1 kg/m². Facility age limit for growth %monty is 20 years.   Physical Exam  Constitutional:       Appearance: Normal appearance.   HENT:      Mouth/Throat:      Mouth: Mucous membranes are moist.      Pharynx: No oropharyngeal exudate or posterior oropharyngeal erythema.   Eyes:      Pupils: Pupils are equal, round, and reactive to light.   Cardiovascular:      Rate and Rhythm: Normal rate and regular rhythm.   Pulmonary:      Effort: Pulmonary effort is normal. No respiratory distress.      Breath sounds: No stridor. No wheezing or rhonchi.   Musculoskeletal:      Cervical back: Normal range of motion and

## 2025-01-24 NOTE — ASSESSMENT & PLAN NOTE
Chronic, at goal (stable), continue current treatment plan    Orders:    fluticasone-umeclidin-vilant (TRELEGY ELLIPTA) 100-62.5-25 MCG/ACT AEPB inhaler; Inhale 1 puff into the lungs daily

## 2025-01-28 DIAGNOSIS — E11.9 TYPE 2 DIABETES MELLITUS WITHOUT COMPLICATION, WITHOUT LONG-TERM CURRENT USE OF INSULIN (HCC): ICD-10-CM

## 2025-01-28 DIAGNOSIS — E05.90 HYPERTHYROIDISM: ICD-10-CM

## 2025-01-28 LAB
ALBUMIN SERPL-MCNC: 4.4 G/DL (ref 3.4–5)
ALBUMIN/GLOB SERPL: 1.5 {RATIO} (ref 1.1–2.2)
ALP SERPL-CCNC: 81 U/L (ref 40–129)
ALT SERPL-CCNC: 19 U/L (ref 10–40)
ANION GAP SERPL CALCULATED.3IONS-SCNC: 10 MMOL/L (ref 3–16)
AST SERPL-CCNC: 21 U/L (ref 15–37)
BILIRUB SERPL-MCNC: 0.8 MG/DL (ref 0–1)
BUN SERPL-MCNC: 12 MG/DL (ref 7–20)
CALCIUM SERPL-MCNC: 9.7 MG/DL (ref 8.3–10.6)
CHLORIDE SERPL-SCNC: 100 MMOL/L (ref 99–110)
CHOLEST SERPL-MCNC: 199 MG/DL (ref 0–199)
CO2 SERPL-SCNC: 30 MMOL/L (ref 21–32)
CREAT SERPL-MCNC: 1 MG/DL (ref 0.6–1.2)
GFR SERPLBLD CREATININE-BSD FMLA CKD-EPI: 64 ML/MIN/{1.73_M2}
GLUCOSE SERPL-MCNC: 60 MG/DL (ref 70–99)
HDLC SERPL-MCNC: 81 MG/DL (ref 40–60)
LDLC SERPL CALC-MCNC: 94 MG/DL
POTASSIUM SERPL-SCNC: 3.9 MMOL/L (ref 3.5–5.1)
PROT SERPL-MCNC: 7.3 G/DL (ref 6.4–8.2)
SODIUM SERPL-SCNC: 140 MMOL/L (ref 136–145)
TRIGL SERPL-MCNC: 119 MG/DL (ref 0–150)
TSH SERPL DL<=0.005 MIU/L-ACNC: 1.14 UIU/ML (ref 0.27–4.2)
VLDLC SERPL CALC-MCNC: 24 MG/DL

## 2025-01-29 LAB
EST. AVERAGE GLUCOSE BLD GHB EST-MCNC: 96.8 MG/DL
HBA1C MFR BLD: 5 %

## 2025-03-29 RX ORDER — CETIRIZINE HYDROCHLORIDE 10 MG/1
10 TABLET ORAL DAILY
Qty: 30 TABLET | Refills: 5 | Status: SHIPPED | OUTPATIENT
Start: 2025-03-29 | End: 2025-09-25

## 2025-04-14 RX ORDER — ACETAMINOPHEN 160 MG
2000 TABLET,DISINTEGRATING ORAL DAILY
Qty: 30 CAPSULE | Refills: 11 | Status: SHIPPED | OUTPATIENT
Start: 2025-04-14

## 2025-05-02 RX ORDER — FLUTICASONE PROPIONATE 50 MCG
2 SPRAY, SUSPENSION (ML) NASAL DAILY
Qty: 16 G | Refills: 1 | Status: SHIPPED | OUTPATIENT
Start: 2025-05-02

## 2025-05-29 ENCOUNTER — OFFICE VISIT (OUTPATIENT)
Dept: INTERNAL MEDICINE CLINIC | Age: 65
End: 2025-05-29
Payer: COMMERCIAL

## 2025-05-29 VITALS
HEIGHT: 63 IN | TEMPERATURE: 97.2 F | SYSTOLIC BLOOD PRESSURE: 106 MMHG | DIASTOLIC BLOOD PRESSURE: 72 MMHG | WEIGHT: 181.8 LBS | BODY MASS INDEX: 32.21 KG/M2 | RESPIRATION RATE: 18 BRPM | OXYGEN SATURATION: 98 % | HEART RATE: 54 BPM

## 2025-05-29 DIAGNOSIS — E05.90 HYPERTHYROIDISM: ICD-10-CM

## 2025-05-29 DIAGNOSIS — E11.9 TYPE 2 DIABETES MELLITUS WITHOUT COMPLICATION, WITHOUT LONG-TERM CURRENT USE OF INSULIN (HCC): Primary | ICD-10-CM

## 2025-05-29 DIAGNOSIS — E11.9 TYPE 2 DIABETES MELLITUS WITHOUT COMPLICATION, WITHOUT LONG-TERM CURRENT USE OF INSULIN (HCC): ICD-10-CM

## 2025-05-29 DIAGNOSIS — I10 ESSENTIAL HYPERTENSION: ICD-10-CM

## 2025-05-29 PROCEDURE — G2211 COMPLEX E/M VISIT ADD ON: HCPCS | Performed by: INTERNAL MEDICINE

## 2025-05-29 PROCEDURE — 3074F SYST BP LT 130 MM HG: CPT | Performed by: INTERNAL MEDICINE

## 2025-05-29 PROCEDURE — 3078F DIAST BP <80 MM HG: CPT | Performed by: INTERNAL MEDICINE

## 2025-05-29 PROCEDURE — 3044F HG A1C LEVEL LT 7.0%: CPT | Performed by: INTERNAL MEDICINE

## 2025-05-29 PROCEDURE — 99214 OFFICE O/P EST MOD 30 MIN: CPT | Performed by: INTERNAL MEDICINE

## 2025-05-29 RX ORDER — FOLIC ACID 1 MG/1
1000 TABLET ORAL DAILY
COMMUNITY

## 2025-05-29 RX ORDER — HYDROCHLOROTHIAZIDE 12.5 MG/1
12.5 TABLET ORAL DAILY
Qty: 90 TABLET | Refills: 3 | Status: SHIPPED | OUTPATIENT
Start: 2025-05-29

## 2025-05-29 SDOH — ECONOMIC STABILITY: FOOD INSECURITY: WITHIN THE PAST 12 MONTHS, THE FOOD YOU BOUGHT JUST DIDN'T LAST AND YOU DIDN'T HAVE MONEY TO GET MORE.: PATIENT DECLINED

## 2025-05-29 SDOH — ECONOMIC STABILITY: FOOD INSECURITY: WITHIN THE PAST 12 MONTHS, YOU WORRIED THAT YOUR FOOD WOULD RUN OUT BEFORE YOU GOT MONEY TO BUY MORE.: PATIENT DECLINED

## 2025-05-29 ASSESSMENT — PATIENT HEALTH QUESTIONNAIRE - PHQ9
SUM OF ALL RESPONSES TO PHQ QUESTIONS 1-9: 0
1. LITTLE INTEREST OR PLEASURE IN DOING THINGS: NOT AT ALL
SUM OF ALL RESPONSES TO PHQ QUESTIONS 1-9: 0
2. FEELING DOWN, DEPRESSED OR HOPELESS: NOT AT ALL

## 2025-05-29 NOTE — PROGRESS NOTES
Candice Narayan (:  1960) is a 64 y.o. female,Established patient, here for evaluation of the following chief complaint(s):  Follow-up and Diabetes         Assessment & Plan  Essential hypertension   Chronic, at goal (stable), continue current treatment plan    Orders:    hydroCHLOROthiazide 12.5 MG tablet; Take 1 tablet by mouth daily One tab daily    Type 2 diabetes mellitus without complication, without long-term current use of insulin ([facility])    Continue     Orders:    Comprehensive Metabolic Panel; Future    Hemoglobin A1C; Future    Hyperthyroidism    stable    Orders:    TSH reflex to FT4,FT3 ([place] Only); Future      Return in about 6 months (around 2025) for Annual Physical.       Subjective   HPI  Diabetes Mellitus Type 2: Current symptoms/problems include none.    Medication compliance:  compliant most of the time  Diabetic diet compliance:  compliant most of the time,  Weight trend: stable  Current exercise: no regular exercise  Barriers: none    Home blood sugar records: patient does not test  Any episodes of hypoglycemia? no  Eye exam current (within one year): no   reports that she has never smoked. She has never used smokeless tobacco.   Daily Aspirin? Yes    Patient stopped taking the rybelus    Lab Results   Component Value Date    LABA1C 5.0 2025    LABA1C 5.3 2024    LABA1C 5.9 2023     Lab Results   Component Value Date    CREATININE 1.0 2025     Lab Results   Component Value Date    ALT 19 2025    AST 21 2025     Lab Results   Component Value Date    CHOL 199 2025    TRIG 119 2025    HDL 81 (H) 2025           Review of Systems       Objective   Vitals:    25 1344   BP: 106/72   BP Site: Left Upper Arm   Patient Position: Sitting   BP Cuff Size: Large Adult   Pulse: 54   Resp: 18   Temp: 97.2 °F (36.2 °C)   SpO2: 98%   Weight: 82.5 kg (181 lb 12.8 oz)   Height: 1.6 m (5' 3\")      Wt Readings from Last 3

## 2025-05-30 LAB
ALBUMIN SERPL-MCNC: 4.3 G/DL (ref 3.4–5)
ALBUMIN/GLOB SERPL: 1.5 {RATIO} (ref 1.1–2.2)
ALP SERPL-CCNC: 85 U/L (ref 40–129)
ALT SERPL-CCNC: 30 U/L (ref 10–40)
ANION GAP SERPL CALCULATED.3IONS-SCNC: 11 MMOL/L (ref 3–16)
AST SERPL-CCNC: 58 U/L (ref 15–37)
BILIRUB SERPL-MCNC: 0.9 MG/DL (ref 0–1)
BUN SERPL-MCNC: 14 MG/DL (ref 7–20)
CALCIUM SERPL-MCNC: 9.5 MG/DL (ref 8.3–10.6)
CHLORIDE SERPL-SCNC: 102 MMOL/L (ref 99–110)
CO2 SERPL-SCNC: 27 MMOL/L (ref 21–32)
CREAT SERPL-MCNC: 1 MG/DL (ref 0.6–1.2)
EST. AVERAGE GLUCOSE BLD GHB EST-MCNC: 102.5 MG/DL
GFR SERPLBLD CREATININE-BSD FMLA CKD-EPI: 63 ML/MIN/{1.73_M2}
GLUCOSE SERPL-MCNC: 90 MG/DL (ref 70–99)
HBA1C MFR BLD: 5.2 %
POTASSIUM SERPL-SCNC: 4.4 MMOL/L (ref 3.5–5.1)
PROT SERPL-MCNC: 7.2 G/DL (ref 6.4–8.2)
SODIUM SERPL-SCNC: 140 MMOL/L (ref 136–145)
TSH SERPL DL<=0.005 MIU/L-ACNC: 1.17 UIU/ML (ref 0.27–4.2)

## 2025-05-31 ENCOUNTER — RESULTS FOLLOW-UP (OUTPATIENT)
Dept: INTERNAL MEDICINE CLINIC | Age: 65
End: 2025-05-31

## 2025-08-22 ENCOUNTER — HOSPITAL ENCOUNTER (OUTPATIENT)
Dept: MAMMOGRAPHY | Age: 65
Discharge: HOME | End: 2025-08-26
Payer: COMMERCIAL

## 2025-08-22 VITALS — WEIGHT: 188 LBS | BODY MASS INDEX: 35.5 KG/M2 | HEIGHT: 61 IN

## 2025-08-22 DIAGNOSIS — Z12.31 VISIT FOR SCREENING MAMMOGRAM: ICD-10-CM

## 2025-08-22 PROCEDURE — 77067 SCR MAMMO BI INCL CAD: CPT

## (undated) DEVICE — AIR/WATER CLEANING ADAPTER FOR OLYMPUS® GI ENDOSCOPE: Brand: BULLDOG®

## (undated) DEVICE — VALVE SUCTION AIR H2O SET ORCA POD + DISP

## (undated) DEVICE — MOUTHPIECE ENDOSCP L CTRL OPN AND SIDE PORTS DISP

## (undated) DEVICE — SOLUTION IV IRRIG WATER 500ML POUR BRL ST 2F7113

## (undated) DEVICE — FORCEPS BX L240CM WRK CHN 2.8MM STD CAP W/ NDL MIC MESH

## (undated) DEVICE — ENDOSCOPIC KIT 6X3/16 FT COLON W/ 1.1 OZ 2 GWN W/O BRSH

## (undated) DEVICE — BW-412T DISP COMBO CLEANING BRUSH: Brand: SINGLE USE COMBINATION CLEANING BRUSH